# Patient Record
Sex: MALE | Race: BLACK OR AFRICAN AMERICAN | NOT HISPANIC OR LATINO | Employment: OTHER | URBAN - METROPOLITAN AREA
[De-identification: names, ages, dates, MRNs, and addresses within clinical notes are randomized per-mention and may not be internally consistent; named-entity substitution may affect disease eponyms.]

---

## 2020-01-23 ENCOUNTER — HISTORICAL (OUTPATIENT)
Dept: LAB | Facility: HOSPITAL | Age: 55
End: 2020-01-23

## 2020-01-23 LAB — GRAM STN SPEC: NORMAL

## 2020-04-26 ENCOUNTER — HISTORICAL (OUTPATIENT)
Dept: URGENT CARE | Facility: CLINIC | Age: 55
End: 2020-04-26

## 2022-04-10 ENCOUNTER — HISTORICAL (OUTPATIENT)
Dept: ADMINISTRATIVE | Facility: HOSPITAL | Age: 57
End: 2022-04-10

## 2022-04-27 VITALS
WEIGHT: 229.94 LBS | HEIGHT: 71 IN | SYSTOLIC BLOOD PRESSURE: 136 MMHG | DIASTOLIC BLOOD PRESSURE: 90 MMHG | OXYGEN SATURATION: 97 % | BODY MASS INDEX: 32.19 KG/M2

## 2022-12-30 DIAGNOSIS — M51.36 DDD (DEGENERATIVE DISC DISEASE), LUMBAR: Primary | ICD-10-CM

## 2023-01-06 ENCOUNTER — TELEPHONE (OUTPATIENT)
Dept: NEUROSURGERY | Facility: CLINIC | Age: 58
End: 2023-01-06
Payer: MEDICARE

## 2023-01-06 NOTE — TELEPHONE ENCOUNTER
Received referral. After reviewing office notes, I did realize that Dr. Strange also referred patient to [facility]. I called patient to see if he had ever had an apt with Dr. Tejeda. He stated no but he is wanting to be worked up by us. I advised that I will call Dr. Strange's office to see if they have a more accurate office note regarding his symptoms and will give them a call back

## 2023-01-11 NOTE — TELEPHONE ENCOUNTER
Spoke with patient. I inquired about any past imaging that was done. He stated that he had an MRI at [facility] in the past 6mo. I checked on [facility], and the only thing showing is a CXR. He is complaining of lower back pain that radiates down his leg with a burning/throbbing sensation that has been going on for about 8-9mo. States he cannot sit for long periods of time because of this pain. He has had L3 and L4 back surgery with Dr. Coronel along with 2 injections and pain mngment with Dr. Funez. He has done PT [facility] at [facility] [place] and now does home therapy. He states that he thinks it is a nerve issue because when he takes his nerve medication, Nabumetone, it relieves his pain but puts him to sleep. He rates his pain 7/10 and 8/10 on his bad days. He also takes Narco to take the edge off along with Nabumetone. I asked patient if he can call the doctors above and have them fax us clinicals along with any imaging that may help his case. He will also call PT in [place] and have them fax over PT notes. I did give patient our fax number and will be looking out for records. Once received, I will review and send accordingly. Patient verbalized understanding.

## 2023-01-11 NOTE — TELEPHONE ENCOUNTER
Patient called me back. States when he tried to call to requests records, they told him that we have to send over a records release form. Patient hung up and I was not able to let him know what was going on. Per les, I will fax Dr. Strange the fax cover requesting additional records

## 2023-01-12 NOTE — TELEPHONE ENCOUNTER
Tried to call Dr. Chilel's office x2 to see if they have received my fax, no answer because the office is currently closed

## 2023-01-13 NOTE — TELEPHONE ENCOUNTER
Tried to call the office again, still says there are closed. Will call patient and see if he maybe has another number he uses.   Tried to call the patient, no answer so I left message on machine. Faxed sheet again requesting office note and recent imaging.

## 2023-01-13 NOTE — TELEPHONE ENCOUNTER
Patient returned my call. States that Dr. Chilel is out until Tuesday and their office will remained closed until he gets back. Advised that I will send myself a reminder to call them Tuesday, just wanted to update him on status. He verbally understood.

## 2023-01-17 NOTE — TELEPHONE ENCOUNTER
Tried to call again, office is currently closed. Tried to call patient to see if he can come into our office and sign release form so I can get PT notes from [facility], injection notes from Dr. Solares, pain mngment notes from Dr. Funez and back sx notes from Dr. Coronel. Plan is to schedule him with Mary and she will order testing. No answer so I left message on machine

## 2023-01-17 NOTE — TELEPHONE ENCOUNTER
Tried to call their office again to ensure they received my fax, no answer because their office is closed

## 2023-01-18 NOTE — TELEPHONE ENCOUNTER
Patient returned my call. Advised of below. He states Dr. Gaytan's office has been closed for a couple of days so I asked him if he had a email or fax machine so I can get this release form to him to speed up the process. He states he will call me back with a fax number

## 2023-01-19 NOTE — TELEPHONE ENCOUNTER
Patient called me back and gave me an e-mail address to e-mail the release of information form. Emailed him and awaiting.

## 2023-01-20 DIAGNOSIS — M51.36 DDD (DEGENERATIVE DISC DISEASE), LUMBAR: Primary | ICD-10-CM

## 2023-01-20 NOTE — TELEPHONE ENCOUNTER
Received records request. Faxed to [facility] for sx notes and injection notes and [facility] for PT notes. I called patient and advised that I did receive release form and I faxed it to above physicians. I did advise him that we can schedule an apt with Mary for Thursday but if I do not have all these records, we will need to r/s. I told him I can send a remind me for Wednesday to make sure we have everything so he is good to go for apt. He also requests at that time, to remind him of his apt and Xray. Scheduled apt with Mary on 1/26/23 at 1:00, Xray at [facility] for 12:00. Patient is aware of this and verbalized understanding

## 2023-01-25 ENCOUNTER — TELEPHONE (OUTPATIENT)
Dept: NEUROSURGERY | Facility: CLINIC | Age: 58
End: 2023-01-25
Payer: MEDICARE

## 2023-01-25 NOTE — TELEPHONE ENCOUNTER
Received all records that were requested. Tried to call patient to let him know of this and to remind him of his apt, no answer so I LMOM.

## 2023-01-25 NOTE — TELEPHONE ENCOUNTER
----- Message from Galilea Gonzáles MA sent at 1/20/2023  8:42 AM CST -----  Regarding: see if we have received requested records for patients apt on 1/26  From  Dr. Funez- Pain mngment notes, [facility] for sx notes and injection notes and [facility] for PT notes. And call patient to remind him of his apt

## 2023-01-26 ENCOUNTER — HOSPITAL ENCOUNTER (OUTPATIENT)
Dept: RADIOLOGY | Facility: HOSPITAL | Age: 58
Discharge: HOME | End: 2023-01-26
Attending: NURSE PRACTITIONER
Payer: MEDICARE

## 2023-01-26 DIAGNOSIS — M51.36 DDD (DEGENERATIVE DISC DISEASE), LUMBAR: ICD-10-CM

## 2023-01-26 PROBLEM — E78.5 HYPERLIPIDEMIA: Status: ACTIVE | Noted: 2023-01-26

## 2023-01-26 PROBLEM — E66.9 OBESITY: Status: ACTIVE | Noted: 2023-01-26

## 2023-01-26 PROBLEM — Z91.09 ENVIRONMENTAL ALLERGIES: Status: ACTIVE | Noted: 2023-01-26

## 2023-01-26 PROBLEM — K21.9 GASTROESOPHAGEAL REFLUX DISEASE WITHOUT ESOPHAGITIS: Status: ACTIVE | Noted: 2023-01-26

## 2023-01-26 PROBLEM — I10 HYPERTENSION: Status: ACTIVE | Noted: 2023-01-26

## 2023-01-26 PROCEDURE — 72114 X-RAY EXAM L-S SPINE BENDING: CPT | Mod: TC

## 2023-01-30 ENCOUNTER — TELEPHONE (OUTPATIENT)
Dept: NEUROSURGERY | Facility: CLINIC | Age: 58
End: 2023-01-30
Payer: MEDICARE

## 2023-01-30 NOTE — TELEPHONE ENCOUNTER
The patient called concerned about insurance authorizing another MRI ordered by Mary because he was under the impression that he had a recent MRI ordered by Dr. Hickman. I called Dr. Hickman's office for any recent testing and was informed that he did all of his imaging @ [facility]. We were able to push his imaging and he had a CT Lumbar done in 06/2022 and his last MRI of his Lumbar was done in 2020. I explained this to the patient and he was ok with proceeding w/ the MRI. He also had some concerns about pain mgmt. Mary referred him to get injections and he currently also sees Dr. Funez who doesn't do injections only medication pain mgmt. He expressed that he loves Dr. Funez' office and doesn't want to stop seeing him if he doesn't have to. I assured him that if he only wants to do injections w/ another doctor I can specify that on his referral so that he can continue seeing Dr. Funez for medication pain mgmt. He was compliant.

## 2023-02-14 NOTE — PROGRESS NOTES
Subjective:      Patient ID: Meir Murry is a 57 y.o. male.    Chief Complaint: Back Pain (Referred by Dr Strange, pt states he has a great deal of pain has had surgery in the past, has had injections in the past which has helped, was attending P.T[facility] would like to schedule an injection, would like injection for pain relief today,  pain level 8/10)    Referred by: Mary Cottrell, NP     HPI: Patient presents as a new consult for lumbar degenerative disc disease and lumbar post-laminectomy syndrome.  Patient has prior past surgical history of right L2-3, L3-4 decompression and screws on 03/18/2020 with Dr. Hickman.  Since then his pain is located to the lower back right buttock radiating down right leg to his ankle.  Pain described as burning and throbbing with standing, walking,sitting and excessive movements exacerbates pain. Pain wakes him up at night.   Pain today is 8-9/10. Most days pain is 7/10 at his lowest point. He has tried pain patches in the past. Icy hot, valtaren gel. Pain to his low back is proununced with leaning back, twisting at the waist. No pain when leaning forward. Sciatic pain is greater than his low back pain.     Pain wakes him up at night with burning sensation.  Pain increases with prolonged sitting and standing  Additional conservative management has included: multiple courses of PT, numerous OTC and Rx medications (Nabumetone, various narcotics, muscle relaxer's, etc.),  activity restriction, ice/heat without lasting relief of symptoms.     Pain meds include Norco, Naproxen or Celebrex, Gabapentin 600 mg TID, Tizanidine 4 mg as needed for sleep. He sees a pain specialist for this.     In the past he received three lumbar epidural steroids 4 months ago per Dr. Coronel. He said he could treat the arthritis with surgery. He is not interested in this.     Pertient Labs 2023:  Hbg/Hct normal  Platelets normal  Crea normal  GFR normal          Patient has had prior interventional pain  injections     MRI Lumbar Spine 2023:  Ordered not released    XRay Lumbar Spine 2023:  Patient   FINDINGS:  BONES: There is curvature of the lumbar spine convex to the left centered at L3.  There are postsurgical changes with transpedicular screws at L2 on the left and L3-L4 bilaterally with vertical rods.  There are disc spacers at L2-L3 and L3-L4.  Hardware appears engaged and intact.  Grade 1 anterolisthesis of L4 on L5.  Multilevel facet arthropathy.     DISCS: There are disc spacers at L2-L3 and L3-L4.  Severe disc space narrowing at L4-L5.  Moderate disc space narrowing at L5-S1.  Multilevel osteophytes. .     SOFT TISSUES: Regional soft tissues unremarkable.     Impression:     Postsurgical changes of spinal fusion without appreciable hardware complication.     Degenerative changes below the level of fusion.        Interventional Pain History      ROS bilateral low back pain R>L and right sciatica down to ankle         Objective:          Physical Exam  General: Well developed; overweight; A&O x 3; No anxiety/depression; NAD  Mental Status: Oriented to person, palce and time. Displays appropriate mood & affect.  Head: Norm cephalic and atraumatic  Neck: Midline trachea  Eyes: normal conjunctiva, normal lids, normal pupils  ENT and mouth: normal external ear, nose, and no lesions noted on the lips.  Respiratory: Symmetrical, Unlabored. No dyspnea  CV: normal  S1/S2, normal rhythm and rate. No peripheral edema.   Abdomen: Non-distended    Extremities:  Gen: No cyanosis or tenderness to palpation bilateral upper and lower extremities  Skin: Warm, pink, dry, no rashes, no lesions on the lumbar spine  Strength: 5/5 motor strength bilateral upper and lower  ROM: Full ROM in bilateral knees and ankles without pain or instability.    Neuro:  Gait: Independent Ambulator with antalgic lean, normal toe and heel raise  DTR's: mute in bilateral patellar,  Sensory: Intact to light touch bilateral  upper extremities.  Bilateral numbness to bilateral lower extremities    Spine: Normal lordosis. No scoliosis  L-spine Limited and painful l ROM to flexion, extension, bilateral rotation, No pain with flesion  Straight Leg Raise: + bilaterally. R>L  SI Joint: No tenderness to palpation bilaterally.             Assessment:         Patient to obtain MRI Lumbar spine and have results faxed to us.   I will review imaging and exam findings  and complete a procedure request. Paient will have to sign.   Hold Naproxen or Celebrex for one week  prior to procedure.    Pending review of lumbar MRI to clarify level. Suspect this is L5/S1. If so, I will request TFESI L5+ S1 on right (R>L)  Patient to follow up in clinic.   Encounter Diagnoses   Name Primary?    Lumbar degenerative disc disease Yes    Lumbar post-laminectomy syndrome     DDD (degenerative disc disease), lumbar     Lumbar postlaminectomy syndrome      Past Medical History:   Diagnosis Date    Anxiety disorder, unspecified     Aortic regurgitation     DDD (degenerative disc disease), lumbar     Depression     Essential (primary) hypertension     Male erectile dysfunction, unspecified     Mixed hyperlipidemia     Obesity, unspecified     WOLFGANG (obstructive sleep apnea)     Unspecified osteoarthritis, unspecified site        Past Surgical History:   Procedure Laterality Date    BACK SURGERY      GALLBLADDER SURGERY      KNEE SURGERY      SINUS SURGERY         Family History   Problem Relation Age of Onset    Hypertension Mother        Social History     Socioeconomic History    Marital status:    Tobacco Use    Smoking status: Never     Passive exposure: Never    Smokeless tobacco: Never       Current Outpatient Medications   Medication Sig Dispense Refill    amoxicillin (AMOXIL) 875 MG tablet Take 875 mg by mouth 2 (two) times daily.      amoxicillin-clavulanate 875-125mg (AUGMENTIN) 875-125 mg per tablet Take 1 tablet by mouth 2 (two) times daily.      celecoxib (CELEBREX) 200 MG  capsule Take 200 mg by mouth.      cetirizine (ZYRTEC) 10 MG tablet Take 10 mg by mouth.      fluticasone propionate (FLONASE) 50 mcg/actuation nasal spray 2 sprays by Each Nostril route.      gabapentin (NEURONTIN) 600 MG tablet Take 600 mg by mouth 3 (three) times daily.      HYDROcodone-acetaminophen (NORCO) 5-325 mg per tablet Take 1 tablet by mouth 2 (two) times daily.      irbesartan (AVAPRO) 300 MG tablet Take 300 mg by mouth.      montelukast (SINGULAIR) 10 mg tablet Take 10 mg by mouth.      nabumetone (RELAFEN) 750 MG tablet Take 750 mg by mouth 2 (two) times daily.      naproxen sodium (ANAPROX) 220 MG tablet Take 220 mg by mouth 2 (two) times daily as needed.      omeprazole (PRILOSEC) 20 MG capsule Take 20 mg by mouth.      pravastatin (PRAVACHOL) 20 MG tablet Take 20 mg by mouth.      pregabalin (LYRICA) 200 MG Cap Take 200 mg by mouth 2 (two) times daily.      tiZANidine (ZANAFLEX) 4 MG tablet Take 4 mg by mouth 2 (two) times daily.      ZITHROMAX Z-REVA 250 mg tablet Take by mouth.       No current facility-administered medications for this visit.       Review of patient's allergies indicates:   Allergen Reactions    Opioids - morphine analogues Itching         Plan:       Meir was seen today for back pain.    Diagnoses and all orders for this visit:    Lumbar degenerative disc disease    Lumbar post-laminectomy syndrome    DDD (degenerative disc disease), lumbar  -     Ambulatory referral/consult to Pain Clinic    Lumbar postlaminectomy syndrome  -     Ambulatory referral/consult to Pain Clinic       Patient to call us back with location of where he obtained MRI L Spine.

## 2023-02-15 ENCOUNTER — OFFICE VISIT (OUTPATIENT)
Dept: PAIN MEDICINE | Facility: CLINIC | Age: 58
End: 2023-02-15
Payer: MEDICARE

## 2023-02-15 VITALS
WEIGHT: 225 LBS | BODY MASS INDEX: 31.5 KG/M2 | DIASTOLIC BLOOD PRESSURE: 81 MMHG | HEART RATE: 73 BPM | SYSTOLIC BLOOD PRESSURE: 114 MMHG | TEMPERATURE: 98 F | HEIGHT: 71 IN

## 2023-02-15 DIAGNOSIS — M96.1 LUMBAR POST-LAMINECTOMY SYNDROME: ICD-10-CM

## 2023-02-15 DIAGNOSIS — M51.36 LUMBAR DEGENERATIVE DISC DISEASE: Primary | ICD-10-CM

## 2023-02-15 DIAGNOSIS — M96.1 LUMBAR POSTLAMINECTOMY SYNDROME: ICD-10-CM

## 2023-02-15 DIAGNOSIS — M51.36 DDD (DEGENERATIVE DISC DISEASE), LUMBAR: ICD-10-CM

## 2023-02-15 PROCEDURE — 99205 PR OFFICE/OUTPT VISIT, NEW, LEVL V, 60-74 MIN: ICD-10-PCS | Mod: ,,, | Performed by: NURSE PRACTITIONER

## 2023-02-15 PROCEDURE — 99205 OFFICE O/P NEW HI 60 MIN: CPT | Mod: ,,, | Performed by: NURSE PRACTITIONER

## 2023-02-15 NOTE — PATIENT INSTRUCTIONS
Patient to obtain MRI Lumbar spine and have results faxed to us.   I will review imaging and exam findings  and complete a procedure request. Paient will have to sign this in clinic   Once procedure is scheduled. Hold Naproxen or Celebrex for one week

## 2023-03-20 ENCOUNTER — APPOINTMENT (OUTPATIENT)
Dept: RADIOLOGY | Facility: HOSPITAL | Age: 58
End: 2023-03-20
Attending: NURSE PRACTITIONER
Payer: MEDICARE

## 2023-03-20 DIAGNOSIS — M54.9 DORSALGIA, UNSPECIFIED: ICD-10-CM

## 2023-03-20 LAB
CREAT SERPL-MCNC: 0.9 MG/DL (ref 0.5–1.4)
SAMPLE: NORMAL

## 2023-03-20 PROCEDURE — 72158 MRI LUMBAR SPINE W/O & W/DYE: CPT | Mod: TC

## 2023-03-20 PROCEDURE — 25500020 PHARM REV CODE 255: Performed by: NURSE PRACTITIONER

## 2023-03-20 PROCEDURE — A9577 INJ MULTIHANCE: HCPCS | Performed by: NURSE PRACTITIONER

## 2023-03-20 RX ADMIN — GADOBENATE DIMEGLUMINE 20 ML: 529 INJECTION, SOLUTION INTRAVENOUS at 11:03

## 2023-03-23 ENCOUNTER — OFFICE VISIT (OUTPATIENT)
Dept: NEUROSURGERY | Facility: CLINIC | Age: 58
End: 2023-03-23
Payer: MEDICARE

## 2023-03-23 ENCOUNTER — TELEPHONE (OUTPATIENT)
Dept: NEUROSURGERY | Facility: CLINIC | Age: 58
End: 2023-03-23

## 2023-03-23 VITALS
BODY MASS INDEX: 30.52 KG/M2 | HEART RATE: 88 BPM | RESPIRATION RATE: 16 BRPM | HEIGHT: 71 IN | DIASTOLIC BLOOD PRESSURE: 76 MMHG | SYSTOLIC BLOOD PRESSURE: 126 MMHG | WEIGHT: 218 LBS

## 2023-03-23 DIAGNOSIS — M43.16 SPONDYLOLISTHESIS OF LUMBAR REGION: Primary | ICD-10-CM

## 2023-03-23 DIAGNOSIS — Z98.1 S/P LUMBAR FUSION: ICD-10-CM

## 2023-03-23 DIAGNOSIS — G89.29 CHRONIC BILATERAL LOW BACK PAIN WITH RIGHT-SIDED SCIATICA: ICD-10-CM

## 2023-03-23 DIAGNOSIS — M54.41 CHRONIC BILATERAL LOW BACK PAIN WITH RIGHT-SIDED SCIATICA: ICD-10-CM

## 2023-03-23 PROCEDURE — 99204 PR OFFICE/OUTPT VISIT, NEW, LEVL IV, 45-59 MIN: ICD-10-PCS | Mod: ,,, | Performed by: NEUROLOGICAL SURGERY

## 2023-03-23 PROCEDURE — 99204 OFFICE O/P NEW MOD 45 MIN: CPT | Mod: ,,, | Performed by: NEUROLOGICAL SURGERY

## 2023-03-23 NOTE — TELEPHONE ENCOUNTER
I called to inform pt about updated appt date and time. He is now scheduled on 4/13/23 @ 9:30am. No ans; left vm

## 2023-03-23 NOTE — PATIENT INSTRUCTIONS
Mr. Murry is a 57 y.o. male who has a past medical history significant for hypertension, GERD, osteoarthritis, anxiety, and depression.  He presents as a follow-up patient in the neurosurgery clinic for progressively worsening low back pain and right leg pain for the last 2 years.  Mr. Murry is s/p a L2-3, L3-4 laminectomy and TLIF ([facility]) on 03/18/2020 by Dr. Hickman.  The patient has a normal motor exam with decreased sensation in his right anterior lateral upper leg and anterior lower leg along the L5 dermatome.     I reviewed pertinent imaging studies with the patient. A MRI lumbar spine with and without gadolinium demonstrates mild levoscoliosis.  Postoperative changes with a left L2 pedicle screw and bilateral pedicle screws at L3 and L4. There are interbody grafts in place at L2-3 and L3-4.  Grade I anterior spondylolisthesis of L4 on L5 with degenerative disc disease and Modic changes.  There is severe stenosis at L4-5 with bilateral neural foraminal narrowing.  Left neuroforaminal narrowing at L5-S1.  There is no abnormal enhancement.         PLAN:    Encounter Diagnoses   Name Primary?    Spondylolisthesis of lumbar region Yes    S/P lumbar fusion     Chronic bilateral low back pain with right-sided sciatica      No orders of the defined types were placed in this encounter.     1.  I discussed extensively with Mr. Murry that his symptoms are related to adjacent L4-5 level anterior spondylolisthesis and degeneration next to his fusion segment at L2-3 and L3-4.  Because the patient has optimized conservative therapy without significant improvement, he is a candidate for surgery.  This surgery would involve a redo lumbar surgery with a L4-5 laminectomy with extension of L4-5 fusion from L2-3 and L3-4 instrumentation.  Although there is no guarantee for improvement, I anticipate that he will improve postoperatively.  I reviewed the risks, benefits, and alternatives of this surgery. Mr. Murry would like to  discuss this surgical plan of care with his wife before making a final decision.  All questions were answered to his satisfaction.      2.  The patient needs to discontinue taking Relafen, Celebrex, and all other NSAIDs 10 days preoperatively.    3.  Mr. Murry is recommended to follow up with his established pain management specialist Dr. Funez for consideration of a lumbar epidural steroid injection.  He requests the Neurosurgery office staff to make this phone call to arrange an appointment.    4.  The patient has been instructed to follow up with his primary care physician for modifying his pain medication regimen.  I will assist with his pain management regimen for 30 days after surgery.    5.  Arrangements are to be made for Mr. Murry to follow up in the neurosurgery clinic in 2 weeks for a possible preoperative visit.  He will be bringing his wife to this appointment and finalize his decision to proceed with surgery now or wait until a later time.       The risks, benefits, and alternatives to surgery were discussed with the patient.    Complications of a Posterior Thoraco-Lumbar Fusion may include:  Failure to improve symptoms and/or increased or persistent pain; Recurrence, continuation, or worsening of the condition that required the operation; Need for further surgical intervention or treatment; Neurological injury, which may include spinal cord or nerve root injury, paralysis (which involves the inability to move arms and/or legs), clumsiness, loss of sympathetic response, loss of sensation, and loss of bowel, bladder, and sexual function; Delayed or immediate spinal instability; Failure of hardware; Misplaced screw; Pedicle fracture; Failure to fuse (increased risk with nicotine use); Theoretical risk of disease transmission from allograft material; Cerebral spinal fluid leak; Meningitis; Injury to abdominal contents- great vessels, ureters, bowel, kidneys; Damage to major blood vessels, nerves, and  surrounding anatomical structures; Scarring; Blindness; and Positioning problems such as neuropathy or compartment syndrome    Complications of any surgery may include:  Adverse reaction to anesthesia; Bleeding; Transfusion of blood products, which carries a risk of infection or reaction; Infection, which requires treatment with antibiotics by mouth or intravenously, or even further surgeries; Urinary tract infection; Heart attack, stroke, pneumonia, and DVT/PE (blood clot in the legs or pelvis that can dislodge and go to the lungs); Other unforeseen complications; Coma; and Death.    Benefits of surgery include:  Possible improvement in buttocks and leg pain, numbness, and/or weakness; and possible  improvement in back pain.    Alternatives to the procedure include:  Physical therapy, chiropractic care, acupuncture, medical therapy, and pain management      POSTERIOR THORACO-LUMBAR/ LUMBAR FUSION  DISCHARGE INSTRUCTIONS      1.  Wear your TLSO Brace when sitting upright and when you are out of bed.    Your brace will likely be discontinued after 3 months.      2.   Keep your incision clean and dry.    Your sutures or staples will be removed at your first postoperative follow-up appointment in approximately 14 days.   Place clean gauze and tape over your wound daily until your sutures or staples are removed.  Wait at least 72 hours from the time of your surgery to take a shower.  After showering, pat your incision dry and replace the wound dressing.  Do not immerse your incision in water for 4-6 weeks (e.g. bath tub, hot tub, swimming pool).      3.  Activity restrictions:  No lifting greater than 15 pounds for 3 months.  No bending, stooping, or twisting.  Avoid sitting in one position for over 30 minutes at a time.  No impact exercise or contact sports for at least 3 months.  No driving for at least 2 weeks.  To resume driving short distances (<30 minutes), you must be off of your narcotic medications and be able  to comfortably brake suddenly, should the need arise.    Get up and walk.      4.  Contact your Neurosurgeon if the following occurs:  Signs and symptoms of infection, including fever above 101.5 degrees Fahrenheit and/or chills.  Redness, swelling, warmth, or drainage from the incision.  Any lasting changes in sensation, numbness, and/or tingling.  Increased weakness or increased pain.  Swelling of the foot and/or lower leg with calf pain.    Neurosurgery has office hours Monday through Friday, 8:00 AM to 4:00 PM except for holidays. There is an answering service available during non-office hours, with 24 hours neurosurgery coverage.  Report to the Emergency Department if you need immediate medical assistance.      Because you have had a fusion, you are not to take steroidal medications or non-steroidal anti-inflammatory (NSAID) medications such as Motrin/ Ibuprofen or Naprosyn/ Naproxen unless agreed upon with your neurosurgeon.      Please contact Dr. Tyler's office for any questions or concerns.  Typically, your first follow-up appointment after a posterior thoraco-lumbar/ lumbar fusion surgery is approximately 14 days from the date of your operation.  At this time, sutures or staples will be removed.  For your second postoperative appointment in 4-6 weeks, an x-ray of your lumbar spine will be arranged in Radiology before your neurosurgery appointment.        This note will be sent to the patient's referring provider No ref. provider found and primary care provider Primary Doctor No.

## 2023-03-23 NOTE — PROGRESS NOTES
[facility]   Neurosurgery      Meir Murry  MRN: [id number], CSN: [id number]      : 1965   Age: 57 y.o. male  Payor: MEDICARE / Plan: MEDICARE PART A & B / Product Type: Government /       Ref:  No referring provider defined for this encounter.    PCP: Primary Doctor No    Visit Date: 3/23/2023     Patient Active Problem List   Diagnosis    Gastroesophageal reflux disease without esophagitis    Environmental allergies    Hyperlipidemia    Hypertension    Obesity    DDD (degenerative disc disease), lumbar       SUBJECTIVE:      CC:   Chief Complaint   Patient presents with    low back with right leg pain       HPI:   Mr. Murry is a 57 y.o. male who has a past medical history significant for hypertension, GERD, osteoarthritis, anxiety, and depression.  He presents as a follow-up patient in the neurosurgery clinic for progressively worsening low back pain and right leg pain for the last 2 years.  Mr. Muryr is s/p a L2-3, L3-4 laminectomy and TLIF ([facility]) on 2020 by Dr. Hickman.     The patient's last date of visit was 2023 with NP Mary Cottrell, as a second neurosurgical opinion after seeing Dr. Hickman postoperatively.  The plan of care was for him to pursue physical therapy, establish care with a pain management specialist, and complete a MRI lumbar spine with and without gadolinium, which was finalized recently.  Mr. Murry completed a 6 week course of PT at [facility], which resulted in partial improvement.  He completed 2 lumbar epidural steroid injections under the care of Dr. Hickman approximately 6 months ago with some improvement. Mr. Murry has established Dr. Funez as his pain management specialist.      After the patient's s/p a L2-3, L3-4 laminectomy and TLIF on 2020 by Dr. Hickman, he had 1 year of improvement before developing recurrent pain across his lower back into his right buttock with radiation down his anterior leg into his shin.   This pain is described as a tightness sensation.  His right leg pain is greater than his low back pain.     Mr. Murry has been taking hydrocodone on an as-needed basis for his pain.  The patient also taken Celebrex, Relafen, Neurontin, Lyrica, and Zanaflex.  He feels weak in his right leg, but does not have any numbness.  There have been no reports of bowel or bladder incontinence.  Although the patient is fully ambulatory, he has difficulty with assisting his elderly family members as well as playing with his grandchildren, which is most concerning to him.  Furthermore, Mr. Murry is frustrated that his lumbar surgery did not have long-term improvement and hopes that additional treatment will be associated with a better guarantee of positive outcome.      Patient Active Problem List    Diagnosis Date Noted    Gastroesophageal reflux disease without esophagitis 01/26/2023    Environmental allergies 01/26/2023    Hyperlipidemia 01/26/2023    Hypertension 01/26/2023    Obesity 01/26/2023    DDD (degenerative disc disease), lumbar      Past Medical History:   Diagnosis Date    Anxiety disorder, unspecified     Aortic regurgitation     DDD (degenerative disc disease), lumbar     Depression     Essential (primary) hypertension     Male erectile dysfunction, unspecified     Mixed hyperlipidemia     Obesity, unspecified     WOLFGANG (obstructive sleep apnea)     Unspecified osteoarthritis, unspecified site      Past Surgical History:   Procedure Laterality Date    BACK SURGERY      GALLBLADDER SURGERY      KNEE SURGERY      SINUS SURGERY         Current Outpatient Medications:     celecoxib (CELEBREX) 200 MG capsule, Take 200 mg by mouth., Disp: , Rfl:     cetirizine (ZYRTEC) 10 MG tablet, Take 10 mg by mouth., Disp: , Rfl:     fluticasone propionate (FLONASE) 50 mcg/actuation nasal spray, 2 sprays by Each Nostril route., Disp: , Rfl:     gabapentin (NEURONTIN) 600 MG tablet, Take 600 mg by mouth 3 (three) times daily., Disp:  , Rfl:     HYDROcodone-acetaminophen (NORCO) 5-325 mg per tablet, Take 1 tablet by mouth 2 (two) times daily., Disp: , Rfl:     irbesartan (AVAPRO) 300 MG tablet, Take 300 mg by mouth., Disp: , Rfl:     montelukast (SINGULAIR) 10 mg tablet, Take 10 mg by mouth., Disp: , Rfl:     nabumetone (RELAFEN) 750 MG tablet, Take 750 mg by mouth 2 (two) times daily., Disp: , Rfl:     naproxen sodium (ANAPROX) 220 MG tablet, Take 220 mg by mouth 2 (two) times daily as needed., Disp: , Rfl:     omeprazole (PRILOSEC) 20 MG capsule, Take 20 mg by mouth., Disp: , Rfl:     pravastatin (PRAVACHOL) 20 MG tablet, Take 20 mg by mouth., Disp: , Rfl:     pregabalin (LYRICA) 200 MG Cap, Take 200 mg by mouth 2 (two) times daily., Disp: , Rfl:     tiZANidine (ZANAFLEX) 4 MG tablet, Take 4 mg by mouth 2 (two) times daily., Disp: , Rfl:     amoxicillin (AMOXIL) 875 MG tablet, Take 875 mg by mouth 2 (two) times daily., Disp: , Rfl:     amoxicillin-clavulanate 875-125mg (AUGMENTIN) 875-125 mg per tablet, Take 1 tablet by mouth 2 (two) times daily., Disp: , Rfl:     ZITHROMAX Z-REVA 250 mg tablet, Take by mouth., Disp: , Rfl:     Review of patient's allergies indicates:   Allergen Reactions    Morphine      Other reaction(s): Not Indicated    Opioids - morphine analogues Itching       Social History     Tobacco Use    Smoking status: Never     Passive exposure: Never    Smokeless tobacco: Never   Substance Use Topics    Alcohol use: Not on file     Occupation: 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   HENT:  Negative for congestion and sore throat.    Eyes:  Negative for blurred vision and double vision.   Respiratory:  Negative for cough and shortness of breath.    Cardiovascular:  Negative for chest pain and  "palpitations.   Gastrointestinal:  Negative for constipation, diarrhea, nausea and vomiting.   Musculoskeletal:  Positive for back pain, Negative for neck pain.   Neurological:  Positive for focal weakness, Negative for sensory change and headaches.   Endo/Heme/Allergies:  Does not bruise/bleed easily.   Psychiatric/Behavioral:  Positive for depression and anxiety.      OBJECTIVE:     EXAMINATION:  /76   Pulse 88   Resp 16   Ht 5' 11" (1.803 m)   Wt 98.9 kg (218 lb)   BMI 30.40 kg/m²   Body Habitus: Normal    Physical Exam:  Constitutional: The patient is well-developed and cooperative, sitting comfortably in a chair. Slow to stand from a sitting position due to back pain.     Mental Status:   Oriented to person, place, and time  Normal speech    Motor:  Muscle bulk: normal in all extremities  Tone: normal in all extremities    Lower extremities:  Hip flexors: right 5/5; left 5/5  Knee extensors: right 5/5; left 5/5  Knee flexors: right 5/5; left 5/5  Foot dorsiflexors: right 5/5; left 5/5  Foot plantar flexors: right 5/5; left 5/5  Extensor hallucis longus: right 5/5; left 5/5    Sensation:  Normal to light touch x 4 extremities, except for decreased sensation to light touch in right anterior lateral upper leg as well as shin    Reflexes:  Sarkars sign: right negative; left negative  Babinski: right downgoing; left downgoing  Clonus: right negative; left negative    Musculoskeletal:    Gait: slow, cautious    Straight leg test: right positive; left negative    Upper back: No pain with palpation  Lower back: Moderate pain with palpation, well healed bilateral parasagittal scars      DIAGNOSTICS REVIEW OF IMAGING, LAB & OTHER STUDIES:  I have personally reviewed and evaluated the following reports as well as radiographic studies:    Lumbar spine x-rays, 01/26/2023- levoscoliosis at L3.  There is a left L2 pedicle screw and bilateral pedicle screws at L3 and L4.  There are interbody grafts in place at " L2-3 and L3-4.  Grade I anterior spondylolisthesis of L4 on L5 with degenerative disc disease.  There is no motion on flexion-extension views.    MRI lumbar spine with and without gadolinium, 03/21/2023- mild levoscoliosis.  Postoperative changes with a left L2 pedicle screw and bilateral pedicle screws at L3 and L4. There are interbody grafts in place at L2-3 and L3-4.  Grade I anterior spondylolisthesis of L4 on L5 with degenerative disc disease and Modic changes.  There is severe stenosis at L4-5 with bilateral neural foraminal narrowing.  Left neuroforaminal narrowing at L5-S1.  There is no abnormal enhancement.       ASSESSMENT:  Mr. Murry is a 57 y.o. male who has a past medical history significant for hypertension, GERD, osteoarthritis, anxiety, and depression.  He presents as a follow-up patient in the neurosurgery clinic for progressively worsening low back pain and right leg pain for the last 2 years.  Mr. Murry is s/p a L2-3, L3-4 laminectomy and TLIF ([facility]) on 03/18/2020 by Dr. Hickman.  The patient has a normal motor exam with decreased sensation in his right anterior lateral upper leg and anterior lower leg along the L5 dermatome.     I reviewed pertinent imaging studies with the patient. A MRI lumbar spine with and without gadolinium demonstrates mild levoscoliosis.  Postoperative changes with a left L2 pedicle screw and bilateral pedicle screws at L3 and L4. There are interbody grafts in place at L2-3 and L3-4.  Grade I anterior spondylolisthesis of L4 on L5 with degenerative disc disease and Modic changes.  There is severe stenosis at L4-5 with bilateral neural foraminal narrowing.  Left neuroforaminal narrowing at L5-S1.  There is no abnormal enhancement.         PLAN:    Encounter Diagnoses   Name Primary?    Spondylolisthesis of lumbar region Yes    S/P lumbar fusion     Chronic bilateral low back pain with right-sided sciatica      No orders of the defined types were placed in this  encounter.     1.  I discussed extensively with Mr. Murry that his symptoms are related to adjacent L4-5 level anterior spondylolisthesis and degeneration next to his fusion segment at L2-3 and L3-4.  Because the patient has optimized conservative therapy without significant improvement, he is a candidate for surgery.  This surgery would involve a redo lumbar surgery with a L4-5 laminectomy with extension of L4-5 fusion from L2-3 and L3-4 instrumentation.  Although there is no guarantee for improvement, I anticipate that he will improve postoperatively.  I reviewed the risks, benefits, and alternatives of this surgery. Mr. Murry would like to discuss this surgical plan of care with his wife before making a final decision.  All questions were answered to his satisfaction.      2.  The patient needs to discontinue taking Relafen, Celebrex, and all other NSAIDs 10 days preoperatively.    3.  Mr. Murry is recommended to follow up with his established pain management specialist Dr. Funez for consideration of a lumbar epidural steroid injection.  He requests the Neurosurgery office staff to make this phone call to arrange an appointment.    4.  The patient has been instructed to follow up with his primary care physician for modifying his pain medication regimen.  I will assist with his pain management regimen for 30 days after surgery.    5.  Arrangements are to be made for Mr. Murry to follow up in the neurosurgery clinic in 2 weeks for a possible preoperative visit.  He will be bringing his wife to this appointment and finalize his decision to proceed with surgery now or wait until a later time.       The risks, benefits, and alternatives to surgery were discussed with the patient.    Complications of a Posterior Thoraco-Lumbar Fusion may include:  Failure to improve symptoms and/or increased or persistent pain; Recurrence, continuation, or worsening of the condition that required the operation; Need for further  surgical intervention or treatment; Neurological injury, which may include spinal cord or nerve root injury, paralysis (which involves the inability to move arms and/or legs), clumsiness, loss of sympathetic response, loss of sensation, and loss of bowel, bladder, and sexual function; Delayed or immediate spinal instability; Failure of hardware; Misplaced screw; Pedicle fracture; Failure to fuse (increased risk with nicotine use); Theoretical risk of disease transmission from allograft material; Cerebral spinal fluid leak; Meningitis; Injury to abdominal contents- great vessels, ureters, bowel, kidneys; Damage to major blood vessels, nerves, and surrounding anatomical structures; Scarring; Blindness; and Positioning problems such as neuropathy or compartment syndrome    Complications of any surgery may include:  Adverse reaction to anesthesia; Bleeding; Transfusion of blood products, which carries a risk of infection or reaction; Infection, which requires treatment with antibiotics by mouth or intravenously, or even further surgeries; Urinary tract infection; Heart attack, stroke, pneumonia, and DVT/PE (blood clot in the legs or pelvis that can dislodge and go to the lungs); Other unforeseen complications; Coma; and Death.    Benefits of surgery include:  Possible improvement in buttocks and leg pain, numbness, and/or weakness; and possible  improvement in back pain.    Alternatives to the procedure include:  Physical therapy, chiropractic care, acupuncture, medical therapy, and pain management      POSTERIOR THORACO-LUMBAR/ LUMBAR FUSION  DISCHARGE INSTRUCTIONS      1.  Wear your TLSO Brace when sitting upright and when you are out of bed.    Your brace will likely be discontinued after 3 months.      2.   Keep your incision clean and dry.    Your sutures or staples will be removed at your first postoperative follow-up appointment in approximately 14 days.   Place clean gauze and tape over your wound daily until  your sutures or staples are removed.  Wait at least 72 hours from the time of your surgery to take a shower.  After showering, pat your incision dry and replace the wound dressing.  Do not immerse your incision in water for 4-6 weeks (e.g. bath tub, hot tub, swimming pool).      3.  Activity restrictions:  No lifting greater than 15 pounds for 3 months.  No bending, stooping, or twisting.  Avoid sitting in one position for over 30 minutes at a time.  No impact exercise or contact sports for at least 3 months.  No driving for at least 2 weeks.  To resume driving short distances (<30 minutes), you must be off of your narcotic medications and be able to comfortably brake suddenly, should the need arise.    Get up and walk.      4.  Contact your Neurosurgeon if the following occurs:  Signs and symptoms of infection, including fever above 101.5 degrees Fahrenheit and/or chills.  Redness, swelling, warmth, or drainage from the incision.  Any lasting changes in sensation, numbness, and/or tingling.  Increased weakness or increased pain.  Swelling of the foot and/or lower leg with calf pain.    Neurosurgery has office hours Monday through Friday, 8:00 AM to 4:00 PM except for holidays. There is an answering service available during non-office hours, with 24 hours neurosurgery coverage.  Report to the Emergency Department if you need immediate medical assistance.      Because you have had a fusion, you are not to take steroidal medications or non-steroidal anti-inflammatory (NSAID) medications such as Motrin/ Ibuprofen or Naprosyn/ Naproxen unless agreed upon with your neurosurgeon.      Please contact Dr. Tyler's office for any questions or concerns.  Typically, your first follow-up appointment after a posterior thoraco-lumbar/ lumbar fusion surgery is approximately 14 days from the date of your operation.  At this time, sutures or staples will be removed.  For your second postoperative appointment in 4-6 weeks, an x-ray of  your lumbar spine will be arranged in Radiology before your neurosurgery appointment.        This note will be sent to the patient's referring provider No ref. provider found and primary care provider Primary Doctor No.           Abril Tyler MD  Neurosurgeon

## 2023-04-13 ENCOUNTER — TELEPHONE (OUTPATIENT)
Dept: NEUROSURGERY | Facility: CLINIC | Age: 58
End: 2023-04-13

## 2023-04-13 NOTE — TELEPHONE ENCOUNTER
Spoke with patient's spouse, John, in regards to his appt today (04/13) having to be cx. I explained that due to Dr. Tyler needing to go into a major sx last minute, clinic was going to have to be r/s for the entire day and we would contact them when we had another appt date and time for him ASAP. She was very grateful for the heads up and understanding.

## 2023-04-24 ENCOUNTER — OFFICE VISIT (OUTPATIENT)
Dept: NEUROSURGERY | Facility: CLINIC | Age: 58
End: 2023-04-24
Payer: MEDICARE

## 2023-04-24 VITALS
HEART RATE: 81 BPM | BODY MASS INDEX: 30.35 KG/M2 | SYSTOLIC BLOOD PRESSURE: 149 MMHG | DIASTOLIC BLOOD PRESSURE: 86 MMHG | WEIGHT: 216.81 LBS | RESPIRATION RATE: 16 BRPM | HEIGHT: 71 IN

## 2023-04-24 DIAGNOSIS — G89.29 CHRONIC BILATERAL LOW BACK PAIN WITH RIGHT-SIDED SCIATICA: ICD-10-CM

## 2023-04-24 DIAGNOSIS — M54.41 CHRONIC BILATERAL LOW BACK PAIN WITH RIGHT-SIDED SCIATICA: ICD-10-CM

## 2023-04-24 DIAGNOSIS — M43.16 SPONDYLOLISTHESIS OF LUMBAR REGION: Primary | ICD-10-CM

## 2023-04-24 DIAGNOSIS — Z98.1 S/P LUMBAR FUSION: ICD-10-CM

## 2023-04-24 PROCEDURE — 99214 OFFICE O/P EST MOD 30 MIN: CPT | Mod: ,,, | Performed by: NEUROLOGICAL SURGERY

## 2023-04-24 PROCEDURE — 99214 PR OFFICE/OUTPT VISIT, EST, LEVL IV, 30-39 MIN: ICD-10-PCS | Mod: ,,, | Performed by: NEUROLOGICAL SURGERY

## 2023-04-24 NOTE — PATIENT INSTRUCTIONS
Mr. Murry is a 57 y.o. male who has a past medical history significant for hypertension, GERD, osteoarthritis, anxiety, and depression.  He presents as a follow-up patient in the neurosurgery clinic for progressively worsening low back pain and right leg pain for the last 2 years.  Mr. Murry is s/p a L2-3, L3-4 laminectomy and TLIF ([facility]) on 03/18/2020 by Dr. Hickman.  The patient has a normal motor exam with decreased sensation in his right medial lower leg.      I reviewed pertinent imaging studies with the patient and his wife.  A MRI lumbar spine with and without gadolinium demonstrates mild levoscoliosis.  Postoperative changes with a left L2 pedicle screw and bilateral pedicle screws at L3 and L4. There are interbody grafts in place at L2-3 and L3-4.  Grade I anterior spondylolisthesis of L4 on L5 with degenerative disc disease and Modic changes. There is severe stenosis at L4-5 with bilateral neural foraminal narrowing.  Left neuroforaminal narrowing at L5-S1.  There is no abnormal enhancement.           PLAN:    Encounter Diagnoses   Name Primary?    Spondylolisthesis of lumbar region Yes    S/P lumbar fusion     Chronic bilateral low back pain with right-sided sciatica      No orders of the defined types were placed in this encounter.       1.  I discussed extensively with Mr. Murry and his wife that his symptoms are related to adjacent L4-5 level anterior spondylolisthesis and degeneration next to his fusion segment at L2-3 and L3-4.  Because the patient has optimized conservative therapy without significant improvement, he is a candidate for surgery.  This surgery would involve a redo lumbar surgery with a L4-5 laminectomy with extension of L4-5 fusion from L2-3 and L3-4 instrumentation.  Although there is no guarantee for improvement, I anticipate that he will improve postoperatively.  I reviewed the risks, benefits, and alternatives of this surgery. Mr. Murry would like to proceed with surgery in  September 2023 so that he can spend some time with his family in the next few months.  All questions were answered to his satisfaction.      2.  The patient needs to discontinue taking Relafen, Celebrex, and all other NSAIDs 10 days preoperatively.     3.  Mr. Murry has a scheduled appointment with his pain management specialist Dr. Funez on Wednesday, 04/26/2023 for a refill of his hydrocodone as well as considering a lumbar epidural steroid injection.      4.  I have discussed with the patient that nicotine significantly inhibits bony healing and fusion.  The patient needs to discontinue using nicotine and be completely nicotine free for 4 weeks before being scheduled for elective spine surgery.  In addition, the goal will be to remain nicotine free for 3 months postoperatively.    5.  Arrangements are to be made for Mr. Murry to follow up in the neurosurgery clinic in mid August 2023 for a preoperative visit, as he is interested in pursuing surgery in September 2023.       The risks, benefits, and alternatives to surgery were discussed with the patient.     Complications of a Posterior Thoraco-Lumbar Fusion may include:  Failure to improve symptoms and/or increased or persistent pain; Recurrence, continuation, or worsening of the condition that required the operation; Need for further surgical intervention or treatment; Neurological injury, which may include spinal cord or nerve root injury, paralysis (which involves the inability to move arms and/or legs), clumsiness, loss of sympathetic response, loss of sensation, and loss of bowel, bladder, and sexual function; Delayed or immediate spinal instability; Failure of hardware; Misplaced screw; Pedicle fracture; Failure to fuse (increased risk with nicotine use); Theoretical risk of disease transmission from allograft material; Cerebral spinal fluid leak; Meningitis; Injury to abdominal contents- great vessels, ureters, bowel, kidneys; Damage to major blood  vessels, nerves, and surrounding anatomical structures; Scarring; Blindness; and Positioning problems such as neuropathy or compartment syndrome     Complications of any surgery may include:  Adverse reaction to anesthesia; Bleeding; Transfusion of blood products, which carries a risk of infection or reaction; Infection, which requires treatment with antibiotics by mouth or intravenously, or even further surgeries; Urinary tract infection; Heart attack, stroke, pneumonia, and DVT/PE (blood clot in the legs or pelvis that can dislodge and go to the lungs); Other unforeseen complications; Coma; and Death.     Benefits of surgery include:  Possible improvement in buttocks and leg pain, numbness, and/or weakness; and possible  improvement in back pain.     Alternatives to the procedure include:  Physical therapy, chiropractic care, acupuncture, medical therapy, and pain management        POSTERIOR THORACO-LUMBAR/ LUMBAR FUSION  DISCHARGE INSTRUCTIONS        1.  Wear your TLSO Brace when sitting upright and when you are out of bed.    Your brace will likely be discontinued after 3 months.       2.   Keep your incision clean and dry.    Your sutures or staples will be removed at your first postoperative follow-up appointment in approximately 14 days.   Place clean gauze and tape over your wound daily until your sutures or staples are removed.  Wait at least 72 hours from the time of your surgery to take a shower.  After showering, pat your incision dry and replace the wound dressing.  Do not immerse your incision in water for 4-6 weeks (e.g. bath tub, hot tub, swimming pool).        3.  Activity restrictions:  No lifting greater than 15 pounds for 3 months.  No bending, stooping, or twisting.  Avoid sitting in one position for over 30 minutes at a time.  No impact exercise or contact sports for at least 3 months.  No driving for at least 2 weeks.  To resume driving short distances (<30 minutes), you must be off of your  narcotic medications and be able to comfortably brake suddenly, should the need arise.    Get up and walk.        4.  Contact your Neurosurgeon if the following occurs:  Signs and symptoms of infection, including fever above 101.5 degrees Fahrenheit and/or chills.  Redness, swelling, warmth, or drainage from the incision.  Any lasting changes in sensation, numbness, and/or tingling.  Increased weakness or increased pain.  Swelling of the foot and/or lower leg with calf pain.     Neurosurgery has office hours Monday through Friday, 8:00 AM to 4:00 PM except for holidays. There is an answering service available during non-office hours, with 24 hours neurosurgery coverage.  Report to the Emergency Department if you need immediate medical assistance.       Because you have had a fusion, you are not to take steroidal medications or non-steroidal anti-inflammatory (NSAID) medications such as Motrin/ Ibuprofen or Naprosyn/ Naproxen unless agreed upon with your neurosurgeon.       Please contact Dr. Tyler's office for any questions or concerns.  Typically, your first follow-up appointment after a posterior thoraco-lumbar/ lumbar fusion surgery is approximately 14 days from the date of your operation.  At this time, sutures or staples will be removed.  For your second postoperative appointment in 4-6 weeks, an x-ray of your lumbar spine will be arranged in Radiology before your neurosurgery appointment.        This note will be sent to the patient's referring provider No ref. provider found and primary care provider Primary Doctor No.

## 2023-04-24 NOTE — PROGRESS NOTES
[facility]   Neurosurgery      Meir Murry  MRN: [id number], CSN: [id number]      : 1965   Age: 57 y.o. male  Payor: MEDICARE / Plan: MEDICARE PART A & B / Product Type: Government /       Ref:  No referring provider defined for this encounter.    PCP: Primary Doctor No    Visit Date: 2023     Patient Active Problem List   Diagnosis    Gastroesophageal reflux disease without esophagitis    Environmental allergies    Hyperlipidemia    Hypertension    Obesity    DDD (degenerative disc disease), lumbar       SUBJECTIVE:      CC:   Chief Complaint   Patient presents with    progressively worsening low back and R leg pain       HPI:   Mr. Murry is a 57 y.o. male who has a past medical history significant for hypertension, GERD, osteoarthritis, anxiety, and depression.  He presents as a follow-up patient in the neurosurgery clinic for progressively worsening low back pain and right leg pain for the last 2 years.  Mr. Murry is s/p a L2-3, L3-4 laminectomy and TLIF ([facility]) on 2020 by Dr. Hickman.      The patient's last date of visit in my neurosurgery clinic was 3/23/2023.  The plan of care was to consider surgical intervention to address his adjacent L4-5 level anterior spondylolisthesis and degeneration next to his fusion segment at L2-3 and L3-4.  The proposed procedure would involve a redo lumbar surgery with a L4-5 laminectomy with extension of L4-5 fusion from L2-3 and L3-4 instrumentation.  In addition, Mr. Murry was recommended to follow up with his established pain management specialist Dr. Funez for consideration of a lumbar epidural steroid injection.  He was instructed to follow up with his primary care physician for modifying his pain medication regimen.     Mr. Murry visits the neurosurgery clinic with his wife. The patient is interested in pursuing lumbar surgery in the future, which he would like to proceed around 2023 due to plans to spend time with  his family during the summer.  He has a scheduled appointment with his pain management specialist Dr. Funez in 2 days on 04/26/2023 for a refill of his hydrocodone as well as considering a lumbar epidural steroid injection.  The patient reports running out of hydrocodone 10/325 approximately 1 week ago.  He has been taking this pain medication for the last 9 months, as prescribed by Dr. Funez. Mr. Murry continues to take Neurontin.     After the patient's s/p a L2-3, L3-4 laminectomy and TLIF on 03/18/2020 by Dr. Hickman, he had 1 year of improvement before developing recurrent pain across his lower back into his right buttock with radiation down his anterior leg into his shin.  This pain is described as a tightness sensation.  His right leg pain is greater than his low back pain.     At the present time, the patient continues to have low back pain that radiates into his right buttock and right lateral leg.  His pain level is rated an 8/10.  He has numbness in his right lower medial leg without any associated weakness.  Mr. Murry is fully ambulatory, but has significantly increased pain with prolonged ambulation.      He has been taking hydrocodone on an as-needed basis for his pain.  The patient has also taken Celebrex, Relafen, Neurontin, Lyrica, and Zanaflex. There have been no reports of bowel or bladder incontinence.  Although the patient is fully ambulatory, he has difficulty with assisting his elderly family members as well as playing with his grandchildren, which is most concerning to him.  Furthermore, Mr. Murry is frustrated that his lumbar surgery did not have long-term improvement and hopes that additional treatment will be associated with a better guarantee of positive outcome.      Patient Active Problem List    Diagnosis Date Noted    Gastroesophageal reflux disease without esophagitis 01/26/2023    Environmental allergies 01/26/2023    Hyperlipidemia 01/26/2023    Hypertension 01/26/2023     Obesity 01/26/2023    DDD (degenerative disc disease), lumbar      Past Medical History:   Diagnosis Date    Anxiety disorder, unspecified     Aortic regurgitation     DDD (degenerative disc disease), lumbar     Depression     Essential (primary) hypertension     Male erectile dysfunction, unspecified     Mixed hyperlipidemia     Obesity, unspecified     WOLFGANG (obstructive sleep apnea)     Unspecified osteoarthritis, unspecified site      Past Surgical History:   Procedure Laterality Date    BACK SURGERY      GALLBLADDER SURGERY      KNEE SURGERY      SINUS SURGERY         Current Outpatient Medications:     celecoxib (CELEBREX) 200 MG capsule, Take 200 mg by mouth as needed., Disp: , Rfl:     cetirizine (ZYRTEC) 10 MG tablet, Take 10 mg by mouth., Disp: , Rfl:     fluticasone propionate (FLONASE) 50 mcg/actuation nasal spray, 2 sprays by Each Nostril route., Disp: , Rfl:     gabapentin (NEURONTIN) 600 MG tablet, Take 600 mg by mouth 3 (three) times daily., Disp: , Rfl:     HYDROcodone-acetaminophen (NORCO) 5-325 mg per tablet, Take 1 tablet by mouth 2 (two) times daily., Disp: , Rfl:     irbesartan (AVAPRO) 300 MG tablet, Take 300 mg by mouth., Disp: , Rfl:     montelukast (SINGULAIR) 10 mg tablet, Take 10 mg by mouth., Disp: , Rfl:     nabumetone (RELAFEN) 750 MG tablet, Take 750 mg by mouth 2 (two) times daily., Disp: , Rfl:     naproxen sodium (ANAPROX) 220 MG tablet, Take 220 mg by mouth 2 (two) times daily as needed., Disp: , Rfl:     pravastatin (PRAVACHOL) 20 MG tablet, Take 20 mg by mouth., Disp: , Rfl:     pregabalin (LYRICA) 200 MG Cap, Take 200 mg by mouth 2 (two) times daily., Disp: , Rfl:     omeprazole (PRILOSEC) 20 MG capsule, Take 20 mg by mouth., Disp: , Rfl:     tiZANidine (ZANAFLEX) 4 MG tablet, Take 4 mg by mouth as needed., Disp: , Rfl:     Review of patient's allergies indicates:   Allergen Reactions    Morphine Itching     Other reaction(s): Not Indicated       Social History     Tobacco Use     "Smoking status: Some Days     Types: Cigarettes, Cigars     Passive exposure: Never    Smokeless tobacco: Never   Substance Use Topics    Alcohol use: Not on file     Occupation:  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   HENT:  Negative for congestion and sore throat.    Eyes:  Negative for blurred vision and double vision.   Respiratory:  Negative for cough and shortness of breath.    Cardiovascular:  Negative for chest pain and palpitations.   Gastrointestinal:  Negative for constipation, diarrhea, nausea and vomiting.   Musculoskeletal:  Positive for back pain, Negative for neck pain.   Neurological:  Positive for sensory change, Negative for focal weakness, headaches.   Endo/Heme/Allergies:  Does not bruise/bleed easily.   Psychiatric/Behavioral:  Positive for depression and anxiety.      OBJECTIVE:     EXAMINATION:  BP (!) 149/86   Pulse 81   Resp 16   Ht 5' 11" (1.803 m)   Wt 98.3 kg (216 lb 12.8 oz)   BMI 30.24 kg/m²   Body Habitus: Normal    Physical Exam:  Constitutional: The patient is well-developed and cooperative, sitting comfortably in a chair. Slow to stand from a sitting position due to back pain.      Mental Status:   Oriented to person, place, and time  Normal speech     Motor:  Muscle bulk: normal in all extremities  Tone: normal in all extremities     Lower extremities:  Hip flexors: right 5/5; left 5/5  Knee extensors: right 5/5; left 5/5  Knee flexors: right 5/5; left 5/5  Foot dorsiflexors: right 5/5; left 5/5  Foot plantar flexors: right 5/5; left 5/5  Extensor hallucis longus: right 5/5; left 5/5     Sensation:  Normal to light touch x 4 extremities, except for decreased sensation to light touch in right medial lower leg     Reflexes:  Sarkars sign: right " negative; left negative  Babinski: right downgoing; left downgoing  Clonus: right negative; left negative     Musculoskeletal:     Gait: slow, cautious     Straight leg test: right negative; left negative     Upper back: No pain with palpation  Lower back: Moderate pain with palpation particularly on the right, well healed bilateral parasagittal scars       DIAGNOSTICS REVIEW OF IMAGING, LAB & OTHER STUDIES:  I have personally reviewed and evaluated the following reports as well as radiographic studies:     Lumbar spine x-rays, 01/26/2023- levoscoliosis at L3.  There is a left L2 pedicle screw and bilateral pedicle screws at L3 and L4.  There are interbody grafts in place at L2-3 and L3-4.  Grade I anterior spondylolisthesis of L4 on L5 with degenerative disc disease.  There is no motion on flexion-extension views.     MRI lumbar spine with and without gadolinium, 03/21/2023- mild levoscoliosis.  Postoperative changes with a left L2 pedicle screw and bilateral pedicle screws at L3 and L4. There are interbody grafts in place at L2-3 and L3-4.  Grade I anterior spondylolisthesis of L4 on L5 with degenerative disc disease and Modic changes.  There is severe stenosis at L4-5 with bilateral neural foraminal narrowing.  Left neuroforaminal narrowing at L5-S1.  There is no abnormal enhancement.       ASSESSMENT:  Mr. Murry is a 57 y.o. male who has a past medical history significant for hypertension, GERD, osteoarthritis, anxiety, and depression.  He presents as a follow-up patient in the neurosurgery clinic for progressively worsening low back pain and right leg pain for the last 2 years.  Mr. Murry is s/p a L2-3, L3-4 laminectomy and TLIF ([facility]) on 03/18/2020 by Dr. Hickman.  The patient has a normal motor exam with decreased sensation in his right medial lower leg.      I reviewed pertinent imaging studies with the patient and his wife.  A MRI lumbar spine with and without gadolinium demonstrates mild levoscoliosis.   Postoperative changes with a left L2 pedicle screw and bilateral pedicle screws at L3 and L4. There are interbody grafts in place at L2-3 and L3-4.  Grade I anterior spondylolisthesis of L4 on L5 with degenerative disc disease and Modic changes. There is severe stenosis at L4-5 with bilateral neural foraminal narrowing.  Left neuroforaminal narrowing at L5-S1.  There is no abnormal enhancement.           PLAN:    Encounter Diagnoses   Name Primary?    Spondylolisthesis of lumbar region Yes    S/P lumbar fusion     Chronic bilateral low back pain with right-sided sciatica      No orders of the defined types were placed in this encounter.       1.  I discussed extensively with Mr. Murry and his wife that his symptoms are related to adjacent L4-5 level anterior spondylolisthesis and degeneration next to his fusion segment at L2-3 and L3-4.  Because the patient has optimized conservative therapy without significant improvement, he is a candidate for surgery.  This surgery would involve a redo lumbar surgery with a L4-5 laminectomy with extension of L4-5 fusion from L2-3 and L3-4 instrumentation.  Although there is no guarantee for improvement, I anticipate that he will improve postoperatively.  I reviewed the risks, benefits, and alternatives of this surgery. Mr. Murry would like to proceed with surgery in September 2023 so that he can spend some time with his family in the next few months.  All questions were answered to his satisfaction.      2.  The patient needs to discontinue taking Relafen, Celebrex, and all other NSAIDs 10 days preoperatively.     3.  Mr. Murry has a scheduled appointment with his pain management specialist Dr. Funez on Wednesday, 04/26/2023 for a refill of his hydrocodone as well as considering a lumbar epidural steroid injection.      4.  I have discussed with the patient that nicotine significantly inhibits bony healing and fusion.  The patient needs to discontinue using nicotine and be  completely nicotine free for 4 weeks before being scheduled for elective spine surgery.  In addition, the goal will be to remain nicotine free for 3 months postoperatively.    5.  Arrangements are to be made for Mr. Murry to follow up in the neurosurgery clinic in mid August 2023 for a preoperative visit, as he is interested in pursuing surgery in September 2023.       The risks, benefits, and alternatives to surgery were discussed with the patient.     Complications of a Posterior Thoraco-Lumbar Fusion may include:  Failure to improve symptoms and/or increased or persistent pain; Recurrence, continuation, or worsening of the condition that required the operation; Need for further surgical intervention or treatment; Neurological injury, which may include spinal cord or nerve root injury, paralysis (which involves the inability to move arms and/or legs), clumsiness, loss of sympathetic response, loss of sensation, and loss of bowel, bladder, and sexual function; Delayed or immediate spinal instability; Failure of hardware; Misplaced screw; Pedicle fracture; Failure to fuse (increased risk with nicotine use); Theoretical risk of disease transmission from allograft material; Cerebral spinal fluid leak; Meningitis; Injury to abdominal contents- great vessels, ureters, bowel, kidneys; Damage to major blood vessels, nerves, and surrounding anatomical structures; Scarring; Blindness; and Positioning problems such as neuropathy or compartment syndrome     Complications of any surgery may include:  Adverse reaction to anesthesia; Bleeding; Transfusion of blood products, which carries a risk of infection or reaction; Infection, which requires treatment with antibiotics by mouth or intravenously, or even further surgeries; Urinary tract infection; Heart attack, stroke, pneumonia, and DVT/PE (blood clot in the legs or pelvis that can dislodge and go to the lungs); Other unforeseen complications; Coma; and Death.     Benefits  of surgery include:  Possible improvement in buttocks and leg pain, numbness, and/or weakness; and possible  improvement in back pain.     Alternatives to the procedure include:  Physical therapy, chiropractic care, acupuncture, medical therapy, and pain management        POSTERIOR THORACO-LUMBAR/ LUMBAR FUSION  DISCHARGE INSTRUCTIONS        1.  Wear your TLSO Brace when sitting upright and when you are out of bed.    Your brace will likely be discontinued after 3 months.       2.   Keep your incision clean and dry.    Your sutures or staples will be removed at your first postoperative follow-up appointment in approximately 14 days.   Place clean gauze and tape over your wound daily until your sutures or staples are removed.  Wait at least 72 hours from the time of your surgery to take a shower.  After showering, pat your incision dry and replace the wound dressing.  Do not immerse your incision in water for 4-6 weeks (e.g. bath tub, hot tub, swimming pool).        3.  Activity restrictions:  No lifting greater than 15 pounds for 3 months.  No bending, stooping, or twisting.  Avoid sitting in one position for over 30 minutes at a time.  No impact exercise or contact sports for at least 3 months.  No driving for at least 2 weeks.  To resume driving short distances (<30 minutes), you must be off of your narcotic medications and be able to comfortably brake suddenly, should the need arise.    Get up and walk.        4.  Contact your Neurosurgeon if the following occurs:  Signs and symptoms of infection, including fever above 101.5 degrees Fahrenheit and/or chills.  Redness, swelling, warmth, or drainage from the incision.  Any lasting changes in sensation, numbness, and/or tingling.  Increased weakness or increased pain.  Swelling of the foot and/or lower leg with calf pain.     Neurosurgery has office hours Monday through Friday, 8:00 AM to 4:00 PM except for holidays. There is an answering service available during  non-office hours, with 24 hours neurosurgery coverage.  Report to the Emergency Department if you need immediate medical assistance.       Because you have had a fusion, you are not to take steroidal medications or non-steroidal anti-inflammatory (NSAID) medications such as Motrin/ Ibuprofen or Naprosyn/ Naproxen unless agreed upon with your neurosurgeon.       Please contact Dr. Tyler's office for any questions or concerns.  Typically, your first follow-up appointment after a posterior thoraco-lumbar/ lumbar fusion surgery is approximately 14 days from the date of your operation.  At this time, sutures or staples will be removed.  For your second postoperative appointment in 4-6 weeks, an x-ray of your lumbar spine will be arranged in Radiology before your neurosurgery appointment.        This note will be sent to the patient's referring provider No ref. provider found and primary care provider Primary Doctor No.           Abril Tyler MD  Neurosurgeon

## 2023-08-15 ENCOUNTER — OFFICE VISIT (OUTPATIENT)
Dept: NEUROSURGERY | Facility: CLINIC | Age: 58
End: 2023-08-15
Payer: MEDICARE

## 2023-08-15 VITALS
WEIGHT: 217 LBS | TEMPERATURE: 98 F | BODY MASS INDEX: 30.38 KG/M2 | SYSTOLIC BLOOD PRESSURE: 137 MMHG | DIASTOLIC BLOOD PRESSURE: 88 MMHG | RESPIRATION RATE: 16 BRPM | HEART RATE: 70 BPM | HEIGHT: 71 IN

## 2023-08-15 DIAGNOSIS — G89.29 CHRONIC BILATERAL LOW BACK PAIN WITH RIGHT-SIDED SCIATICA: ICD-10-CM

## 2023-08-15 DIAGNOSIS — M54.41 CHRONIC BILATERAL LOW BACK PAIN WITH RIGHT-SIDED SCIATICA: ICD-10-CM

## 2023-08-15 DIAGNOSIS — M48.062 LUMBAR STENOSIS WITH NEUROGENIC CLAUDICATION: ICD-10-CM

## 2023-08-15 DIAGNOSIS — Z98.1 S/P LUMBAR FUSION: ICD-10-CM

## 2023-08-15 DIAGNOSIS — M43.16 SPONDYLOLISTHESIS OF LUMBAR REGION: Primary | ICD-10-CM

## 2023-08-15 PROCEDURE — 99214 OFFICE O/P EST MOD 30 MIN: CPT | Mod: ,,, | Performed by: NEUROLOGICAL SURGERY

## 2023-08-15 PROCEDURE — 99214 PR OFFICE/OUTPT VISIT, EST, LEVL IV, 30-39 MIN: ICD-10-PCS | Mod: ,,, | Performed by: NEUROLOGICAL SURGERY

## 2023-08-15 RX ORDER — DICLOFENAC SODIUM 10 MG/G
GEL TOPICAL
Status: ON HOLD | COMMUNITY
Start: 2023-08-14 | End: 2023-09-24 | Stop reason: HOSPADM

## 2023-08-15 RX ORDER — IBUPROFEN 800 MG/1
800 TABLET ORAL 3 TIMES DAILY
Status: ON HOLD | COMMUNITY
Start: 2023-07-26 | End: 2023-09-24 | Stop reason: HOSPADM

## 2023-08-15 RX ORDER — PANTOPRAZOLE SODIUM 20 MG/1
20 TABLET, DELAYED RELEASE ORAL EVERY MORNING
Status: ON HOLD | COMMUNITY
Start: 2023-07-26 | End: 2023-09-20

## 2023-08-15 RX ORDER — LATANOPROST 50 UG/ML
1 SOLUTION/ DROPS OPHTHALMIC NIGHTLY
COMMUNITY
Start: 2023-06-07

## 2023-08-15 NOTE — PROGRESS NOTES
[facility]   Neurosurgery      Meir Murry  MRN: [id number], CSN: [id number]      : 1965   Age: 58 y.o. male  Payor: MEDICARE / Plan: MEDICARE PART A & B / Product Type: Government /       Ref:  No referring provider defined for this encounter.    PCP: No, Primary Doctor    Visit Date: 8/15/2023     Patient Active Problem List   Diagnosis    Gastroesophageal reflux disease without esophagitis    Environmental allergies    Hyperlipidemia    Hypertension    Obesity    DDD (degenerative disc disease), lumbar       SUBJECTIVE:      CC:   Chief Complaint   Patient presents with    Low back pain radiating down R leg       HPI:   Mr. Murry is a 58 y.o. male who has a past medical history significant for hypertension, GERD, osteoarthritis, anxiety, and depression.  He presents as a follow-up patient in the neurosurgery clinic for progressively worsening low back pain and right leg pain for the last 2 years.  Mr. Murry is s/p a L2-3, L3-4 laminectomy and TLIF ([facility]) on 2020 by Dr. Hickman.       The patient's last date of visit in my neurosurgery clinic was 2023.  The plan of care was to consider surgical intervention to address his adjacent L4-5 level anterior spondylolisthesis and degeneration next to his fusion segment at L2-3 and L3-4.  The proposed procedure would involve a redo lumbar surgery with a L4-5 laminectomy with extension of L4-5 fusion from L2-3 and L3-4 instrumentation.  He was interested in proceeding with this surgery in 2023.  In addition, Mr. Murry was recommended to follow up with his established pain management specialist Dr. Funez for refilling his hydrocodone and considering a lumbar epidural steroid injection.  He was instructed to follow up with his primary care physician for modifying his pain medication regimen.  Finally, we reviewed prerequisite of becoming nicotine free prior to elective lumbar fusion surgery.     Mr. Murry visits the  neurosurgery clinic with his wife.  He feels ready to proceed with elective lumbar surgery now that he has spent time with his family during the summer.  The patient continues to follow with Dr. Funez in his pain management clinic.  Mr. Murry has not had any recent lumbar epidural steroid injections.  He continues to take ibuprofen, hydrocodone, and Neurontin as needed for back pain.  The patient smokes 1 cigar a week and feels that he will be to become nicotine free in preparation for surgery.    He has not had any significant changes in regards to his symptoms when compared to his last visit.  Mr. Murry has low back pain that radiates into his right buttock with radiation down his posterolateral right leg.  This pain is described as a tightness sensation with a 7/10 pain level and is associated with generalized fatigue.  There is increased pain with walking and a reduction of pain when lying down.  The patient has numbness in his bilateral legs when walking.  There has been no focal weakness or bowel/ bladder incontinence.  Mr. Murry is fully ambulatory, but has significantly increased pain with prolonged ambulation.      The patient is frustrated that his lumbar surgery did not have long-term improvement and hopes that additional treatment will be associated with a better guarantee of positive outcome.  Participating in 2 physical therapy programs for several weeks postoperatively have not resulted in any significant improvement in his pain level.       Patient Active Problem List    Diagnosis Date Noted    Gastroesophageal reflux disease without esophagitis 01/26/2023    Environmental allergies 01/26/2023    Hyperlipidemia 01/26/2023    Hypertension 01/26/2023    Obesity 01/26/2023    DDD (degenerative disc disease), lumbar      Past Medical History:   Diagnosis Date    Anxiety disorder, unspecified     Aortic regurgitation     DDD (degenerative disc disease), lumbar     Depression     Essential (primary)  hypertension     Male erectile dysfunction, unspecified     Mixed hyperlipidemia     Obesity, unspecified     WOLFGANG (obstructive sleep apnea)     Unspecified osteoarthritis, unspecified site      Past Surgical History:   Procedure Laterality Date    BACK SURGERY      GALLBLADDER SURGERY      KNEE SURGERY      SINUS SURGERY         Current Outpatient Medications:     cetirizine (ZYRTEC) 10 MG tablet, Take 10 mg by mouth., Disp: , Rfl:     diclofenac sodium (VOLTAREN) 1 % Gel, Apply topically., Disp: , Rfl:     fluticasone propionate (FLONASE) 50 mcg/actuation nasal spray, 2 sprays by Each Nostril route., Disp: , Rfl:     gabapentin (NEURONTIN) 600 MG tablet, Take 600 mg by mouth 3 (three) times daily., Disp: , Rfl:     HYDROcodone-acetaminophen (NORCO) 5-325 mg per tablet, Take 1 tablet by mouth 2 (two) times daily., Disp: , Rfl:     ibuprofen (ADVIL,MOTRIN) 800 MG tablet, Take 800 mg by mouth 3 (three) times daily. As needed, Disp: , Rfl:     irbesartan (AVAPRO) 300 MG tablet, Take 300 mg by mouth., Disp: , Rfl:     latanoprost 0.005 % ophthalmic solution, Place 1 drop into the left eye every evening., Disp: , Rfl:     montelukast (SINGULAIR) 10 mg tablet, Take 10 mg by mouth., Disp: , Rfl:     nabumetone (RELAFEN) 750 MG tablet, Take 750 mg by mouth 2 (two) times daily., Disp: , Rfl:     pantoprazole (PROTONIX) 20 MG tablet, Take 20 mg by mouth every morning., Disp: , Rfl:     pravastatin (PRAVACHOL) 20 MG tablet, Take 20 mg by mouth., Disp: , Rfl:     tiZANidine (ZANAFLEX) 4 MG tablet, Take 4 mg by mouth as needed., Disp: , Rfl:     celecoxib (CELEBREX) 200 MG capsule, Take 200 mg by mouth as needed., Disp: , Rfl:     naproxen sodium (ANAPROX) 220 MG tablet, Take 220 mg by mouth 2 (two) times daily as needed., Disp: , Rfl:     omeprazole (PRILOSEC) 20 MG capsule, Take 20 mg by mouth., Disp: , Rfl:     pregabalin (LYRICA) 200 MG Cap, Take 200 mg by mouth 2 (two) times daily., Disp: , Rfl:     Review of patient's  "allergies indicates:   Allergen Reactions    Morphine Itching     Other reaction(s): Not Indicated       Social History     Tobacco Use    Smoking status: Some Days     Current packs/day: 0.00     Types: Cigars     Passive exposure: Never    Smokeless tobacco: Never   Substance Use Topics    Alcohol use: Not on file     Occupation: 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   HENT:  Negative for congestion and sore throat.    Eyes:  Negative for blurred vision and double vision.   Respiratory:  Negative for cough and shortness of breath.    Cardiovascular:  Negative for chest pain and palpitations.   Gastrointestinal:  Negative for constipation, diarrhea, nausea and vomiting.   Musculoskeletal:  Positive for back pain, Negative for neck pain.   Neurological:  Positive for sensory change, Negative for focal weakness, headaches.   Endo/Heme/Allergies:  Does not bruise/bleed easily.   Psychiatric/Behavioral:  Positive for depression and anxiety.      OBJECTIVE:     EXAMINATION:  /88 (BP Location: Left arm, Patient Position: Sitting)   Pulse 70   Temp 98 °F (36.7 °C)   Resp 16   Ht 5' 11" (1.803 m)   Wt 98.4 kg (217 lb)   BMI 30.27 kg/m²   Body Habitus: Normal    Physical Exam:  Constitutional: The patient is well-developed and cooperative, sitting comfortably in a chair. Slow to stand from a sitting position due to back pain.      Mental Status:   Oriented to person, place, and time  Normal speech     Motor:  Muscle bulk: normal in all extremities  Tone: normal in all extremities     Lower extremities:  Hip flexors: right 5/5; left 5/5  Knee extensors: right 5/5; left 5/5  Knee flexors: right 5/5; left 5/5  Foot dorsiflexors: right 5/5; left 5/5  Foot plantar flexors: right 5/5; " left 5/5  Extensor hallucis longus: right 5/5; left 5/5     Sensation:  Normal to light touch x 4 extremities     Reflexes:  Sarkars sign: right negative; left negative  Babinski: right downgoing; left downgoing  Clonus: right negative; left negative     Musculoskeletal:     Gait: slow, cautious     Straight leg test: right positive; left negative     Upper back: No pain with palpation  Lower back: No pain with palpation, well healed bilateral parasagittal scars       DIAGNOSTICS REVIEW OF IMAGING, LAB & OTHER STUDIES:  I have personally reviewed and evaluated the following reports as well as radiographic studies:    Lumbar spine x-rays, 01/26/2023- levoscoliosis at L3.  There is a left L2 pedicle screw and bilateral pedicle screws at L3 and L4.  There are interbody grafts in place at L2-3 and L3-4. Grade I anterior spondylolisthesis of L4 on L5 with degenerative disc disease.  There is no motion on flexion-extension views.     MRI lumbar spine with and without gadolinium, 03/21/2023- mild levoscoliosis.  Postoperative changes with a left L2 pedicle screw and bilateral pedicle screws at L3 and L4. There are interbody grafts in place at L2-3 and L3-4.  Grade I anterior spondylolisthesis of L4 on L5 with degenerative disc disease and Modic changes.  There is severe stenosis at L4-5 with bilateral neural foraminal narrowing.  Left neuroforaminal narrowing at L5-S1.  There is no abnormal enhancement.       ASSESSMENT:  Mr. Murry is a 58 y.o. male who has a past medical history significant for hypertension, GERD, osteoarthritis, anxiety, and depression.  He presents as a follow-up patient in the neurosurgery clinic for progressively worsening low back pain and right leg pain for the last 2 years.  Mr. Murry is s/p a L2-3, L3-4 laminectomy and TLIF ([facility]) on 03/18/2020 by Dr. Hickman.  The patient has a normal motor and sensory neurological exam.       I reviewed pertinent imaging studies with the patient and his  wife.  A MRI lumbar spine with and without gadolinium demonstrates mild levoscoliosis.  Postoperative changes with a left L2 pedicle screw and bilateral pedicle screws at L3 and L4. There are interbody grafts in place at L2-3 and L3-4.  Grade I anterior spondylolisthesis of L4 on L5 with degenerative disc disease and Modic changes. There is severe stenosis at L4-5 with bilateral neural foraminal narrowing.  Left neuroforaminal narrowing at L5-S1.  There is no abnormal enhancement.         PLAN:    Encounter Diagnoses   Name Primary?    Spondylolisthesis of lumbar region Yes    Lumbar stenosis with neurogenic claudication     S/P lumbar fusion     Chronic bilateral low back pain with right-sided sciatica      No orders of the defined types were placed in this encounter.       1.  I discussed extensively with Mr. Murry and his wife that his symptoms are related to adjacent L4-5 level anterior spondylolisthesis and degeneration next to his fusion segment at L2-3 and L3-4.  Because the patient has optimized conservative therapy without significant improvement, he is a candidate for surgery.  This surgery would involve a redo lumbar surgery with a L4-5 laminectomy with extension of L4-5 fusion from L2-3 and L3-4 instrumentation.  Although there is no guarantee for improvement, I anticipate that he will improve postoperatively.  I reviewed the risks, benefits, and alternatives of this surgery.  All questions were answered to his satisfaction.      2.  The patient needs to discontinue taking Relafen, Naprosyn, Celebrex, Ibuprofen, and all other NSAIDs 10 days preoperatively.    3.  His redo lumbar surgery with a L4-5 laminectomy with extension of L4-5 fusion from L2-3 and L3-4 instrumentation is being scheduled for Wednesday, 09/20/2023 at 7:30 a.m.     4.  Mr. Murry is to continue following with his pain management specialist Dr. Funez for refilling his hydrocodone.     5.  I have discussed with the patient that  nicotine significantly inhibits bony healing and fusion.  The patient needs to discontinue smoking cigars and be completely nicotine free for 4 weeks before being scheduled for elective spine surgery.  In addition, the goal will be to remain nicotine free for 3 months postoperatively.     6.  Arrangements are to be made for Mr. Murry to follow up in the neurosurgery clinic with EDELMIRA Rosado in 2 weeks for a preoperative visit.         The risks, benefits, and alternatives to surgery were discussed with the patient.     Complications of a Posterior Thoraco-Lumbar Fusion may include:  Failure to improve symptoms and/or increased or persistent pain; Recurrence, continuation, or worsening of the condition that required the operation; Need for further surgical intervention or treatment; Neurological injury, which may include spinal cord or nerve root injury, paralysis (which involves the inability to move arms and/or legs), clumsiness, loss of sympathetic response, loss of sensation, and loss of bowel, bladder, and sexual function; Delayed or immediate spinal instability; Failure of hardware; Misplaced screw; Pedicle fracture; Failure to fuse (increased risk with nicotine use); Theoretical risk of disease transmission from allograft material; Cerebral spinal fluid leak; Meningitis; Injury to abdominal contents- great vessels, ureters, bowel, kidneys; Damage to major blood vessels, nerves, and surrounding anatomical structures; Scarring; Blindness; and Positioning problems such as neuropathy or compartment syndrome     Complications of any surgery may include:  Adverse reaction to anesthesia; Bleeding; Transfusion of blood products, which carries a risk of infection or reaction; Infection, which requires treatment with antibiotics by mouth or intravenously, or even further surgeries; Urinary tract infection; Heart attack, stroke, pneumonia, and DVT/PE (blood clot in the legs or pelvis that can dislodge and go to the lungs);  Other unforeseen complications; Coma; and Death.     Benefits of surgery include:  Possible improvement in buttocks and leg pain, numbness, and/or weakness; and possible  improvement in back pain.     Alternatives to the procedure include:  Physical therapy, chiropractic care, acupuncture, medical therapy, and pain management        POSTERIOR THORACO-LUMBAR/ LUMBAR FUSION  DISCHARGE INSTRUCTIONS        1.  Wear your TLSO Brace when sitting upright and when you are out of bed.    Your brace will likely be discontinued after 3 months.       2.   Keep your incision clean and dry.    Your sutures or staples will be removed at your first postoperative follow-up appointment in approximately 14 days.   Place clean gauze and tape over your wound daily until your sutures or staples are removed.  Wait at least 72 hours from the time of your surgery to take a shower.  After showering, pat your incision dry and replace the wound dressing.  Do not immerse your incision in water for 4-6 weeks (e.g. bath tub, hot tub, swimming pool).        3.  Activity restrictions:  No lifting greater than 15 pounds for 3 months.  No bending, stooping, or twisting.  Avoid sitting in one position for over 30 minutes at a time.  No impact exercise or contact sports for at least 3 months.  No driving for at least 2 weeks.  To resume driving short distances (<30 minutes), you must be off of your narcotic medications and be able to comfortably brake suddenly, should the need arise.    Get up and walk.        4.  Contact your Neurosurgeon if the following occurs:  Signs and symptoms of infection, including fever above 101.5 degrees Fahrenheit and/or chills.  Redness, swelling, warmth, or drainage from the incision.  Any lasting changes in sensation, numbness, and/or tingling.  Increased weakness or increased pain.  Swelling of the foot and/or lower leg with calf pain.     Neurosurgery has office hours Monday through Friday, 8:00 AM to 4:00 PM except  for holidays. There is an answering service available during non-office hours, with 24 hours neurosurgery coverage.  Report to the Emergency Department if you need immediate medical assistance.       Because you have had a fusion, you are not to take steroidal medications or non-steroidal anti-inflammatory (NSAID) medications such as Motrin/ Ibuprofen or Naprosyn/ Naproxen unless agreed upon with your neurosurgeon.       Please contact Dr. Tyler's office for any questions or concerns.  Typically, your first follow-up appointment after a posterior thoraco-lumbar/ lumbar fusion surgery is approximately 14 days from the date of your operation.  At this time, sutures or staples will be removed.  For your second postoperative appointment in 4-6 weeks, an x-ray of your lumbar spine will be arranged in Radiology before your neurosurgery appointment.          This note will be sent to the patient's referring provider No ref. provider found and primary care provider No, Primary Doctor.           Abril Tyler MD  Neurosurgeon

## 2023-08-15 NOTE — PATIENT INSTRUCTIONS
Mr. Murry is a 58 y.o. male who has a past medical history significant for hypertension, GERD, osteoarthritis, anxiety, and depression.  He presents as a follow-up patient in the neurosurgery clinic for progressively worsening low back pain and right leg pain for the last 2 years.  Mr. Murry is s/p a L2-3, L3-4 laminectomy and TLIF ([facility]) on 03/18/2020 by Dr. Hickman.  The patient has a normal motor and sensory neurological exam.       I reviewed pertinent imaging studies with the patient and his wife.  A MRI lumbar spine with and without gadolinium demonstrates mild levoscoliosis.  Postoperative changes with a left L2 pedicle screw and bilateral pedicle screws at L3 and L4. There are interbody grafts in place at L2-3 and L3-4.  Grade I anterior spondylolisthesis of L4 on L5 with degenerative disc disease and Modic changes. There is severe stenosis at L4-5 with bilateral neural foraminal narrowing.  Left neuroforaminal narrowing at L5-S1.  There is no abnormal enhancement.         PLAN:    Encounter Diagnoses   Name Primary?    Spondylolisthesis of lumbar region Yes    Lumbar stenosis with neurogenic claudication     S/P lumbar fusion     Chronic bilateral low back pain with right-sided sciatica      No orders of the defined types were placed in this encounter.       1.  I discussed extensively with Mr. Murry and his wife that his symptoms are related to adjacent L4-5 level anterior spondylolisthesis and degeneration next to his fusion segment at L2-3 and L3-4.  Because the patient has optimized conservative therapy without significant improvement, he is a candidate for surgery.  This surgery would involve a redo lumbar surgery with a L4-5 laminectomy with extension of L4-5 fusion from L2-3 and L3-4 instrumentation.  Although there is no guarantee for improvement, I anticipate that he will improve postoperatively.  I reviewed the risks, benefits, and alternatives of this surgery.  All questions were answered to  his satisfaction.      2.  The patient needs to discontinue taking Relafen, Naprosyn, Celebrex, Ibuprofen, and all other NSAIDs 10 days preoperatively.    3.  His redo lumbar surgery with a L4-5 laminectomy with extension of L4-5 fusion from L2-3 and L3-4 instrumentation is being scheduled for Wednesday, 09/20/2023 at 7:30 a.m.     4.  Mr. Murry is to continue following with his pain management specialist Dr. Funez for refilling his hydrocodone.     5.  I have discussed with the patient that nicotine significantly inhibits bony healing and fusion.  The patient needs to discontinue smoking cigars and be completely nicotine free for 4 weeks before being scheduled for elective spine surgery.  In addition, the goal will be to remain nicotine free for 3 months postoperatively.     6.  Arrangements are to be made for Mr. Murry to follow up in the neurosurgery clinic with EDELMIRA Rosado in 2 weeks for a preoperative visit.         The risks, benefits, and alternatives to surgery were discussed with the patient.     Complications of a Posterior Thoraco-Lumbar Fusion may include:  Failure to improve symptoms and/or increased or persistent pain; Recurrence, continuation, or worsening of the condition that required the operation; Need for further surgical intervention or treatment; Neurological injury, which may include spinal cord or nerve root injury, paralysis (which involves the inability to move arms and/or legs), clumsiness, loss of sympathetic response, loss of sensation, and loss of bowel, bladder, and sexual function; Delayed or immediate spinal instability; Failure of hardware; Misplaced screw; Pedicle fracture; Failure to fuse (increased risk with nicotine use); Theoretical risk of disease transmission from allograft material; Cerebral spinal fluid leak; Meningitis; Injury to abdominal contents- great vessels, ureters, bowel, kidneys; Damage to major blood vessels, nerves, and surrounding anatomical structures;  Scarring; Blindness; and Positioning problems such as neuropathy or compartment syndrome     Complications of any surgery may include:  Adverse reaction to anesthesia; Bleeding; Transfusion of blood products, which carries a risk of infection or reaction; Infection, which requires treatment with antibiotics by mouth or intravenously, or even further surgeries; Urinary tract infection; Heart attack, stroke, pneumonia, and DVT/PE (blood clot in the legs or pelvis that can dislodge and go to the lungs); Other unforeseen complications; Coma; and Death.     Benefits of surgery include:  Possible improvement in buttocks and leg pain, numbness, and/or weakness; and possible  improvement in back pain.     Alternatives to the procedure include:  Physical therapy, chiropractic care, acupuncture, medical therapy, and pain management        POSTERIOR THORACO-LUMBAR/ LUMBAR FUSION  DISCHARGE INSTRUCTIONS        1.  Wear your TLSO Brace when sitting upright and when you are out of bed.    Your brace will likely be discontinued after 3 months.       2.   Keep your incision clean and dry.    Your sutures or staples will be removed at your first postoperative follow-up appointment in approximately 14 days.   Place clean gauze and tape over your wound daily until your sutures or staples are removed.  Wait at least 72 hours from the time of your surgery to take a shower.  After showering, pat your incision dry and replace the wound dressing.  Do not immerse your incision in water for 4-6 weeks (e.g. bath tub, hot tub, swimming pool).        3.  Activity restrictions:  No lifting greater than 15 pounds for 3 months.  No bending, stooping, or twisting.  Avoid sitting in one position for over 30 minutes at a time.  No impact exercise or contact sports for at least 3 months.  No driving for at least 2 weeks.  To resume driving short distances (<30 minutes), you must be off of your narcotic medications and be able to comfortably brake  suddenly, should the need arise.    Get up and walk.        4.  Contact your Neurosurgeon if the following occurs:  Signs and symptoms of infection, including fever above 101.5 degrees Fahrenheit and/or chills.  Redness, swelling, warmth, or drainage from the incision.  Any lasting changes in sensation, numbness, and/or tingling.  Increased weakness or increased pain.  Swelling of the foot and/or lower leg with calf pain.     Neurosurgery has office hours Monday through Friday, 8:00 AM to 4:00 PM except for holidays. There is an answering service available during non-office hours, with 24 hours neurosurgery coverage.  Report to the Emergency Department if you need immediate medical assistance.       Because you have had a fusion, you are not to take steroidal medications or non-steroidal anti-inflammatory (NSAID) medications such as Motrin/ Ibuprofen or Naprosyn/ Naproxen unless agreed upon with your neurosurgeon.       Please contact Dr. Tyler's office for any questions or concerns.  Typically, your first follow-up appointment after a posterior thoraco-lumbar/ lumbar fusion surgery is approximately 14 days from the date of your operation.  At this time, sutures or staples will be removed.  For your second postoperative appointment in 4-6 weeks, an x-ray of your lumbar spine will be arranged in Radiology before your neurosurgery appointment.          This note will be sent to the patient's referring provider No ref. provider found and primary care provider No, Primary Doctor.
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
## 2023-09-07 ENCOUNTER — PATIENT MESSAGE (OUTPATIENT)
Dept: RESEARCH | Facility: HOSPITAL | Age: 58
End: 2023-09-07
Payer: MEDICARE

## 2023-09-07 ENCOUNTER — OFFICE VISIT (OUTPATIENT)
Dept: NEUROSURGERY | Facility: CLINIC | Age: 58
End: 2023-09-07
Payer: MEDICARE

## 2023-09-07 VITALS
SYSTOLIC BLOOD PRESSURE: 138 MMHG | HEART RATE: 87 BPM | TEMPERATURE: 98 F | HEIGHT: 71 IN | DIASTOLIC BLOOD PRESSURE: 85 MMHG | BODY MASS INDEX: 30.83 KG/M2 | RESPIRATION RATE: 16 BRPM | WEIGHT: 220.19 LBS

## 2023-09-07 DIAGNOSIS — M51.36 DDD (DEGENERATIVE DISC DISEASE), LUMBAR: ICD-10-CM

## 2023-09-07 DIAGNOSIS — I35.1 AORTIC VALVE INSUFFICIENCY, ETIOLOGY OF CARDIAC VALVE DISEASE UNSPECIFIED: ICD-10-CM

## 2023-09-07 DIAGNOSIS — M48.062 LUMBAR STENOSIS WITH NEUROGENIC CLAUDICATION: ICD-10-CM

## 2023-09-07 DIAGNOSIS — D68.9 COAGULATION DEFECT: ICD-10-CM

## 2023-09-07 DIAGNOSIS — K21.9 GASTROESOPHAGEAL REFLUX DISEASE, UNSPECIFIED WHETHER ESOPHAGITIS PRESENT: ICD-10-CM

## 2023-09-07 DIAGNOSIS — Z01.818 PREOPERATIVE TESTING: Primary | ICD-10-CM

## 2023-09-07 DIAGNOSIS — M43.16 SPONDYLOLISTHESIS OF LUMBAR REGION: ICD-10-CM

## 2023-09-07 PROCEDURE — 99024 POSTOP FOLLOW-UP VISIT: CPT | Mod: POP,,, | Performed by: NURSE PRACTITIONER

## 2023-09-07 PROCEDURE — 99024 PR POST-OP FOLLOW-UP VISIT: ICD-10-PCS | Mod: POP,,, | Performed by: NURSE PRACTITIONER

## 2023-09-07 NOTE — PATIENT INSTRUCTIONS
Last dose of Celebrex (celecoxib), Relafen (nabumetone), Aleve (naproxen), and all other NSAIDs should be on 9/9/2023    We will notify you on 9/19/23 regarding your arrival time on 9/20/23    Please cleanse the surgical area with Hibiclens the night prior to surgery as well as the morning of surgery.     Nothing to eat or drink after midnight the night before surgery.    Not nicotine/tobacco use from now until 3 months after surgery.

## 2023-09-13 ENCOUNTER — ANESTHESIA EVENT (OUTPATIENT)
Dept: SURGERY | Facility: HOSPITAL | Age: 58
DRG: 460 | End: 2023-09-13
Payer: MEDICARE

## 2023-09-15 ENCOUNTER — HOSPITAL ENCOUNTER (OUTPATIENT)
Dept: RADIOLOGY | Facility: HOSPITAL | Age: 58
Discharge: HOME | End: 2023-09-15
Attending: NURSE PRACTITIONER
Payer: MEDICARE

## 2023-09-15 DIAGNOSIS — I35.1 AORTIC VALVE INSUFFICIENCY, ETIOLOGY OF CARDIAC VALVE DISEASE UNSPECIFIED: ICD-10-CM

## 2023-09-15 DIAGNOSIS — M48.062 LUMBAR STENOSIS WITH NEUROGENIC CLAUDICATION: ICD-10-CM

## 2023-09-15 DIAGNOSIS — Z01.818 PREOPERATIVE TESTING: ICD-10-CM

## 2023-09-15 DIAGNOSIS — M43.16 SPONDYLOLISTHESIS OF LUMBAR REGION: ICD-10-CM

## 2023-09-15 DIAGNOSIS — K21.9 GASTROESOPHAGEAL REFLUX DISEASE, UNSPECIFIED WHETHER ESOPHAGITIS PRESENT: ICD-10-CM

## 2023-09-15 DIAGNOSIS — M51.36 DDD (DEGENERATIVE DISC DISEASE), LUMBAR: ICD-10-CM

## 2023-09-15 PROCEDURE — 71046 X-RAY EXAM CHEST 2 VIEWS: CPT | Mod: TC

## 2023-09-19 ENCOUNTER — TELEPHONE (OUTPATIENT)
Dept: NEUROSURGERY | Facility: CLINIC | Age: 58
End: 2023-09-19
Payer: MEDICARE

## 2023-09-19 DIAGNOSIS — M51.36 DEGENERATION OF LUMBAR INTERVERTEBRAL DISC: ICD-10-CM

## 2023-09-19 DIAGNOSIS — M48.062 LUMBAR STENOSIS WITH NEUROGENIC CLAUDICATION: ICD-10-CM

## 2023-09-19 DIAGNOSIS — M51.36 DDD (DEGENERATIVE DISC DISEASE), LUMBAR: Primary | ICD-10-CM

## 2023-09-19 DIAGNOSIS — M43.16 SPONDYLOLISTHESIS OF LUMBAR REGION: ICD-10-CM

## 2023-09-19 NOTE — PRE-PROCEDURE INSTRUCTIONS
[facility]: Outpatient Surgery  Preprocedure Check-In Instructions     Your arrival time for your surgery or procedure is __0500____.  We ask patients to arrive about 2 hours before surgery to allow for enough time to review your health history & medications, start your IV, complete any outstanding labwork or tests, and meet your Anesthesiologist.  You will arrive at [facility], [address] [place] [address].  Enter through the [place] entrance next to the Emergency Room, and come to the 6th floor to the Outpatient Surgery Department.     Visitory Policy:  You are allowed 2 adult visitors to be with you in the hospital. All hospital visitors should be in good current health.  No small children.     What to Bring:  Please have your ID, insurance cards, and all home medication bottles with you at check in.  Bring your CPAP machine if one is used at home.     Fasting:  Nothing to eat or drink after midnight the night before your procedure. This includes no ice, gum, hard candies, and/or tobacco products.  Follow your doctor's instructions for taking any medications on the morning of your procedure.  If no instructions for taking medications were given, do not take any medications but bring your medications in their bottles to your procedure check in.     Follow your doctor's preoperative instructions regarding skin prep, bowel prep, bathing, or showering prior to your procedure.  If any special soaps were provided to you, please use according to your doctor's instructions. If no instructions were given from your doctor, take a good bath or shower with antibacterial soap the night before and the morning of your procedure.  On the morning of procedure, wear loose, comfortable clothing.  No lotions, makeup, perfumes, colognes, deodorant, or jewelry to your procedure.  Removable items (glasses, contact lenses, dentures, retainers, hearing aids) need to be removed for your  procedure.  Bring your storage containers for these items if you wear them.     Artificial nails, body jewelry, eyelash extensions, and/or hair extensions with metal clips are not allowed during your surgery.  If you currently wear any of these items, please arrange for them to be removed prior to your arrival to the hospital.     Outpatient or Same Day Surgeries:  Any patients receiving sedation/anesthesia are advised not to drive for 24 hours after their procedure.  We do not allow patients to drive themselves home after discharge.  If you are going home after your procedure, please have someone available to drive you home from the hospital.        You may call the Outpatient Surgery Department at [phone] with any questions or concerns.  We are looking forward to meeting you and taking great care of you for your procedure.  Thank you for choosing [facility] for your surgical needs.

## 2023-09-19 NOTE — TELEPHONE ENCOUNTER
I am requesting Diane to contact Mr. Murry.  I reviewed the patient's preoperative lab and test results that were completed on 09/15/2023.  All of these values are acceptable for proceeding with surgery.      Mr. Murry is scheduled for a redo lumbar surgery with a L4-5 laminectomy with extension of L4-5 fusion from L2-3 and L3-4 instrumentation tomorrow on Wednesday, 09/20/2023 at 7:30 a.m.

## 2023-09-19 NOTE — TELEPHONE ENCOUNTER
Called patient to confirm arrival time and instructions for surgery tomorrow. No answer on both numbers. I left message on both numbers. I spoke with Outpt surgery and confirmed Emilie did speak with patient earlier today with instructions.

## 2023-09-20 ENCOUNTER — ANESTHESIA (OUTPATIENT)
Dept: SURGERY | Facility: HOSPITAL | Age: 58
DRG: 460 | End: 2023-09-20
Payer: MEDICARE

## 2023-09-20 ENCOUNTER — HOSPITAL ENCOUNTER (INPATIENT)
Facility: HOSPITAL | Age: 58
LOS: 4 days | Discharge: HOME | DRG: 460 | End: 2023-09-24
Attending: NEUROLOGICAL SURGERY | Admitting: NEUROLOGICAL SURGERY
Payer: MEDICARE

## 2023-09-20 DIAGNOSIS — M51.36 DDD (DEGENERATIVE DISC DISEASE), LUMBAR: ICD-10-CM

## 2023-09-20 DIAGNOSIS — M51.36 DEGENERATION OF LUMBAR INTERVERTEBRAL DISC: ICD-10-CM

## 2023-09-20 DIAGNOSIS — M43.16 SPONDYLOLISTHESIS OF LUMBAR REGION: ICD-10-CM

## 2023-09-20 DIAGNOSIS — M48.062 LUMBAR STENOSIS WITH NEUROGENIC CLAUDICATION: ICD-10-CM

## 2023-09-20 LAB
ABORH RETYPE: NORMAL
GROUP & RH: NORMAL
INDIRECT COOMBS GEL: NORMAL
SPECIMEN OUTDATE: NORMAL

## 2023-09-20 PROCEDURE — 22614 ARTHRD PST TQ 1NTRSPC EA ADD: CPT | Mod: ,,, | Performed by: NEUROLOGICAL SURGERY

## 2023-09-20 PROCEDURE — D9220A PRA ANESTHESIA: ICD-10-PCS | Mod: CRNA,,,

## 2023-09-20 PROCEDURE — 63600175 PHARM REV CODE 636 W HCPCS: Mod: JZ,JG

## 2023-09-20 PROCEDURE — 22612 PR ARTHRODESIS, POST/POSTLAT, SNGL INTERSPACE, LUMBAR: ICD-10-PCS | Mod: ,,, | Performed by: NEUROLOGICAL SURGERY

## 2023-09-20 PROCEDURE — 25000003 PHARM REV CODE 250: Performed by: NEUROLOGICAL SURGERY

## 2023-09-20 PROCEDURE — 63005: ICD-10-PCS | Mod: 51,,, | Performed by: NEUROLOGICAL SURGERY

## 2023-09-20 PROCEDURE — 11000001 HC ACUTE MED/SURG PRIVATE ROOM

## 2023-09-20 PROCEDURE — C1713 ANCHOR/SCREW BN/BN,TIS/BN: HCPCS | Performed by: NEUROLOGICAL SURGERY

## 2023-09-20 PROCEDURE — 22849 REINSERT SPINAL FIXATION: CPT | Mod: ,,, | Performed by: NEUROLOGICAL SURGERY

## 2023-09-20 PROCEDURE — 63600175 PHARM REV CODE 636 W HCPCS: Performed by: NEUROLOGICAL SURGERY

## 2023-09-20 PROCEDURE — 71000039 HC RECOVERY, EACH ADD'L HOUR: Performed by: NEUROLOGICAL SURGERY

## 2023-09-20 PROCEDURE — D9220A PRA ANESTHESIA: Mod: ANES,,, | Performed by: ANESTHESIOLOGY

## 2023-09-20 PROCEDURE — 61783 PR STEREOTACTIC COMP ASSIST PROC,SPINAL: ICD-10-PCS | Mod: 59,,, | Performed by: NEUROLOGICAL SURGERY

## 2023-09-20 PROCEDURE — 20930 PR ALLOGRAFT FOR SPINE SURGERY ONLY MORSELIZED: ICD-10-PCS | Mod: ,,, | Performed by: NEUROLOGICAL SURGERY

## 2023-09-20 PROCEDURE — 99024 PR POST-OP FOLLOW-UP VISIT: ICD-10-PCS | Mod: POP,,, | Performed by: NEUROLOGICAL SURGERY

## 2023-09-20 PROCEDURE — 20930 SP BONE ALGRFT MORSEL ADD-ON: CPT | Mod: ,,, | Performed by: NEUROLOGICAL SURGERY

## 2023-09-20 PROCEDURE — 63600175 PHARM REV CODE 636 W HCPCS

## 2023-09-20 PROCEDURE — 27201423 OPTIME MED/SURG SUP & DEVICES STERILE SUPPLY: Performed by: NEUROLOGICAL SURGERY

## 2023-09-20 PROCEDURE — 25000003 PHARM REV CODE 250

## 2023-09-20 PROCEDURE — 36620 ARTERIAL: ICD-10-PCS | Mod: 59,,, | Performed by: ANESTHESIOLOGY

## 2023-09-20 PROCEDURE — D9220A PRA ANESTHESIA: Mod: CRNA,,,

## 2023-09-20 PROCEDURE — 63005 REMOVE SPINE LAMINA 1/2 LMBR: CPT | Mod: 51,,, | Performed by: NEUROLOGICAL SURGERY

## 2023-09-20 PROCEDURE — A4216 STERILE WATER/SALINE, 10 ML: HCPCS

## 2023-09-20 PROCEDURE — 61783 SCAN PROC SPINAL: CPT | Mod: 59,,, | Performed by: NEUROLOGICAL SURGERY

## 2023-09-20 PROCEDURE — 36620 INSERTION CATHETER ARTERY: CPT

## 2023-09-20 PROCEDURE — 22614 PR ARTHRODESIS, POST/POSTLAT, SNGL INTERSPACE, EA ADDTL: ICD-10-PCS | Mod: ,,, | Performed by: NEUROLOGICAL SURGERY

## 2023-09-20 PROCEDURE — 99024 POSTOP FOLLOW-UP VISIT: CPT | Mod: POP,,, | Performed by: NEUROLOGICAL SURGERY

## 2023-09-20 PROCEDURE — P9045 ALBUMIN (HUMAN), 5%, 250 ML: HCPCS | Mod: JZ,JG

## 2023-09-20 PROCEDURE — 20936 SP BONE AGRFT LOCAL ADD-ON: CPT | Mod: ,,, | Performed by: NEUROLOGICAL SURGERY

## 2023-09-20 PROCEDURE — D9220A PRA ANESTHESIA: ICD-10-PCS | Mod: ANES,,, | Performed by: ANESTHESIOLOGY

## 2023-09-20 PROCEDURE — 27800903 OPTIME MED/SURG SUP & DEVICES OTHER IMPLANTS: Performed by: NEUROLOGICAL SURGERY

## 2023-09-20 PROCEDURE — 36000712 HC OR TIME LEV V 1ST 15 MIN: Performed by: NEUROLOGICAL SURGERY

## 2023-09-20 PROCEDURE — 22849 PR REINSERT SPINAL FIXATION: ICD-10-PCS | Mod: ,,, | Performed by: NEUROLOGICAL SURGERY

## 2023-09-20 PROCEDURE — 36000713 HC OR TIME LEV V EA ADD 15 MIN: Performed by: NEUROLOGICAL SURGERY

## 2023-09-20 PROCEDURE — 37000009 HC ANESTHESIA EA ADD 15 MINS: Performed by: NEUROLOGICAL SURGERY

## 2023-09-20 PROCEDURE — 22612 ARTHRD PST TQ 1NTRSPC LUMBAR: CPT | Mod: ,,, | Performed by: NEUROLOGICAL SURGERY

## 2023-09-20 PROCEDURE — 71000033 HC RECOVERY, INTIAL HOUR: Performed by: NEUROLOGICAL SURGERY

## 2023-09-20 PROCEDURE — 20936 PR AUTOGRAFT SPINE SURGERY LOCAL FROM SAME INCISION: ICD-10-PCS | Mod: ,,, | Performed by: NEUROLOGICAL SURGERY

## 2023-09-20 PROCEDURE — 36620 INSERTION CATHETER ARTERY: CPT | Mod: 59,,, | Performed by: ANESTHESIOLOGY

## 2023-09-20 PROCEDURE — 86850 RBC ANTIBODY SCREEN: CPT | Performed by: NEUROLOGICAL SURGERY

## 2023-09-20 PROCEDURE — 37000008 HC ANESTHESIA 1ST 15 MINUTES: Performed by: NEUROLOGICAL SURGERY

## 2023-09-20 DEVICE — DBM T43110 10CC GRAFTON PUTTY
Type: IMPLANTABLE DEVICE | Site: SPINE LUMBAR | Status: FUNCTIONAL
Brand: GRAFTON®AND GRAFTON PLUS®DEMINERALIZED BONE MATRIX (DBM)

## 2023-09-20 DEVICE — ROD HORIZON CURV 5.5CCM 70MM: Type: IMPLANTABLE DEVICE | Site: SPINE LUMBAR | Status: FUNCTIONAL

## 2023-09-20 DEVICE — GRAFT CHIPS FD 4-10MM 15CC: Type: IMPLANTABLE DEVICE | Site: SPINE LUMBAR | Status: FUNCTIONAL

## 2023-09-20 DEVICE — IMPLANTABLE DEVICE: Type: IMPLANTABLE DEVICE | Site: SPINE LUMBAR | Status: FUNCTIONAL

## 2023-09-20 RX ORDER — LOSARTAN POTASSIUM 50 MG/1
100 TABLET ORAL DAILY
Status: DISCONTINUED | OUTPATIENT
Start: 2023-09-21 | End: 2023-09-21

## 2023-09-20 RX ORDER — EPINEPHRINE 0.1 MG/ML
INJECTION INTRAVENOUS
Status: DISCONTINUED | OUTPATIENT
Start: 2023-09-20 | End: 2023-09-20 | Stop reason: HOSPADM

## 2023-09-20 RX ORDER — ROCURONIUM BROMIDE 10 MG/ML
INJECTION, SOLUTION INTRAVENOUS
Status: DISCONTINUED | OUTPATIENT
Start: 2023-09-20 | End: 2023-09-20

## 2023-09-20 RX ORDER — LIDOCAINE HYDROCHLORIDE 10 MG/ML
1 INJECTION, SOLUTION EPIDURAL; INFILTRATION; INTRACAUDAL; PERINEURAL ONCE
Status: CANCELLED | OUTPATIENT
Start: 2023-09-20 | End: 2023-09-20

## 2023-09-20 RX ORDER — CEFAZOLIN SODIUM IN 0.9 % NACL 2 G/100 ML
PLASTIC BAG, INJECTION (ML) INTRAVENOUS
Status: DISCONTINUED | OUTPATIENT
Start: 2023-09-20 | End: 2023-09-20

## 2023-09-20 RX ORDER — PHENYLEPHRINE HYDROCHLORIDE 10 MG/ML
INJECTION INTRAVENOUS
Status: DISPENSED
Start: 2023-09-20 | End: 2023-09-21

## 2023-09-20 RX ORDER — ACETAMINOPHEN 10 MG/ML
INJECTION, SOLUTION INTRAVENOUS
Status: DISCONTINUED | OUTPATIENT
Start: 2023-09-20 | End: 2023-09-20

## 2023-09-20 RX ORDER — ALBUMIN HUMAN 50 G/1000ML
SOLUTION INTRAVENOUS
Status: COMPLETED
Start: 2023-09-20 | End: 2023-09-20

## 2023-09-20 RX ORDER — SODIUM CHLORIDE, SODIUM LACTATE, POTASSIUM CHLORIDE, CALCIUM CHLORIDE 600; 310; 30; 20 MG/100ML; MG/100ML; MG/100ML; MG/100ML
INJECTION, SOLUTION INTRAVENOUS CONTINUOUS
Status: CANCELLED | OUTPATIENT
Start: 2023-09-20

## 2023-09-20 RX ORDER — LIDOCAINE HYDROCHLORIDE AND EPINEPHRINE 5; 5 MG/ML; UG/ML
INJECTION, SOLUTION INFILTRATION; PERINEURAL
Status: DISCONTINUED | OUTPATIENT
Start: 2023-09-20 | End: 2023-09-20 | Stop reason: HOSPADM

## 2023-09-20 RX ORDER — GABAPENTIN 300 MG/1
600 CAPSULE ORAL 3 TIMES DAILY PRN
Status: DISCONTINUED | OUTPATIENT
Start: 2023-09-20 | End: 2023-09-24 | Stop reason: HOSPADM

## 2023-09-20 RX ORDER — MIDAZOLAM HYDROCHLORIDE 1 MG/ML
2 INJECTION INTRAMUSCULAR; INTRAVENOUS ONCE AS NEEDED
Status: CANCELLED | OUTPATIENT
Start: 2023-09-20 | End: 2035-02-15

## 2023-09-20 RX ORDER — SODIUM CITRATE AND CITRIC ACID MONOHYDRATE 334; 500 MG/5ML; MG/5ML
30 SOLUTION ORAL
Status: CANCELLED | OUTPATIENT
Start: 2023-09-20

## 2023-09-20 RX ORDER — MUPIROCIN 20 MG/G
OINTMENT TOPICAL 2 TIMES DAILY
Status: DISCONTINUED | OUTPATIENT
Start: 2023-09-20 | End: 2023-09-24 | Stop reason: HOSPADM

## 2023-09-20 RX ORDER — GLYCOPYRROLATE 0.2 MG/ML
INJECTION INTRAMUSCULAR; INTRAVENOUS
Status: DISCONTINUED | OUTPATIENT
Start: 2023-09-20 | End: 2023-09-20

## 2023-09-20 RX ORDER — BUPIVACAINE HYDROCHLORIDE 5 MG/ML
INJECTION, SOLUTION EPIDURAL; INTRACAUDAL
Status: DISCONTINUED | OUTPATIENT
Start: 2023-09-20 | End: 2023-09-20 | Stop reason: HOSPADM

## 2023-09-20 RX ORDER — CETIRIZINE HYDROCHLORIDE 10 MG/1
10 TABLET ORAL DAILY
Status: DISCONTINUED | OUTPATIENT
Start: 2023-09-21 | End: 2023-09-24 | Stop reason: HOSPADM

## 2023-09-20 RX ORDER — CEFAZOLIN SODIUM 2 G/50ML
2 SOLUTION INTRAVENOUS
Status: DISCONTINUED | OUTPATIENT
Start: 2023-09-20 | End: 2023-09-20

## 2023-09-20 RX ORDER — HYDROCODONE BITARTRATE AND ACETAMINOPHEN 10; 325 MG/1; MG/1
1 TABLET ORAL EVERY 6 HOURS PRN
Status: DISCONTINUED | OUTPATIENT
Start: 2023-09-20 | End: 2023-09-24 | Stop reason: HOSPADM

## 2023-09-20 RX ORDER — SUCCINYLCHOLINE CHLORIDE 20 MG/ML
INJECTION INTRAMUSCULAR; INTRAVENOUS
Status: DISCONTINUED | OUTPATIENT
Start: 2023-09-20 | End: 2023-09-20

## 2023-09-20 RX ORDER — HYDROMORPHONE HYDROCHLORIDE 2 MG/ML
0.4 INJECTION, SOLUTION INTRAMUSCULAR; INTRAVENOUS; SUBCUTANEOUS EVERY 5 MIN PRN
Status: DISCONTINUED | OUTPATIENT
Start: 2023-09-20 | End: 2023-09-20 | Stop reason: HOSPADM

## 2023-09-20 RX ORDER — DEXAMETHASONE SODIUM PHOSPHATE 4 MG/ML
INJECTION, SOLUTION INTRA-ARTICULAR; INTRALESIONAL; INTRAMUSCULAR; INTRAVENOUS; SOFT TISSUE
Status: DISCONTINUED | OUTPATIENT
Start: 2023-09-20 | End: 2023-09-20

## 2023-09-20 RX ORDER — AMOXICILLIN 250 MG
2 CAPSULE ORAL NIGHTLY PRN
Status: DISCONTINUED | OUTPATIENT
Start: 2023-09-20 | End: 2023-09-24 | Stop reason: HOSPADM

## 2023-09-20 RX ORDER — MONTELUKAST SODIUM 10 MG/1
10 TABLET ORAL DAILY
Status: DISCONTINUED | OUTPATIENT
Start: 2023-09-21 | End: 2023-09-24 | Stop reason: HOSPADM

## 2023-09-20 RX ORDER — ONDANSETRON 4 MG/1
8 TABLET, ORALLY DISINTEGRATING ORAL EVERY 6 HOURS PRN
Status: DISCONTINUED | OUTPATIENT
Start: 2023-09-20 | End: 2023-09-24 | Stop reason: HOSPADM

## 2023-09-20 RX ORDER — ALBUMIN HUMAN 50 G/1000ML
12.5 SOLUTION INTRAVENOUS ONCE
Status: COMPLETED | OUTPATIENT
Start: 2023-09-20 | End: 2023-09-20

## 2023-09-20 RX ORDER — PROPOFOL 10 MG/ML
VIAL (ML) INTRAVENOUS CONTINUOUS PRN
Status: DISCONTINUED | OUTPATIENT
Start: 2023-09-20 | End: 2023-09-20

## 2023-09-20 RX ORDER — HYDROMORPHONE HYDROCHLORIDE 2 MG/ML
1 INJECTION, SOLUTION INTRAMUSCULAR; INTRAVENOUS; SUBCUTANEOUS EVERY 4 HOURS PRN
Status: DISCONTINUED | OUTPATIENT
Start: 2023-09-20 | End: 2023-09-24 | Stop reason: HOSPADM

## 2023-09-20 RX ORDER — METHOCARBAMOL 100 MG/ML
750 INJECTION, SOLUTION INTRAMUSCULAR; INTRAVENOUS EVERY 8 HOURS PRN
Status: DISPENSED | OUTPATIENT
Start: 2023-09-20 | End: 2023-09-23

## 2023-09-20 RX ORDER — PRAVASTATIN SODIUM 10 MG/1
20 TABLET ORAL DAILY
Status: DISCONTINUED | OUTPATIENT
Start: 2023-09-21 | End: 2023-09-24 | Stop reason: HOSPADM

## 2023-09-20 RX ORDER — PANTOPRAZOLE SODIUM 40 MG/1
40 TABLET, DELAYED RELEASE ORAL DAILY
Status: DISCONTINUED | OUTPATIENT
Start: 2023-09-21 | End: 2023-09-24 | Stop reason: HOSPADM

## 2023-09-20 RX ORDER — CEFAZOLIN SODIUM 2 G/50ML
2 SOLUTION INTRAVENOUS
Status: DISCONTINUED | OUTPATIENT
Start: 2023-09-20 | End: 2023-09-24

## 2023-09-20 RX ORDER — PROPOFOL 10 MG/ML
VIAL (ML) INTRAVENOUS
Status: DISCONTINUED | OUTPATIENT
Start: 2023-09-20 | End: 2023-09-20

## 2023-09-20 RX ORDER — MAG HYDROX/ALUMINUM HYD/SIMETH 200-200-20
30 SUSPENSION, ORAL (FINAL DOSE FORM) ORAL EVERY 4 HOURS PRN
Status: DISCONTINUED | OUTPATIENT
Start: 2023-09-20 | End: 2023-09-24 | Stop reason: HOSPADM

## 2023-09-20 RX ORDER — ONDANSETRON 4 MG/1
4 TABLET, ORALLY DISINTEGRATING ORAL EVERY 6 HOURS PRN
Status: CANCELLED | OUTPATIENT
Start: 2023-09-20

## 2023-09-20 RX ORDER — ACETAMINOPHEN 10 MG/ML
1000 INJECTION, SOLUTION INTRAVENOUS ONCE
Status: DISCONTINUED | OUTPATIENT
Start: 2023-09-20 | End: 2023-09-20

## 2023-09-20 RX ORDER — KETAMINE HYDROCHLORIDE 100 MG/ML
INJECTION, SOLUTION INTRAMUSCULAR; INTRAVENOUS
Status: DISCONTINUED | OUTPATIENT
Start: 2023-09-20 | End: 2023-09-20

## 2023-09-20 RX ORDER — ONDANSETRON 2 MG/ML
INJECTION INTRAMUSCULAR; INTRAVENOUS
Status: DISCONTINUED | OUTPATIENT
Start: 2023-09-20 | End: 2023-09-20

## 2023-09-20 RX ORDER — SODIUM CHLORIDE 9 MG/ML
INJECTION, SOLUTION INTRAVENOUS CONTINUOUS
Status: DISCONTINUED | OUTPATIENT
Start: 2023-09-20 | End: 2023-09-21

## 2023-09-20 RX ORDER — PHENYLEPHRINE HYDROCHLORIDE 10 MG/ML
INJECTION INTRAVENOUS
Status: DISCONTINUED | OUTPATIENT
Start: 2023-09-20 | End: 2023-09-20

## 2023-09-20 RX ORDER — LIDOCAINE HYDROCHLORIDE 20 MG/ML
INJECTION, SOLUTION EPIDURAL; INFILTRATION; INTRACAUDAL; PERINEURAL
Status: DISCONTINUED | OUTPATIENT
Start: 2023-09-20 | End: 2023-09-20

## 2023-09-20 RX ORDER — FLUTICASONE PROPIONATE 50 MCG
2 SPRAY, SUSPENSION (ML) NASAL DAILY
Status: DISCONTINUED | OUTPATIENT
Start: 2023-09-21 | End: 2023-09-24 | Stop reason: HOSPADM

## 2023-09-20 RX ORDER — HYDROMORPHONE HYDROCHLORIDE 2 MG/ML
INJECTION, SOLUTION INTRAMUSCULAR; INTRAVENOUS; SUBCUTANEOUS
Status: DISCONTINUED | OUTPATIENT
Start: 2023-09-20 | End: 2023-09-20

## 2023-09-20 RX ORDER — LATANOPROST 50 UG/ML
1 SOLUTION/ DROPS OPHTHALMIC NIGHTLY
Status: DISCONTINUED | OUTPATIENT
Start: 2023-09-20 | End: 2023-09-24 | Stop reason: HOSPADM

## 2023-09-20 RX ORDER — FENTANYL CITRATE 50 UG/ML
INJECTION, SOLUTION INTRAMUSCULAR; INTRAVENOUS
Status: DISCONTINUED | OUTPATIENT
Start: 2023-09-20 | End: 2023-09-20

## 2023-09-20 RX ORDER — PREGABALIN 100 MG/1
200 CAPSULE ORAL 2 TIMES DAILY PRN
Status: DISCONTINUED | OUTPATIENT
Start: 2023-09-20 | End: 2023-09-24 | Stop reason: HOSPADM

## 2023-09-20 RX ORDER — MEPERIDINE HYDROCHLORIDE 25 MG/ML
12.5 INJECTION INTRAMUSCULAR; INTRAVENOUS; SUBCUTANEOUS ONCE AS NEEDED
Status: DISCONTINUED | OUTPATIENT
Start: 2023-09-20 | End: 2023-09-20 | Stop reason: HOSPADM

## 2023-09-20 RX ORDER — MIDAZOLAM HYDROCHLORIDE 1 MG/ML
INJECTION INTRAMUSCULAR; INTRAVENOUS
Status: DISCONTINUED | OUTPATIENT
Start: 2023-09-20 | End: 2023-09-20

## 2023-09-20 RX ADMIN — DEXMEDETOMIDINE 0.5 MCG/KG/HR: 200 INJECTION, SOLUTION INTRAVENOUS at 12:09

## 2023-09-20 RX ADMIN — LIDOCAINE HYDROCHLORIDE 80 MG: 20 INJECTION, SOLUTION EPIDURAL; INFILTRATION; INTRACAUDAL; PERINEURAL at 07:09

## 2023-09-20 RX ADMIN — HYDROMORPHONE HYDROCHLORIDE 1 MG: 2 INJECTION, SOLUTION INTRAMUSCULAR; INTRAVENOUS; SUBCUTANEOUS at 12:09

## 2023-09-20 RX ADMIN — ACETAMINOPHEN 1000 MG: 10 INJECTION, SOLUTION INTRAVENOUS at 10:09

## 2023-09-20 RX ADMIN — ONDANSETRON 4 MG: 2 INJECTION INTRAMUSCULAR; INTRAVENOUS at 02:09

## 2023-09-20 RX ADMIN — Medication 2 G: at 07:09

## 2023-09-20 RX ADMIN — Medication 2 G: at 12:09

## 2023-09-20 RX ADMIN — MIDAZOLAM HYDROCHLORIDE 2 MG: 1 INJECTION, SOLUTION INTRAMUSCULAR; INTRAVENOUS at 07:09

## 2023-09-20 RX ADMIN — PHENYLEPHRINE HYDROCHLORIDE 20 MCG/MIN: 10 INJECTION INTRAVENOUS at 07:09

## 2023-09-20 RX ADMIN — SODIUM CHLORIDE, SODIUM GLUCONATE, SODIUM ACETATE, POTASSIUM CHLORIDE AND MAGNESIUM CHLORIDE: 526; 502; 368; 37; 30 INJECTION, SOLUTION INTRAVENOUS at 01:09

## 2023-09-20 RX ADMIN — PHENYLEPHRINE HYDROCHLORIDE 200 MCG: 10 INJECTION INTRAVENOUS at 03:09

## 2023-09-20 RX ADMIN — HYDROMORPHONE HYDROCHLORIDE 0.4 MG: 2 INJECTION, SOLUTION INTRAMUSCULAR; INTRAVENOUS; SUBCUTANEOUS at 03:09

## 2023-09-20 RX ADMIN — KETAMINE HYDROCHLORIDE 25 MG: 100 INJECTION, SOLUTION, CONCENTRATE INTRAMUSCULAR; INTRAVENOUS at 08:09

## 2023-09-20 RX ADMIN — ALBUMIN (HUMAN) 12.5 G: 2.5 SOLUTION INTRAVENOUS at 04:09

## 2023-09-20 RX ADMIN — PROPOFOL 200 MG: 10 INJECTION, EMULSION INTRAVENOUS at 07:09

## 2023-09-20 RX ADMIN — FENTANYL CITRATE 100 MCG: 50 INJECTION, SOLUTION INTRAMUSCULAR; INTRAVENOUS at 07:09

## 2023-09-20 RX ADMIN — METHOCARBAMOL 750 MG: 100 INJECTION, SOLUTION INTRAMUSCULAR; INTRAVENOUS at 10:09

## 2023-09-20 RX ADMIN — KETAMINE HYDROCHLORIDE 25 MG: 100 INJECTION, SOLUTION, CONCENTRATE INTRAMUSCULAR; INTRAVENOUS at 07:09

## 2023-09-20 RX ADMIN — GLYCOPYRROLATE 0.2 MG: 0.2 INJECTION INTRAMUSCULAR; INTRAVENOUS at 07:09

## 2023-09-20 RX ADMIN — ROCURONIUM BROMIDE 10 MG: 10 SOLUTION INTRAVENOUS at 07:09

## 2023-09-20 RX ADMIN — DEXAMETHASONE SODIUM PHOSPHATE 4 MG: 4 INJECTION, SOLUTION INTRA-ARTICULAR; INTRALESIONAL; INTRAMUSCULAR; INTRAVENOUS; SOFT TISSUE at 07:09

## 2023-09-20 RX ADMIN — SUCCINYLCHOLINE CHLORIDE 200 MG: 20 INJECTION, SOLUTION INTRAMUSCULAR; INTRAVENOUS at 07:09

## 2023-09-20 RX ADMIN — MUPIROCIN: 20 OINTMENT TOPICAL at 10:09

## 2023-09-20 RX ADMIN — PROPOFOL 50 MG: 10 INJECTION, EMULSION INTRAVENOUS at 08:09

## 2023-09-20 RX ADMIN — SODIUM CHLORIDE: 9 INJECTION, SOLUTION INTRAVENOUS at 10:09

## 2023-09-20 RX ADMIN — PROPOFOL 80 MG: 10 INJECTION, EMULSION INTRAVENOUS at 07:09

## 2023-09-20 RX ADMIN — ALBUMIN HUMAN 12.5 G: 50 SOLUTION INTRAVENOUS at 04:09

## 2023-09-20 RX ADMIN — GABAPENTIN 600 MG: 300 CAPSULE ORAL at 10:09

## 2023-09-20 RX ADMIN — REMIFENTANIL HYDROCHLORIDE 0.05 MCG/KG/MIN: 1 INJECTION, POWDER, LYOPHILIZED, FOR SOLUTION INTRAVENOUS at 07:09

## 2023-09-20 RX ADMIN — PROPOFOL 150 MCG/KG/MIN: 10 INJECTION, EMULSION INTRAVENOUS at 07:09

## 2023-09-20 RX ADMIN — SODIUM CHLORIDE, SODIUM GLUCONATE, SODIUM ACETATE, POTASSIUM CHLORIDE AND MAGNESIUM CHLORIDE: 526; 502; 368; 37; 30 INJECTION, SOLUTION INTRAVENOUS at 07:09

## 2023-09-20 RX ADMIN — PHENYLEPHRINE HYDROCHLORIDE 200 MCG: 10 INJECTION INTRAVENOUS at 08:09

## 2023-09-20 RX ADMIN — HYDROMORPHONE HYDROCHLORIDE 0.2 MG: 2 INJECTION, SOLUTION INTRAMUSCULAR; INTRAVENOUS; SUBCUTANEOUS at 03:09

## 2023-09-20 NOTE — PROGRESS NOTES
Hospital Progress Note  [facility]      Admit Date: 9/20/2023  Post-operative Day: Day of Surgery  Hospital Day: 1    SUBJECTIVE:     POD #0 s/p L4-5 laminectomy and posterior fusion, with extension of fusion from L3 to L5 on the right and L3 to L5 on the left    Interval History:  I evaluated Mr. Murry in the PACU.  Although the patient is sleepy from recent general anesthesia, he is able to answer basic questions and follow commands all extremities.        Scheduled Meds:   ceFAZolin (ANCEF) IVPB  2 g Intravenous Q8H    [START ON 9/21/2023] cetirizine  10 mg Oral Daily    [START ON 9/21/2023] fluticasone propionate  2 spray Each Nostril Daily    latanoprost  1 drop Left Eye QHS    [START ON 9/21/2023] losartan  100 mg Oral Daily    [START ON 9/21/2023] montelukast  10 mg Oral Daily    mupirocin   Nasal BID    [START ON 9/21/2023] pantoprazole  40 mg Oral Daily    PHENYLephrine        [START ON 9/21/2023] pravastatin  20 mg Oral Daily     Continuous Infusions:   sodium chloride 0.9%       PRN Meds:aluminum-magnesium hydroxide-simethicone, gabapentin, HYDROcodone-acetaminophen, HYDROmorphone, methocarbamoL, ondansetron, PHENYLephrine, pregabalin, senna-docusate 8.6-50 mg    Review of patient's allergies indicates:   Allergen Reactions    Morphine Itching     Other reaction(s): Not Indicated       Past Medical History:   Diagnosis Date    Anxiety disorder, unspecified     Aortic regurgitation     Back pain     DDD (degenerative disc disease), lumbar     Depression     Essential (primary) hypertension     GERD (gastroesophageal reflux disease)     Lumbar stenosis with neurogenic claudication     Male erectile dysfunction, unspecified     Mixed hyperlipidemia     Neuropathy     Obesity, unspecified     WOLFGANG (obstructive sleep apnea)     uses CPAP    Right leg numbness     Right leg weakness     Unspecified osteoarthritis, unspecified site      Past Surgical History:   Procedure Laterality  "Date    BACK SURGERY      GALLBLADDER SURGERY  2010    SINUS SURGERY      TOTAL KNEE ARTHROPLASTY Bilateral        OBJECTIVE:     Vital Signs (Most Recent)  Temp: 97.9 °F (36.6 °C) (09/20/23 1530)  Pulse: 69 (09/20/23 1750)  Resp: 19 (09/20/23 1750)  BP: (!) 103/58 (09/20/23 1750)  SpO2: (!) 94 % (09/20/23 1750)    Vital Signs Range (Last 24H):  Temp:  [97.8 °F (36.6 °C)-97.9 °F (36.6 °C)]   Pulse:  [55-78]   Resp:  [14-21]   BP: ()/(46-85)   SpO2:  [94 %-97 %]     Drain: serosanguinous      Physical Exam:  General Sleeping, arousable, no acute distress     Motor Strength 5/5 x 4 extremities         Sensory Nl to light touch x 4 extremities, except for diffuse numbness in bilateral feet   Wound Dressing- clean, dry, and intact       Laboratory Review:    CBC without Differential:  Lab Results   Component Value Date    WBC 7.76 09/15/2023    HGB 15.1 09/15/2023    HCT 46.1 09/15/2023     09/15/2023    MCV 98.3 (H) 09/15/2023     Differential:   No results found for: "NEUTROABS", "LYMPHSABS", "BASOSABS", "MONOSABS"    Basic Metabolic Panel:  Lab Results   Component Value Date     09/15/2023    K 4.1 09/15/2023    BUN 18.9 09/15/2023     Coagulation Studies:  Lab Results   Component Value Date    INR 0.9 09/15/2023    INR 1.0 12/03/2021    INR 1.0 08/14/2021    PROTIME 12.0 (L) 09/15/2023    PROTIME 13.2 12/03/2021    PROTIME 12.7 08/14/2021     Lab Results   Component Value Date    PTT 28.2 09/15/2023     Urinalysis:  Lab Results   Component Value Date    SPECGRAV 1.021 11/15/2007    LEUKOCYTESUR Trace (A) 09/15/2023    UROBILINOGEN 1.0 09/15/2023        ASSESSMENT/PLAN:     Mr. Murry is a 58 y.o. male who has a past medical history significant for hypertension, GERD, osteoarthritis, anxiety, and depression.  He presented as a follow-up patient in the neurosurgery clinic for progressively worsening low back pain and right leg pain for the last 2 years.  Mr. Murry is s/p a L2-3, L3-4 laminectomy " and TLIF ([facility]) on 03/18/2020 by Dr. Hickman.  The patient is POD #0 s/p a L4-5 laminectomy and posterior fusion, with extension of fusion from L3 to L5 on the right and L3 to L5 on the left.  He has a normal motor and sensory neurological exam with diffuse numbness in his bilateral feet.       Plan:     1.  The patient is being admitted to a floor bed on the neurosurgery service with Q4 hr neuro checks.    2.  Continue Hemovac drain.  Continue Ancef as long as this drain remains in place.      3.  Mr. Murry is to wear an [place] LSO brace whenever his head of bed is greater than 30°.  This brace has been ordered from the [facility].     4.  The patient is encouraged to mobilize with physical therapy and occupational therapy.  PT and OT consults have been submitted.     5.  The hospitalist service has been consulted to co manage his multiple medical conditions.      6.  I personally provided a postoperative update regarding his condition and plan of care with the patient's wife.    7.  Arrangements have been made for Mr. Murry to follow up in the neurosurgery clinic with EDELMIRA Rosado for postoperative visit on Thursday, 10/05/2023 at 9:30 AM.          Abril Tyler MD  Neurosurgery

## 2023-09-20 NOTE — TRANSFER OF CARE
"Anesthesia Transfer of Care Note    Patient: Meir Murry    Procedure(s) Performed: Procedure(s) (LRB):  FUSION, SPINE, LUMBAR, PLIF, USING COMPUTER-ASSISTED NAVIGATION (N/A)    Patient location: PACU    Transport from OR: Transported from OR on room air with adequate spontaneous ventilation    Post pain: adequate analgesia    Post assessment: no apparent anesthetic complications and tolerated procedure well    Post vital signs: stable    Level of consciousness: awake and alert    Nausea/Vomiting: no nausea/vomiting    Complications: none    Transfer of care protocol was followed      Last vitals:   Visit Vitals  /85 (BP Location: Right arm, Patient Position: Sitting)   Pulse 71   Temp 36.6 °C (97.8 °F) (Oral)   Resp 18   Ht 5' 11" (1.803 m)   Wt 99.2 kg (218 lb 11.1 oz)   SpO2 95%   BMI 30.50 kg/m²     "

## 2023-09-20 NOTE — ANESTHESIA PROCEDURE NOTES
HEMATOLOGY / [address] FOLLOW UP NOTE    Primary Care Provider: Collette Heir  Referring Provider:    MRN: [id number]  : 1944    Reason for Encounter: follow up metastatic pancreatic cancer       Oncology History Overview Note   3/2022 - locally advanced, unresectable adenocarcinoma of the pancreas     2022 - start FOLFIRINOX with 20% dose reduction in oxaliplatin and irinotecan due to age     2022 - 5FU dose reduced by 20% due to diarrhea     2022 - cycle 7 delayed due to diarrhea - bolus 5-FU further reduced by a total of 30% and infusional 5-FU dose reduced by 20%     2022 - xeloda/RT for progression in the pancreas     10/2022 - progression of disease with peritoneal carcinomatosis    2022 - start gem/abraxane - 20% dose reduction of both drugs due to age and PS     Pancreatic adenocarcinoma ([facility] [address] )   3/4/2022 Biopsy    A-B-C  Pancreas, pancreatic head mass   Malignant (PSC Category VI) -  Adenocarcinoma       3/4/2022 -  Cancer Staged    Staging form: Pancreas, AJCC 8th Edition  - Clinical stage from 3/4/2022: Stage IB (cT2, cN0, cM0) - Signed by Marion Diez MD on 2022  Stage prefix: Initial diagnosis  Total positive nodes: 0       3/14/2022 Initial Diagnosis    Pancreatic adenocarcinoma (HCC)     2022 - 2022 Chemotherapy    fluorouracil (ADRUCIL), 400 mg/m2 = 690 mg, Intravenous, Once, 7 of 8 cycles  Dose modification: 320 mg/m2 (original dose 400 mg/m2, Cycle 4), 280 mg/m2 (original dose 400 mg/m2, Cycle 7, Reason: Dose Not Tolerated, Comment: 30% dose reduction due to diarrhea)  Administration: 690 mg (2022), 690 mg (5/3/2022), 690 mg (2022), 555 mg (2022), 555 mg (2022), 555 mg (2022), 485 mg (2022)  pegfilgrastim (NEULASTA), 6 mg, Subcutaneous, Once, 5 of 6 cycles  Administration: 6 mg (2022), 6 mg (2022), 6 mg (2022), 6 mg (2022)  irinotecan (CAMPTOSAR) chemo infusion, 125 mg/m2 = 216 mg (69 4 % of Intubation    Date/Time: 9/20/2023 7:35 AM    Performed by: Yaw Shipley CRNA  Authorized by: Zachary Bautista MD    Intubation:     Induction:  Intravenous    Intubated:  Postinduction    Mask Ventilation:  Easy mask    Attempts:  1    Attempted By:  Student    Method of Intubation:  Direct    Blade:  Errol 3    Laryngeal View Grade: Grade I - full view of cords      Difficult Airway Encountered?: No      Complications:  None    Airway Device:  Oral endotracheal tube    Airway Device Size:  7.5    Style/Cuff Inflation:  Cuffed    Inflation Amount (mL):  10    Tube secured:  22    Secured at:  The teeth    Placement Verified By:  Capnometry    Complicating Factors:  None    Findings Post-Intubation:  BS equal bilateral       original dose 180 mg/m2), Intravenous, Once, 7 of 8 cycles  Dose modification: 125 mg/m2 (original dose 180 mg/m2, Cycle 1, Reason: Anticipated Tolerance, Comment: Anticipated tolerance and Bilirubin being between 1-2), 90 mg/m2 (original dose 180 mg/m2, Cycle 4, Reason: Dose Not Tolerated, Comment: 50% dose reduction from original dose due to diarrhea)  Administration: 216 mg (4/19/2022), 216 mg (5/3/2022), 220 mg (5/17/2022), 156 mg (5/31/2022), 160 mg (6/14/2022), 160 mg (6/28/2022), 160 mg (7/20/2022)  leucovorin calcium IVPB, 692 mg, Intravenous, Once, 7 of 8 cycles  Administration: 700 mg (4/19/2022), 700 mg (5/3/2022), 700 mg (5/17/2022), 700 mg (5/31/2022), 700 mg (6/14/2022), 700 mg (6/28/2022), 700 mg (7/20/2022)  oxaliplatin (ELOXATIN) chemo infusion, 65 mg/m2 = 112 45 mg (76 5 % of original dose 85 mg/m2), Intravenous, Once, 7 of 8 cycles  Dose modification: 65 mg/m2 (original dose 85 mg/m2, Cycle 1, Reason: Anticipated Tolerance)  Administration: 112 45 mg (4/19/2022), 112 45 mg (5/3/2022), 112 45 mg (5/17/2022), 112 45 mg (5/31/2022), 112 45 mg (6/14/2022), 112 45 mg (6/28/2022), 112 45 mg (7/20/2022)  fluorouracil (ADRUCIL) ambulatory infusion Soln, 1,200 mg/m2/day = 4,150 mg, Intravenous, Over 46 hours, 7 of 8 cycles  Dose modification: 1,000 mg/m2/day (original dose 1,200 mg/m2/day, Cycle 8, Reason: Dose Not Tolerated, Comment: dose reduction due to diarrhea), 1,000 mg/m2/day (original dose 1,200 mg/m2/day, Cycle 7, Reason: Dose Not Tolerated, Comment: dose reduction due to diarrhea)     7/22/2022 - 7/22/2022 Chemotherapy    pegfilgrastim (NEULASTA), 6 mg, Subcutaneous, Once, 0 of 1 cycle  Administration: 6 mg (7/22/2022)     7/27/2022 Adverse Reaction    3/2022 - locally advanced, unresectable adenocarcinoma of the pancreas     4/19/2022 - start FOLFIRINOX with 20% dose reduction in oxaliplatin and irinotecan due to age    5/2022 - 5FU dose reduced by 20% due to diarrhea    7/12/2022 - cycle 7 delayed due to diarrhea - bolus 5-FU further reduced by a total of 30% and infusional 5-FU dose reduced by 20%        Chemotherapy    Xeloda 1000mg BID M-F concurrent with RT     8/22/2022 - 9/29/2022 Radiation    The patient saw @[facility]@ for radiation treatment   This is the current list of radiation treatment:  Plan ID Energy Fractions Dose per Fraction (cGy) Dose Correction (cGy) Total Dose Delivered (cGy) Elapsed Days   Abdomen 6X 25 / 25 180 0 4,500 35   CD Abdomen 6X 3 / 3 180 0 540 2      Treatment dates:  8/22/2022 - 9/29/2022 11/8/2022 -  Chemotherapy    paclitaxel protein-bound (ABRAXANE) IVPB, 100 mg/m2 = 162 mg (80 % of original dose 125 mg/m2), Intravenous, Once, 1 of 6 cycles  Dose modification: 100 mg/m2 (original dose 125 mg/m2, Cycle 1, Reason: Anticipated Tolerance, Comment: dose reduction due to age and PS), 100 mg/m2 (original dose 125 mg/m2, Cycle 1, Reason: Anticipated Tolerance, Comment: 20% dose reduction due to age and PS)  Administration: 162 mg (11/8/2022)  gemcitabine (GEMZAR) infusion, 800 mg/m2 = 1,295 8 mg (80 % of original dose 1,000 mg/m2), Intravenous, Once, 1 of 6 cycles  Dose modification: 800 mg/m2 (original dose 1,000 mg/m2, Cycle 1, Reason: Anticipated Tolerance, Comment: 20% dose reduction due to age and PS)  Administration: 1,295 8 mg (11/8/2022)      Chemotherapy    fluorouracil (ADRUCIL), 400 mg/m2 = 690 mg, Intravenous, Once, 7 of 8 cycles    Dose modification: 320 mg/m2 (original dose 400 mg/m2, Cycle 4), 280 mg/m2 (original dose 400 mg/m2, Cycle 7, Reason: Dose Not Tolerated, Comment: 30% dose reduction due to diarrhea)    Administration: 690 mg (4/19/2022), 690 mg (5/3/2022), 690 mg (5/17/2022), 555 mg (5/31/2022), 555 mg (6/14/2022), 555 mg (6/28/2022), 485 mg (7/20/2022)        pegfilgrastim (NEULASTA), 6 mg, Subcutaneous, Once, 5 of 6 cycles    Administration: 6 mg (6/2/2022), 6 mg (6/16/2022), 6 mg (6/30/2022), 6 mg (5/20/2022)        irinotecan (CAMPTOSAR) chemo infusion, 125 mg/m2 = 216 mg (69 4 % of original dose 180 mg/m2), Intravenous, Once, 7 of 8 cycles    Dose modification: 125 mg/m2 (original dose 180 mg/m2, Cycle 1, Reason: Anticipated Tolerance, Comment: Anticipated tolerance and Bilirubin being between 1-2), 90 mg/m2 (original dose 180 mg/m2, Cycle 4, Reason: Dose Not Tolerated, Comment: 50% dose reduction from original dose due to diarrhea)    Administration: 216 mg (4/19/2022), 216 mg (5/3/2022), 220 mg (5/17/2022), 156 mg (5/31/2022), 160 mg (6/14/2022), 160 mg (6/28/2022), 160 mg (7/20/2022)        leucovorin calcium IVPB, 692 mg, Intravenous, Once, 7 of 8 cycles    Administration: 700 mg (4/19/2022), 700 mg (5/3/2022), 700 mg (5/17/2022), 700 mg (5/31/2022), 700 mg (6/14/2022), 700 mg (6/28/2022), 700 mg (7/20/2022)        oxaliplatin (ELOXATIN) chemo infusion, 65 mg/m2 = 112 45 mg (76 5 % of original dose 85 mg/m2), Intravenous, Once, 7 of 8 cycles    Dose modification: 65 mg/m2 (original dose 85 mg/m2, Cycle 1, Reason: Anticipated Tolerance)    Administration: 112 45 mg (4/19/2022), 112 45 mg (5/3/2022), 112 45 mg (5/17/2022), 112 45 mg (5/31/2022), 112 45 mg (6/14/2022), 112 45 mg (6/28/2022), 112 45 mg (7/20/2022)        fluorouracil (ADRUCIL) ambulatory infusion Soln, 1,200 mg/m2/day = 4,150 mg, Intravenous, Over 46 hours, 7 of 8 cycles    Dose modification: 1,000 mg/m2/day (original dose 1,200 mg/m2/day, Cycle 8, Reason: Dose Not Tolerated, Comment: dose reduction due to diarrhea), 1,000 mg/m2/day (original dose 1,200 mg/m2/day, Cycle 7, Reason: Dose Not Tolerated, Comment: dose reduction due to diarrhea)    pegfilgrastim (NEULASTA), 6 mg, Subcutaneous, Once, 0 of 1 cycle    Administration: 6 mg (7/22/2022)    paclitaxel protein-bound (ABRAXANE) IVPB, 100 mg/m2 = 162 mg (80 % of original dose 125 mg/m2), Intravenous, Once, 0 of 6 cycles    Dose modification: 100 mg/m2 (original dose 125 mg/m2, Cycle 1, Reason: Anticipated Tolerance, Comment: dose reduction due to age and PS), 100 mg/m2 (original dose 125 mg/m2, Cycle 1, Reason: Anticipated Tolerance, Comment: 20% dose reduction due to age and PS)        gemcitabine (GEMZAR) infusion, 800 mg/m2 = 1,295 8 mg (80 % of original dose 1,000 mg/m2), Intravenous, Once, 0 of 6 cycles    Dose modification: 800 mg/m2 (original dose 1,000 mg/m2, Cycle 1, Reason: Anticipated Tolerance, Comment: 20% dose reduction due to age and PS)    3/2022 - locally advanced, unresectable adenocarcinoma of the pancreas     4/19/2022 - start FOLFIRINOX with 20% dose reduction in oxaliplatin and irinotecan due to age     5/2022 - 5FU dose reduced by 20% due to diarrhea     7/12/2022 - cycle 7 delayed due to diarrhea - bolus 5-FU further reduced by a total of 30% and infusional 5-FU dose reduced by 20%    9/2022 - xeloda/RT for progression in the pancreas    10/2022 - progression of disease with peritoneal carcinomatosis           Interval History: patient presents for follow up of his metastatic pancreatic cancer  He got 1 dose of gem/abraxane and has gotten very weak  He has lost 4 lbs since I last saw him  He says that he isn't eating because he doesn't have access to food and does not know how to make himself food  He doesn't have much of an appetite and is too weak to go out and get food himself  He lives by himself and doesn't have any family support  He was falling asleep while talking to me  He states that he didn't sleep last night because of the anxiety that is associated with this visit  No falls  No fevers  He states that he is always cold  He denies any N/V/Abd pain  No diarrhea  He cancelled day 8 of his chemo cycle due to weakness last week            REVIEW OF SYSTEMS:  Please note that a 14-point review of systems was performed to include Constitutional, HEENT, Respiratory, CVS, GI, , Musculoskeletal, Integumentary, Neurologic, Rheumatologic, Endocrinologic, Psychiatric, Lymphatic, and Hematologic/Oncologic systems were reviewed and are negative unless otherwise stated in HPI  Positive and negative findings pertinent to this evaluation are incorporated into the history of present illness  ECOG PS: 2    PROBLEM LIST:  Patient Active Problem List   Diagnosis   • Type 2 diabetes mellitus without complication, with long-term current use of insulin (HCC)   • GERD (gastroesophageal reflux disease)   • Pancreatic mass   • History of pulmonary embolism   • Hyperbilirubinemia   • Anxiety about health   • Pancreatic adenocarcinoma (HCC)   • Chemotherapy induced neutropenia (HCC)   • Hypokalemia   • Mild protein-calorie malnutrition (HCC)   • Acute exacerbation of chronic obstructive pulmonary disease ([address] )   • Dehydration       Assessment / Plan: I recommended that the patient consider going to the hospital because he was lethargic and was barely staying awake during our conversation  When he concentrated he was able to stay awake and talk to me  I am not sure that he is in a safe situation in his home  I need to make sure he has proper nutrition support while on chemo  I have involved my  and asked for a referral to the area on aging for a home visit  Until this overall situation improves, I cannot safely give him chemo and I put his treatment plan on hold  I will keep his IVF scheduled weekly and I will see him in 2 weeks in follow up  He would not consider going to the ER  I spent 30 minutes on chart review, face to face counseling time, coordination of care and documentation  Past Medical History:   has a past medical history of Ambulates with cane, Anxiety, Depression, Diabetes mellitus ([address] ), GERD (gastroesophageal reflux disease), History of pulmonary embolus (PE), Hyperlipidemia, Multiple thyroid nodules, Pancreatic cancer ([address] ), and Pancreatic mass  PAST SURGICAL HISTORY:   has a past surgical history that includes Cataract extraction (Right);  Appendectomy; Tunneled venous port placement (Right, 4/11/2022); and FL guided central venous access device insertion (4/11/2022)  CURRENT MEDICATIONS  Current Outpatient Medications   Medication Sig Dispense Refill   • Alcohol Swabs 70 % PADS May substitute brand based on insurance coverage  Check glucose BID  100 each 0   • ALPRAZolam (XANAX) 0 5 mg tablet Take 0 5 tablets (0 25 mg total) by mouth 3 (three) times a day as needed for anxiety 90 tablet 0   • apixaban (ELIQUIS) 5 mg Take 5 mg by mouth 2 (two) times a day     • Blood Glucose Monitoring Suppl (OneTouch Verio Reflect) w/Device KIT May substitute brand based on insurance coverage  Check glucose BID  1 kit 0   • diphenoxylate-atropine (LOMOTIL) 2 5-0 025 mg per tablet Take 1 tablet by mouth 4 (four) times a day as needed for diarrhea 60 tablet 0   • glucose blood (OneTouch Verio) test strip May substitute brand based on insurance coverage  Check glucose BID  100 each 0   • Klor-Con M20 20 MEQ tablet TAKE 2 TABLETS BY MOUTH DAILY 180 tablet 1   • metFORMIN (GLUCOPHAGE) 1000 MG tablet Take 1,000 mg by mouth 2 (two) times a day with meals     • metFORMIN (GLUCOPHAGE-XR) 500 mg 24 hr tablet Take 500 mg by mouth 2 (two) times a day     • Multiple Vitamin (MULTIVITAMIN ADULT PO) Take 1 tablet by mouth daily     • omeprazole (PriLOSEC) 20 mg delayed release capsule      • omeprazole (PriLOSEC) 40 MG capsule Take 40 mg by mouth every evening     • ondansetron (ZOFRAN) 8 mg tablet Take 1 tablet (8 mg total) by mouth every 8 (eight) hours as needed for nausea or vomiting 30 tablet 3   • OneTouch Delica Lancets 33T MISC May substitute brand based on insurance coverage   Check glucose BID  100 each 0   • pravastatin (PRAVACHOL) 20 mg tablet Take 20 mg by mouth daily     • prochlorperazine (COMPAZINE) 10 mg tablet Take 1 tablet (10 mg total) by mouth every 6 (six) hours as needed for nausea or vomiting 30 tablet 3   • traMADol (ULTRAM) 50 mg tablet Take 1 tablet (50 mg total) by mouth every 6 (six) hours as needed for moderate pain 60 tablet 0   • polyethylene glycol (MIRALAX) 17 g packet Take 17 g by mouth daily (Patient not taking: Reported on 11/3/2022)       No current facility-administered medications for this visit  [unfilled]    SOCIAL HISTORY:   reports that he quit smoking about 57 years ago  His smoking use included cigarettes  He started smoking about 62 years ago  He has never used smokeless tobacco  He reports that he does not currently use alcohol  He reports that he does not use drugs  FAMILY HISTORY:  family history includes Alcohol abuse in his mother; Diabetes in his father; Throat cancer in his brother  ALLERGIES:  is allergic to aleve [naproxen]  Physical Exam:  Vital Signs:   Visit Vitals  /74 (BP Location: Left arm, Patient Position: Sitting, Cuff Size: Adult)   Pulse 88   Temp 98 3 °F (36 8 °C) (Temporal)   Resp 16   Ht 5' 7" (1 702 m)   Wt 52 2 kg (115 lb)   SpO2 99%   BMI 18 01 kg/m²   Smoking Status Former   BSA 1 6 m²     Body mass index is 18 01 kg/m²  Body surface area is 1 6 meters squared  GEN: lethargic and cachectic  HEENT- No pallor, icterus, cyanosis, no oral mucosal lesions,   LAD - no palpable cervical, clavicle, axillary, inguinal LAD  Heart- normal S1 S2, regular rate and rhythm, No murmur, rubs     Lungs- clear breathing sound bilateral    Abdomen- soft, Non tender, bowel sounds present  Extremities- No cyanosis, clubbing, edema  Neuro- No focal neurological deficit    Labs:  Lab Results   Component Value Date    WBC 2 57 (L) 11/22/2022    HGB 10 1 (L) 11/22/2022    HCT 31 5 (L) 11/22/2022    MCV 92 11/22/2022     11/22/2022     Lab Results   Component Value Date    SODIUM 136 11/22/2022    K 4 0 11/22/2022     11/22/2022    CO2 26 11/22/2022    AGAP 8 11/22/2022    BUN 28 (H) 11/22/2022    CREATININE 0 94 11/22/2022    GLUC 215 (H) 11/22/2022    CALCIUM 8 6 11/22/2022    AST 25 11/22/2022    ALT 31 11/22/2022 ALKPHOS 201 (H) 11/22/2022    TP 7 3 11/22/2022    TBILI 0 40 11/22/2022    EGFR 76 11/22/2022

## 2023-09-20 NOTE — ANESTHESIA POSTPROCEDURE EVALUATION
Anesthesia Post Evaluation    Patient: Meir Murry    Procedure(s) Performed: Procedure(s) (LRB):  FUSION, SPINE, LUMBAR, PLIF, USING COMPUTER-ASSISTED NAVIGATION (N/A)    Final Anesthesia Type: general (/Regional//MAC)      Patient location during evaluation: PACU  Post-procedure mental status: @ basline.  Pain management: adequate    PONV status: See postop meds for drugs used to control n/v if any.  Anesthetic complications: no      Cardiovascular status: blood pressure returned to baseline  Respiratory status: @ baseline.  Hydration status: euvolemic            Vitals Value Taken Time   BP 88/56 09/20/23 1621   Temp 36.6 °C (97.9 °F) 09/20/23 1530   Pulse 63 09/20/23 1625   Resp 17 09/20/23 1625   SpO2 96 % 09/20/23 1624   Vitals shown include unvalidated device data.      No case tracking events are documented in the log.      Pain/Mary Lou Score: Mary Lou Score: 3 (9/20/2023  4:00 PM)

## 2023-09-20 NOTE — ANESTHESIA PROCEDURE NOTES
Arterial    Diagnosis: cad    Patient location during procedure: holding area    Staffing  Authorizing Provider: Zachary Bautista MD  Performing Provider: Yaw Shipley CRNA    Staffing  Performed by: Yaw Shipley CRNA  Authorized by: Zachary Bautista MD    Anesthesiologist was present at the time of the procedure.    Preanesthetic Checklist  Completed: patient identified, IV checked, site marked, risks and benefits discussed, surgical consent, monitors and equipment checked, pre-op evaluation, timeout performed and anesthesia consent givenArterial  Skin Prep: chlorhexidine gluconate  Local Infiltration: lidocaine  Orientation: right  Location: radial    Catheter Size: 20 G  Catheter placement by Anatomical landmarks. Heme positive aspiration all ports. Insertion Attempts: 1  Assessment  Dressing: secured with tape and tegaderm  Patient: Tolerated well

## 2023-09-20 NOTE — OP NOTE
Neurosurgery Operative Note  [facility]      Patient Name: Meir Murry  MRN: [id number]  CSN:  [id number]  : 1965  Age:58 yrs  Sex:male  Admit Date: 2023    Surgery date: 2023   Surgeon(s) and Role:     * Abril Tyler MD - Primary  Assistant(s): None    Preop/ Preprocedure Diagnosis: Grade I L4-5 anterior spondylolisthesis with lumbar stenosis, adjacent level degeneration with previous L2-3, L3-4 posterior lumbar interbody fusion    Postop/ Postprocedure Diagnosis: Grade I L4-5 anterior spondylolisthesis with lumbar stenosis, adjacent level degeneration with previous L2-3, L3-4 posterior lumbar interbody fusion      Surgery:     1) Redo lumbar surgery involving posterior lateral fusion placement of bilateral L5 Medtronic Solera pedicle screws.  Extension of posterior fusion incorporating previously placed Kalitec pedicle screws on the right from L3-4 to L4-5 and on the left from L2-3, L3-4, and L4-5         A.  L5- bilateral 6.5 x 45 mm pedicle screws were placed            B. Placement of dina measuring 70 mm on the right from L3 to L5        C. Placement of dina measuring 100 mm on the left from L2 to L5        2) Decompressive laminectomy at L4-5     3) Placement of local autograft, allograft, DBM, and Hermes Strips bilaterally     4) Use of Medtronic Stealth navigation and intraoperative O Arm for placement of pedicle screws and to verify final placement of spinal instrumentation     5) Use of intraoperative C-arm      6) Use of intraoperative neuromonitoring with impedance testing of lumbar pedicle screws, with confirmed impedance of greater than 25 in each screw      Findings: Intraoperative decompression at L4-5, which had severe stenosis and anterior spondylolisthesis.  The final C arm and O arm images demonstrated that the instrumentation was in good position.      Fluids: See anesthesia record  Estimated Blood Loss: 250 cc  Anesthesia Type: General  Blood  Products: none  Specimens:  * No specimens in log *    Implants/Grafts:   Implant Name Type Inv. Item Serial No.  Lot No. LRB No. Used Action   Medtronic 6.5 X 45 Screw    [employer]  N/A 2 Implanted   GRAFT CHIPS FD 4-10MM 15CC - [id number]  GRAFT CHIPS FD 4-10MM 15CC  [facility]  N/A 1 Implanted   PUTTY BONE MINGO DBM 10CC - [id number]  PUTTY BONE MINGO DBM 10CC [id number] [employer] [place]  N/A 1 Implanted   PUTTY MINGO DBM 10X1CM - [id number]  PUTTY MINGO DBM 10X1CM [id number] [employer] [place]  N/A 1 Implanted   Medrotronic Set Screws      N/A 2 Implanted   KISHORE HORIZON CURV 5.5CCM 70MM - [id number]  KISHORE HORIZON CURV 5.5CCM 70MM  [employer] [place]  N/A 1 Implanted   Medtronic 100mm Kishore    [employer]  N/A 1 Implanted     Drain(s), Tube(s), Packing: Hemovac drain    Complications: No immediate    Preoperative Indications:     Mr. Murry is a 58 y.o. male who has a past medical history significant for hypertension, GERD, osteoarthritis, anxiety, and depression.  He presented as a follow-up patient in the neurosurgery clinic for progressively worsening low back pain and right leg pain for the last 2 years.  Mr. Murry is s/p a L2-3, L3-4 laminectomy and TLIF ([facility]) on 03/18/2020 by Dr. Hickman.  The patient had a normal motor and sensory neurological exam.       I reviewed pertinent imaging studies with the patient and his wife.  A MRI lumbar spine with and without gadolinium demonstrated mild levoscoliosis.  Postoperative changes with a left L2 pedicle screw and bilateral pedicle screws at L3 and L4. There were interbody grafts in place at L2-3 and L3-4.  Grade I anterior spondylolisthesis of L4 on L5 with degenerative disc disease and Modic changes. There was severe stenosis at L4-5 with bilateral neural foraminal narrowing.  Left neuroforaminal narrowing at L5-S1.  There was no abnormal enhancement.     I discussed extensively with Mr. Murry and his wife that his symptoms were related to adjacent L4-5  level anterior spondylolisthesis and degeneration next to his fusion segment at L2-3 and L3-4.  Because the patient had optimized conservative therapy without significant improvement, he was considered a candidate for surgery.  This surgery would involve a redo lumbar surgery with a L4-5 laminectomy with extension of L4-5 fusion from L2-3 and L3-4 instrumentation.  Although there was no guarantee for improvement, I anticipated that he would improve postoperatively.  I reviewed the risks, benefits, and alternatives of this surgery.  All questions were answered to his satisfaction.         Operative Procedure:  The patient was brought to the operating room.  General endotracheal intubation was instituted by the anesthesia team. The patient was then turned prone onto a Sudhir frame with his arms and legs appropriately padded.  Neuromonitoring with SSEP, EMG, and MEP was performed throughout the procedure.    It was anticipated that a midline incision be made on the patient's lower back.  He had 2 well healed parasagittal scars in his lumbar region.  A C-arm image was performed that correctly identified the L4 and L5 levels with specific identification of the previously placed pedicle screws at L2, L3, and L4 before proceeding.  The patient's back was prepped and draped in a normal sterile fashion.  A timeout was performed that correctly identified the patient, preoperative antibiotics, and procedure.    A solution of 1% Lidocaine with epinephrine was infused into the anticipated incisional site. The initial skin incision was made with an 10 scalpel blade.  Bovie cautery was used for hemostasis and for carrying out this incision to the spinous processes.  Scar tissue from the patient's previous surgery was dissected.  The Bovie was used for dissection from the midline to the lateral aspects of the bilateral facets from L2 to L5.  Cerebellar and Zelpi retractors were placed for visualization.  The tops of the pedicle  screws and adjoining dina was identified on the right at L3 and L4.  The tops of the pedicle screws and adjoining dina was identified on the left at L2, L3, and L4.  A C-arm image was performed to identify the lumbar hardware and adjacent pedicles at the L5 level.  All the top caps on the Kalitec pedicle screws were removed with anticipation that these would be replaced a the end of the case.  The bilateral rods were removed with the understanding that they would be replaced with longer rods.     A clamp with the Medtronic Stealth system was placed on the L3 spinous process.  An O arm image was obtained.    With intraoperative guidance from the Medtronic Stealth system and O arm images, bilateral pedicle screws at L5 were placed.  At each level, the entry point for the screw was determined with a navigated drill.  Then, a series of steps involving the navigated, power-generated [id number]-got-[id number] tap, pedicle feeler without a navigated ball probe, pedicle feeler with a navigated ball probe, with final placement of a pedicle screw.  This was performed bilaterally at L5 with intraoperative navigation.  Posterior lateral fusion with instrumentation was achieved with the Medtronic Solera system with bilateral pedicle screws in L5.      Extension of posterior fusion incorporating previously placed Kalitec pedicle screws on the right from L3-4 to L4-5 and on the left from L2-3, L3-4, and L4-5 was achieved.          A.  L5- bilateral 6.5 x 45 mm pedicle screws were placed            B. Placement of dina measuring 70 mm on the right from L3 to L5        C. Placement of dina measuring 100 mm on the left from L2 to L5       Use of intraoperative neuromonitoring with impedance testing was completed for the bilateral L5 pedicle screws, with confirmed impedance greater than 25 in each screw.  Images with the O arm confirmed that this instrumentation was in good position.      A laminectomy at the L4-5 level was then performed.  A  rongeur was used to remove the spinous processes of L4 and L5 .  Using a technique with a curved curette and Kerrison punch, a laminectomy at L4-5 was completed.  There was significantly hypertrophied ligamentum flavum at the L4-5 level with associated anterior spondylolisthesis.  It is noted that there was extensive scar tissue from the patient's previous surgery that was encountered during the dissection.      A 70 mm dina was placed between the L3, L4, and L5 pedicle screws on the right.  A 100 mm dina was placed between the L2, L3, L4, and L5 pedicle screws on the left.  Both of these rods required bending prior to final placement.  All the vendor-specific set screw caps were secured.  The surgical wound was copiously irrigated.  Decortication was performed with the drill.  I-Standtronic Troutman Strips were placed bilaterally, spanning from L4 to L5 laterally.  A mixture of morselized local autograft, allograft, and DBM was placed laterally along the pedicle screws.       C-arm imaging with AP and lateral views demonstrated that the instrumentation was in proper position. Closure was then in order.  The fascia was infused with a solution of 0.5% Bupivicaine with epinephrine.  A medium size Hemovac drain was placed under the fascia and sutured into place with an 0 Vicryl. The fascia was closed with interrupted 0 Vicryl sutures. The subcutaneous layer was closed with interrupted, buried 2-0 Vicryl sutures. The skin was everted and closed with staples.  Xeroform gauze, 4x4s, and Medipore tape were placed on top of the skin as the dressing.     The patient was brought back into the supine position and extubated.  He was transported to the PACU for further care.         Abril Tyler MD  Neurosurgery

## 2023-09-20 NOTE — DISCHARGE INSTRUCTIONS
POSTERIOR THORACO-LUMBAR/ LUMBAR FUSION  DISCHARGE INSTRUCTIONS      1.  Wear your TLSO Brace when sitting upright and when you are out of bed.    Your brace will likely be discontinued after 3 months.      2.   Keep your incision clean and dry.    Your sutures or staples will be removed at your first postoperative follow-up appointment in approximately 14 days.   Place clean gauze and tape over your wound daily until your sutures or staples are removed.  Wait at least 72 hours from the time of your surgery to take a shower.  After showering, pat your incision dry and replace the wound dressing.  Do not immerse your incision in water for 4-6 weeks (e.g. bath tub, hot tub, swimming pool).      3.  Activity restrictions:  No lifting greater than 15 pounds for 3 months.  No bending, stooping, or twisting.  Avoid sitting in one position for over 30 minutes at a time.  No impact exercise or contact sports for at least 3 months.  No driving for at least 2 weeks.  To resume driving short distances (<30 minutes), you must be off of your narcotic medications and be able to comfortably brake suddenly, should the need arise.    Get up and walk.      4.  Contact your Neurosurgeon if the following occurs:  Signs and symptoms of infection, including fever above 101.5 degrees Fahrenheit and/or chills.  Redness, swelling, warmth, or drainage from the incision.  Any lasting changes in sensation, numbness, and/or tingling.  Increased weakness or increased pain.  Swelling of the foot and/or lower leg with calf pain.    Neurosurgery has office hours Monday through Friday, 8:00 AM to 4:00 PM except for holidays. There is an answering service available during non-office hours, with 24 hours neurosurgery coverage.  Report to the Emergency Department if you need immediate medical assistance.      Because you have had a fusion, you are not to take steroidal medications or non-steroidal anti-inflammatory (NSAID) medications such as Motrin/  Ibuprofen or Naprosyn/ Naproxen unless agreed upon with your neurosurgeon.      Please contact Dr. Tyler's office for any questions or concerns.  Typically, your first follow-up appointment after a posterior thoraco-lumbar/ lumbar fusion surgery is approximately 14 days from the date of your operation.  At this time, sutures or staples will be removed.  For your second postoperative appointment in 4-6 weeks, an x-ray of your lumbar spine will be arranged in Radiology before your neurosurgery appointment.

## 2023-09-20 NOTE — ANESTHESIA PREPROCEDURE EVALUATION
09/20/2023  Meir Murry is a 58 y.o., male with medical problems listed in the EMR      Pre-op Assessment    I have reviewed the Patient Summary Reports.     I have reviewed the Nursing Notes. I have reviewed the NPO Status.   I have reviewed the Medications.     Review of Systems  Cardiovascular:   Exercise tolerance: good        Physical Exam  General: Well nourished and Cooperative    Airway:  Mallampati: II   Mouth Opening: Normal  TM Distance: Normal  Tongue: Normal  Neck ROM: Normal ROM    Dental:  Intact    Chest/Lungs:  Clear to auscultation    Heart:  Rhythm: Regular Rhythm        Anesthesia Plan  Type of Anesthesia, risks & benefits discussed:    Anesthesia Type: Gen ETT  Intra-op Monitoring Plan: Standard ASA Monitors and Art Line  Post Op Pain Control Plan: multimodal analgesia  Induction:  IV  Informed Consent: Informed consent signed with the Patient and all parties understand the risks and agree with anesthesia plan.  All questions answered.   ASA Score: 2  Day of Surgery Review of History & Physical: H&P Update referred to the surgeon/provider.    Ready For Surgery From Anesthesia Perspective.     .    I explained anesthesia plan to patient/responsbile party if available.  Anesthesia consent done going over the material facts, risks, complications & alternatives, obtained which includes the possibility of altering the anesthesia plan.  I reviewed problem list, prior to admission medication list, appropriate labs, any workup, Xray, EKG etc noted below.  Patients condition is satisfactory to proceed with anesthesia plan unless otherwise noted (see anesthesia chart for details of the anesthesia plan carried out).      Pre-operative evaluation for Procedure(s) (LRB):  FUSION, SPINE, LUMBAR, PLIF, USING COMPUTER-ASSISTED NAVIGATION (N/A)    /74   Pulse 70   Temp 36.6 °C (97.8 °F) (Oral)   " Resp 17   Ht 5' 11" (1.803 m)   Wt 99.2 kg (218 lb 11.1 oz)   SpO2 97%   BMI 30.50 kg/m²     Patient Active Problem List   Diagnosis    Gastroesophageal reflux disease without esophagitis    Environmental allergies    Hyperlipidemia    Hypertension    Obesity    DDD (degenerative disc disease), lumbar       Review of patient's allergies indicates:   Allergen Reactions    Morphine Itching     Other reaction(s): Not Indicated       Current Outpatient Medications   Medication Instructions    celecoxib (CELEBREX) 200 mg, Oral, As needed (PRN)    cetirizine (ZYRTEC) 10 mg, Oral, Daily    diclofenac sodium (VOLTAREN) 1 % Gel Topical (Top)    fluticasone propionate (FLONASE) 50 mcg/actuation nasal spray 2 sprays, Each Nostril, Daily    gabapentin (NEURONTIN) 600 mg, Oral, 3 times daily PRN    HYDROcodone-acetaminophen (NORCO) 5-325 mg per tablet 1 tablet, Oral, 2 times daily    ibuprofen (ADVIL,MOTRIN) 800 mg, Oral, 3 times daily, As needed    irbesartan (AVAPRO) 300 mg, Oral, Daily    latanoprost 0.005 % ophthalmic solution 1 drop, Left Eye, Nightly    montelukast (SINGULAIR) 10 mg, Oral, Daily    nabumetone (RELAFEN) 750 mg, Oral, 2 times daily    naproxen sodium (ANAPROX) 220 mg, Oral, 2 times daily PRN    omeprazole (PRILOSEC) 20 mg, Oral, Daily    pantoprazole (PROTONIX) 20 mg, Oral, Every morning    pravastatin (PRAVACHOL) 20 mg, Oral, Daily    pregabalin (LYRICA) 200 mg, Oral, 2 times daily PRN    tiZANidine (ZANAFLEX) 4 mg, Oral, As needed (PRN)       Past Surgical History:   Procedure Laterality Date    BACK SURGERY      GALLBLADDER SURGERY  2010    SINUS SURGERY      TOTAL KNEE ARTHROPLASTY Bilateral        Social History     Socioeconomic History    Marital status:    Tobacco Use    Smoking status: Some Days     Types: Cigars     Passive exposure: Never    Smokeless tobacco: Never   Substance and Sexual Activity    Alcohol use: Yes     Comment: occasionally    Drug " "use: Never       Lab Results   Component Value Date    WBC 7.76 09/15/2023    HGB 15.1 09/15/2023    HCT 46.1 09/15/2023    MCV 98.3 (H) 09/15/2023     09/15/2023          BMP  Lab Results   Component Value Date    HCT 46.1 09/15/2023     09/15/2023    K 4.1 09/15/2023    BUN 18.9 09/15/2023    CREATININE 0.81 09/15/2023    CALCIUM 9.2 09/15/2023        INR  No results for input(s): "PT", "INR", "PROTIME", "APTT" in the last 72 hours.        Diagnostic Studies:      EKG:  Results for orders placed or performed in visit on 09/15/23   EKG 12-lead    Collection Time: 09/15/23  7:01 AM    Narrative    Test Reason : Z01.818,M43.16,M48.062,M51.36,I35.1,K21.9,    Vent. Rate : 083 BPM     Atrial Rate : 083 BPM     P-R Int : 178 ms          QRS Dur : 086 ms      QT Int : 358 ms       P-R-T Axes : 057 -33 058 degrees     QTc Int : 420 ms    Normal sinus rhythm  Possible Left atrial enlargement  Left axis deviation  Low voltage QRS  Abnormal ECG  Confirmed by Storm Coronel MD ([id number]) on 9/15/2023 8:16:44 PM    Referred By: LATESHA WEIR           Confirmed By:Storm Coronel MD          "

## 2023-09-21 PROCEDURE — 25000003 PHARM REV CODE 250: Performed by: INTERNAL MEDICINE

## 2023-09-21 PROCEDURE — 94799 UNLISTED PULMONARY SVC/PX: CPT

## 2023-09-21 PROCEDURE — 99900035 HC TECH TIME PER 15 MIN (STAT)

## 2023-09-21 PROCEDURE — 25000003 PHARM REV CODE 250: Performed by: NEUROLOGICAL SURGERY

## 2023-09-21 PROCEDURE — 97162 PT EVAL MOD COMPLEX 30 MIN: CPT

## 2023-09-21 PROCEDURE — 97165 OT EVAL LOW COMPLEX 30 MIN: CPT

## 2023-09-21 PROCEDURE — 99024 POSTOP FOLLOW-UP VISIT: CPT | Mod: POP,,, | Performed by: NEUROLOGICAL SURGERY

## 2023-09-21 PROCEDURE — 25000242 PHARM REV CODE 250 ALT 637 W/ HCPCS: Performed by: NEUROLOGICAL SURGERY

## 2023-09-21 PROCEDURE — 11000001 HC ACUTE MED/SURG PRIVATE ROOM

## 2023-09-21 PROCEDURE — 99024 PR POST-OP FOLLOW-UP VISIT: ICD-10-PCS | Mod: POP,,, | Performed by: NEUROLOGICAL SURGERY

## 2023-09-21 PROCEDURE — 63600175 PHARM REV CODE 636 W HCPCS: Performed by: NEUROLOGICAL SURGERY

## 2023-09-21 RX ORDER — DOCUSATE SODIUM 100 MG/1
100 CAPSULE, LIQUID FILLED ORAL 2 TIMES DAILY
Status: DISCONTINUED | OUTPATIENT
Start: 2023-09-21 | End: 2023-09-24 | Stop reason: HOSPADM

## 2023-09-21 RX ORDER — SULFAMETHOXAZOLE AND TRIMETHOPRIM 800; 160 MG/1; MG/1
1 TABLET ORAL 2 TIMES DAILY
Status: DISCONTINUED | OUTPATIENT
Start: 2023-09-21 | End: 2023-09-22

## 2023-09-21 RX ADMIN — GABAPENTIN 600 MG: 300 CAPSULE ORAL at 08:09

## 2023-09-21 RX ADMIN — CETIRIZINE HYDROCHLORIDE 10 MG: 10 TABLET, FILM COATED ORAL at 08:09

## 2023-09-21 RX ADMIN — SULFAMETHOXAZOLE AND TRIMETHOPRIM 1 TABLET: 800; 160 TABLET ORAL at 08:09

## 2023-09-21 RX ADMIN — HYDROCODONE BITARTRATE AND ACETAMINOPHEN 1 TABLET: 10; 325 TABLET ORAL at 08:09

## 2023-09-21 RX ADMIN — PANTOPRAZOLE SODIUM 40 MG: 40 TABLET, DELAYED RELEASE ORAL at 08:09

## 2023-09-21 RX ADMIN — CEFAZOLIN SODIUM 2 G: 2 SOLUTION INTRAVENOUS at 09:09

## 2023-09-21 RX ADMIN — MUPIROCIN: 20 OINTMENT TOPICAL at 08:09

## 2023-09-21 RX ADMIN — MONTELUKAST 10 MG: 10 TABLET, FILM COATED ORAL at 08:09

## 2023-09-21 RX ADMIN — HYDROMORPHONE HYDROCHLORIDE 1 MG: 2 INJECTION INTRAMUSCULAR; INTRAVENOUS; SUBCUTANEOUS at 08:09

## 2023-09-21 RX ADMIN — LATANOPROST 1 DROP: 50 SOLUTION OPHTHALMIC at 08:09

## 2023-09-21 RX ADMIN — PRAVASTATIN SODIUM 20 MG: 10 TABLET ORAL at 08:09

## 2023-09-21 RX ADMIN — FLUTICASONE PROPIONATE 100 MCG: 50 SPRAY, METERED NASAL at 12:09

## 2023-09-21 RX ADMIN — HYDROCODONE BITARTRATE AND ACETAMINOPHEN 1 TABLET: 10; 325 TABLET ORAL at 01:09

## 2023-09-21 RX ADMIN — SULFAMETHOXAZOLE AND TRIMETHOPRIM 1 TABLET: 800; 160 TABLET ORAL at 12:09

## 2023-09-21 RX ADMIN — HYDROCODONE BITARTRATE AND ACETAMINOPHEN 1 TABLET: 10; 325 TABLET ORAL at 05:09

## 2023-09-21 RX ADMIN — CEFAZOLIN SODIUM 2 G: 2 SOLUTION INTRAVENOUS at 05:09

## 2023-09-21 RX ADMIN — SODIUM CHLORIDE: 9 INJECTION, SOLUTION INTRAVENOUS at 12:09

## 2023-09-21 RX ADMIN — CEFAZOLIN SODIUM 2 G: 2 SOLUTION INTRAVENOUS at 01:09

## 2023-09-21 NOTE — NURSING
Nurses Note -- 4 Eyes      9/20/2023   7:14 PM      Skin assessed during: Admit      [x] No Altered Skin Integrity Present    [x]Prevention Measures Documented      [] Yes- Altered Skin Integrity Present or Discovered   [] LDA Added if Not in Epic (Describe Wound)   [] New Altered Skin Integrity was Present on Admit and Documented in LDA   [] Wound Image Taken    Wound Care Consulted? No    Attending Nurse:  Romina Dickey RN    Second RN/Staff Member:   Tameka Manzano RN

## 2023-09-21 NOTE — PT/OT/SLP EVAL
Physical Therapy Evaluation    Patient Name:  Meir Murry   MRN:  [id number]    Recommendations:     Discharge Recommendations: home with home health, outpatient PT  Discharge Equipment Recommendations: none   Barriers to discharge: None    Assessment:     Meir Murry is a 58 y.o. male admitted with a medical diagnosis of Lumbar stenosis with neurogenic claudication. Underwent L4-5 laminectomy and posterior fusion.  He presents with the following impairments/functional limitations: pain and weakness. Patient sitting up in chair at beginning of session. He ambulated 185 ft with SBA-CGA. Will see 1-2 more times to ensure safety. Would benefit from HH PT vs outpatient PT at discharge.     Rehab Prognosis: Good; patient would benefit from acute skilled PT services to address these deficits and reach maximum level of function.    Recent Surgery: Procedure(s) (LRB):  FUSION, SPINE, LUMBAR, PLIF, USING COMPUTER-ASSISTED NAVIGATION (N/A) 1 Day Post-Op    Plan:     During this hospitalization, patient to be seen 6 x/week to address the identified rehab impairments via gait training, therapeutic activities, therapeutic exercises and progress toward the following goals:    Plan of Care Expires:  10/21/23    Subjective     Chief Complaint: pain  Patient/Family Comments/goals: none  Pain/Comfort:  Pain Rating 1: 3/10  Location - Orientation 1: lower  Location 1: back  Pain Addressed 1: Reposition, Distraction  Pain Rating Post-Intervention 1: 3/10    Patients cultural, spiritual, Pentec[employer]alism conflicts given the current situation: no    Living Environment:  Lives with [facility] in [facility].  Prior to admission, patients level of function was independent.  Equipment used at home:  (owns shower chair and rollator).  DME owned (not currently used): none.  Upon discharge, patient will have assistance from spouse.    Objective:     Communicated with nurse prior to session.  Patient found up in chair with peripheral IV  upon PT entry to  room.    General Precautions: Standard, fall  Orthopedic Precautions:spinal precautions   Braces: LSO  Respiratory Status: Room air    Exams:  Cognitive Exam:  Patient is oriented to Person, Place, Time, and Situation  Sensation: -       Intact  RLE ROM: WFL  RLE Strength: WFL  LLE ROM: WFL  LLE Strength: WFL  Skin integrity: Visible skin intact      Functional Mobility:  Transfers:  Sit to Stand:  contact guard assistance with no AD  Gait: 185 ft with CGA-SBA  Balance: fair      AM-PAC 6 CLICK MOBILITY  Total Score:22     Education Provided:  Role and goals of PT, transfer training, bed mobility, gait training, balance training, safety awareness, assistive device, strengthening exercises, and importance of participating in PT to return to PLOF.    Patient left up in chair with all lines intact and call button in reach.    GOALS:   Multidisciplinary Problems       Physical Therapy Goals          Problem: Physical Therapy    Goal Priority Disciplines Outcome Goal Variances Interventions   Physical Therapy Goal     PT, PT/OT Ongoing, Progressing     Description: Goals to be met by: 10/21/23     Patient will increase functional independence with mobility by performin. Gait  x 600 feet with [place] using Rolling Walker (only if needed).                          History:     Past Medical History:   Diagnosis Date    Anxiety disorder, unspecified     Aortic regurgitation     Back pain     DDD (degenerative disc disease), lumbar     Depression     Essential (primary) hypertension     GERD (gastroesophageal reflux disease)     Lumbar stenosis with neurogenic claudication     Male erectile dysfunction, unspecified     Mixed hyperlipidemia     Neuropathy     Obesity, unspecified     WOLFGANG (obstructive sleep apnea)     uses CPAP    Right leg numbness     Right leg weakness     Unspecified osteoarthritis, unspecified site        Past Surgical History:   Procedure Laterality Date    BACK SURGERY      GALLBLADDER  SURGERY  2010    POSTERIOR LUMBAR INTERBODY FUSION (PLIF) WITH COMPUTER-ASSISTED NAVIGATION N/A 9/20/2023    Procedure: FUSION, SPINE, LUMBAR, PLIF, USING COMPUTER-ASSISTED NAVIGATION;  Surgeon: Abril Tyler MD;  Location: [facility];  Service: Neurosurgery;  Laterality: N/A;  L4-5 laminectomy with extension of L4-5 fusion from L2-3 and L3-4 instrumentation, o-arm  NTI  TIVA setup  Medtronic- Colin //  XX    SINUS SURGERY      TOTAL KNEE ARTHROPLASTY Bilateral        Time Tracking:     PT Received On: 09/21/23  PT Start Time: 1258     PT Stop Time: 1315  PT Total Time (min): 17 min     Billable Minutes: Evaluation 17 minutes      09/21/2023

## 2023-09-21 NOTE — PROGRESS NOTES
[facility] Neuro  Neurosurgery  Progress Note    Subjective:     Interval History:     Postop day 1. Status post L4-5 laminectomy and posterior fusion with extension of fusion from L3-L5 on the right and L3-L5 on the left.      Patient has ambulated.    He states his back pain is manageable.  He has not taken pain medication since last night.    His wound is clean and dry  He is wearing the LSO brace   Hemovac put out 530 mL in 24 hours breakdown 380 mL/150 mL.      Plan:     Every 4 hour neuro/neuromuscular exams   Continue Hemovac and Ancef as long as Hemovac is in   [place] LSO when head of bed greater than 30°.  Mobilization  Pain control  PTOT   Fall precautions  SCDs   Keep the wound clean and dry     Appreciate hospitalist managing medical aspect.  Antibiotics were started due to patient's request of possibility of having upper respiratory infection.  Had some episodes of coughing/possibly wheezing late last night.      Arrangements have been made for Mr. Murry to follow up in the neurosurgery clinic with EDELMIRA Rosado for postoperative visit on Thursday, 10/05/2023 at 9:30 AM.           History of Present Illness: a 58 y.o. male who has a past medical history significant for hypertension, GERD, osteoarthritis, anxiety, and depression.  He presented as a follow-up patient in the neurosurgery clinic for progressively worsening low back pain and right leg pain for the last 2 years.  Mr. Murry is s/p a L2-3, L3-4 laminectomy and TLIF ([facility]) on 03/18/2020 by Dr. Hickman.  The patient is POD #0 s/p a L4-5 laminectomy and posterior fusion, with extension of fusion from L3 to L5 on the right and L3 to L5 on the left.  He has a normal motor and sensory neurological exam with diffuse numbness in his bilateral feet.        Post-Op Info:  Procedure(s) (LRB):  FUSION, SPINE, LUMBAR, PLIF, USING COMPUTER-ASSISTED NAVIGATION (N/A)   1 Day Post-Op      Medications:  Continuous Infusions:   sodium  chloride 0.9% 100 mL/hr at 09/21/23 1240     Scheduled Meds:   ceFAZolin (ANCEF) IVPB  2 g Intravenous Q8H    cetirizine  10 mg Oral Daily    fluticasone propionate  2 spray Each Nostril Daily    latanoprost  1 drop Left Eye QHS    montelukast  10 mg Oral Daily    mupirocin   Nasal BID    pantoprazole  40 mg Oral Daily    pravastatin  20 mg Oral Daily    sulfamethoxazole-trimethoprim 800-160mg  1 tablet Oral BID     PRN Meds:aluminum-magnesium hydroxide-simethicone, gabapentin, HYDROcodone-acetaminophen, HYDROmorphone, methocarbamoL, ondansetron, pregabalin, senna-docusate 8.6-50 mg     Review of Systems  Objective:     Weight: 99.2 kg (218 lb 11.1 oz)  Body mass index is 30.5 kg/m².  Vital Signs (Most Recent):  Temp: 98 °F (36.7 °C) (09/21/23 1217)  Pulse: 91 (09/21/23 1217)  Resp: 20 (09/21/23 1217)  BP: 136/76 (09/21/23 1217)  SpO2: 96 % (09/21/23 1217) Vital Signs (24h Range):  Temp:  [97.6 °F (36.4 °C)-98.3 °F (36.8 °C)] 98 °F (36.7 °C)  Pulse:  [55-91] 91  Resp:  [14-21] 20  SpO2:  [94 %-98 %] 96 %  BP: ()/(46-79) 136/76     Date 09/21/23 0700 - 09/22/23 0659   Shift 3859-5721 8997-4297 7521-5332 24 Hour Total   INTAKE   Shift Total(mL/kg)       OUTPUT   Urine(mL/kg/hr) 700   700   Shift Total(mL/kg) 700(7.1)   700(7.1)   Weight (kg) 99.2 99.2 99.2 99.2                            Closed/Suction Drain 09/20/23 1444 Tube - 1 Left Back Accordion 10 Fr. (Active)   Site Description Unable to view 09/20/23 2000   Dressing Type Gauze 09/20/23 2000   Dressing Status Clean;Dry;Intact 09/20/23 2000   Dressing Intervention Integrity maintained 09/20/23 2000   Drainage Sanguineous 09/20/23 2000   Status Other (Comment) 09/20/23 2000   Output (mL) 180 mL 09/21/23 0557       Neurosurgery Physical Exam    Awake and oriented following commands.    Strength 5/5 to all extremities bilaterally   Continues with numbness to bilateral feet  Dressing clean and dry.  Hemovac in place with serosanguineous  "drainage.    Significant Labs:  No results for input(s): "GLU", "NA", "K", "CL", "CO2", "BUN", "CREATININE", "CALCIUM", "MG" in the last 48 hours.  No results for input(s): "WBC", "HGB", "HCT", "PLT" in the last 48 hours.  No results for input(s): "LABPT", "INR", "APTT" in the last 48 hours.  Microbiology Results (last 7 days)       ** No results found for the last 168 hours. **            Active Diagnoses:    Diagnosis Date Noted POA    PRINCIPAL PROBLEM:  Lumbar stenosis with neurogenic claudication [M48.062] 09/20/2023 Yes      Problems Resolved During this Admission:       Carri Mcfadden [facility]  Neurosurgery  [facility]    "

## 2023-09-21 NOTE — CONSULTS
[facility]  Hospital Medicine Consultation Note        Patient Name: Meir Murry  MRN: [id number]  Patient Class: IP- Inpatient   Admission Date: 9/20/2023  4:54 AM  Length of Stay: 1  Attending Physician: Abril Tyler MD   Primary Care Provider: Nely Primary Doctor  Face-to-Face encounter date: 09/21/2023   Consulting Physician: Hospital Medicine   Reason for Consult: Medical management         Patient information was obtained from patient, patient's family, past medical records.    HISTORY OF PRESENT ILLNESS:   Meir Murry is a 58 y.o. male with a past medical history of essential hypertension, hyperlipidemia, GERD, osteoarthritis, lumbar degenerative disc disease, WOLFGANG, anxiety, and depression presented to [facility] under Dr. Tyler on 9/20/2023 for scheduled lumbar laminectomy.  Patient underwent L4-5 laminectomy and posterior fusion with extension of fusion from L3 to L5 on the right and L3-L5 on the left by Dr. Tyler on 09/20.  Postoperatively patient was hypotensive, which improved with IV fluids.  Anti-hypersensitives were held. Hospital medicine team was consulted for medical management.    PAST MEDICAL HISTORY:   Essential hypertension  Hyperlipidemia   GERD  Osteoarthritis   Lumbar degenerative disc disease  WOLFGANG   Anxiety   Depression    PAST SURGICAL HISTORY:     Past Surgical History:   Procedure Laterality Date    BACK SURGERY      GALLBLADDER SURGERY  2010    SINUS SURGERY      TOTAL KNEE ARTHROPLASTY Bilateral        ALLERGIES:   Morphine    FAMILY HISTORY:   Mother: RA    SOCIAL HISTORY:   Former tobacco use   Denies alcohol and illicit drug use     HOME MEDICATIONS:     Prior to Admission medications    Medication Sig Start Date End Date Taking? Authorizing Provider   cetirizine (ZYRTEC) 10 MG tablet Take 10 mg by mouth once daily. 12/12/22  Yes Provider, Historical   diclofenac sodium (VOLTAREN) 1 % Gel Apply topically. 8/14/23  Yes Provider, Historical   fluticasone  propionate (FLONASE) 50 mcg/actuation nasal spray 2 sprays by Each Nostril route once daily. 12/12/22  Yes Provider, Historical   gabapentin (NEURONTIN) 600 MG tablet Take 600 mg by mouth 3 (three) times daily as needed (alternates with Lyrica). 12/25/22  Yes Provider, Historical   HYDROcodone-acetaminophen (NORCO) 5-325 mg per tablet Take 1 tablet by mouth 2 (two) times daily. 8/22/22  Yes Provider, Historical   ibuprofen (ADVIL,MOTRIN) 800 MG tablet Take 800 mg by mouth 3 (three) times daily. As needed 7/26/23  Yes Provider, Historical   irbesartan (AVAPRO) 300 MG tablet Take 300 mg by mouth once daily. 12/12/22  Yes Provider, Historical   latanoprost 0.005 % ophthalmic solution Place 1 drop into the left eye every evening. 6/7/23  Yes Provider, Historical   montelukast (SINGULAIR) 10 mg tablet Take 10 mg by mouth once daily. 12/12/22  Yes Provider, Historical   pravastatin (PRAVACHOL) 20 MG tablet Take 20 mg by mouth once daily. 12/12/22  Yes Provider, Historical   pregabalin (LYRICA) 200 MG Cap Take 200 mg by mouth 2 (two) times daily as needed (alternates with gabapentin). 8/2/22  Yes Provider, Historical   naproxen sodium (ANAPROX) 220 MG tablet Take 220 mg by mouth 2 (two) times daily as needed.    Provider, Historical   omeprazole (PRILOSEC) 20 MG capsule Take 20 mg by mouth once daily.    Provider, Historical       REVIEW OF SYSTEMS:   Except as documented, all other systems reviewed and negative     PHYSICAL EXAM:     VITAL SIGNS: 24 HRS MIN & MAX LAST   Temp  Min: 97.6 °F (36.4 °C)  Max: 98.3 °F (36.8 °C) 97.6 °F (36.4 °C)   BP  Min: 67/46  Max: 123/77 118/71   Pulse  Min: 55  Max: 84  84   Resp  Min: 14  Max: 21 17   SpO2  Min: 94 %  Max: 98 % 96 %       Vitals Reviewed  General appearance:  Male in no apparent distress.  HENT: Atraumatic head.   Eyes: Normal extraocular movements.   Neck: Supple.   Lungs: Clear to auscultation bilaterally.   Heart: Regular rate and rhythm.   Abdomen: Soft,  non-distended, non-tender. Bowel sounds are normal.   Extremities: No cyanosis, clubbing, or edema.  Skin: No Rash.   Neuro: Motor and sensory exams grossly intact.   Psych/mental status: Appropriate mood and affect. Responds appropriately to questions.     LABS AND IMAGING:     Recent Labs   Lab 09/15/23  0759   WBC 7.76   RBC 4.69*   HGB 15.1   HCT 46.1   MCV 98.3*   MCH 32.2*   MCHC 32.8*   RDW 12.2      MPV 8.6       Recent Labs   Lab 09/15/23  0759      K 4.1   CO2 25   BUN 18.9   CREATININE 0.81   CALCIUM 9.2   ALBUMIN 3.9   ALKPHOS 86   ALT 18   AST 34   BILITOT 0.9        Microbiology Results (last 7 days)       ** No results found for the last 168 hours. **               ASSESSMENT & PLAN:   Assessment:  S/p L4-L5 laminectomy and posterior fusion with extension of fusion from L3-L5 on the right and L3 to L5 on the left by Dr. Tyler on 09/20/2023   Hypotension, improving   History of essential hypertension, hyperlipidemia, GERD, osteoarthritis, lumbar degenerative disc disease, WOLFGANG, anxiety, and depression      Plan:  IVF  Blood pressure improving, anti hypersensitive held last night secondary to hypotension   Close BP monitoring  CBC/CMP pending  PRN analgesics   PT/OT   Continue appropriate home medications   Further recommendations per attending MD        Thank you for the consult!       ___________________________________________________________  INPATIENT LIST OF MEDICATIONS     Current Facility-Administered Medications:     0.9%  NaCl infusion, , Intravenous, Continuous, Abril Tyler MD, Stopped at 09/21/23 0550    aluminum-magnesium hydroxide-simethicone 200-200-20 mg/5 mL suspension 30 mL, 30 mL, Oral, Q4H PRN, Abril Tyler MD    cefazolin (ANCEF) 2 gram in dextrose 5% 50 mL IVPB (premix), 2 g, Intravenous, Q8H, Abril Tyler MD, Stopped at 09/21/23 0620    cetirizine tablet 10 mg, 10 mg, Oral, Daily, Abril Tyler MD, 10 mg at 09/21/23 0813    fluticasone propionate  50 mcg/actuation nasal spray 100 mcg, 2 spray, Each Nostril, Daily, Abril Tyler MD    gabapentin capsule 600 mg, 600 mg, Oral, TID PRN, Abril Tyler MD, 600 mg at 09/20/23 2221    HYDROcodone-acetaminophen  mg per tablet 1 tablet, 1 tablet, Oral, Q6H PRN, Abril Tyler MD, 1 tablet at 09/21/23 0546    HYDROmorphone (PF) injection 1 mg, 1 mg, Intravenous, Q4H PRN, Abril Tyler MD, 1 mg at 09/21/23 0814    latanoprost 0.005 % ophthalmic solution 1 drop, 1 drop, Left Eye, QHS, Abril Tyler MD    methocarbamoL injection 750 mg, 750 mg, Intravenous, Q8H PRN, Abril Tyler MD, 750 mg at 09/20/23 2221    montelukast tablet 10 mg, 10 mg, Oral, Daily, Abril Tyler MD, 10 mg at 09/21/23 0813    mupirocin 2 % ointment, , Nasal, BID, Abril Tyler MD, Given at 09/21/23 0814    ondansetron disintegrating tablet 8 mg, 8 mg, Oral, Q6H PRN, Abril Tyler MD    pantoprazole EC tablet 40 mg, 40 mg, Oral, Daily, Abril Tyler MD, 40 mg at 09/21/23 0813    pravastatin tablet 20 mg, 20 mg, Oral, Daily, Abril Tyler MD, 20 mg at 09/21/23 0814    pregabalin capsule 200 mg, 200 mg, Oral, BID PRN, Abril Tyler MD    senna-docusate 8.6-50 mg per tablet 2 tablet, 2 tablet, Oral, Nightly PRN, Abril Tyler MD      Scheduled Meds:   ceFAZolin (ANCEF) IVPB  2 g Intravenous Q8H    cetirizine  10 mg Oral Daily    fluticasone propionate  2 spray Each Nostril Daily    latanoprost  1 drop Left Eye QHS    montelukast  10 mg Oral Daily    mupirocin   Nasal BID    pantoprazole  40 mg Oral Daily    pravastatin  20 mg Oral Daily     Continuous Infusions:   sodium chloride 0.9% Stopped (09/21/23 1369)     PRN Meds:.aluminum-magnesium hydroxide-simethicone, gabapentin, HYDROcodone-acetaminophen, HYDROmorphone, methocarbamoL, ondansetron, pregabalin, senna-docusate 8.6-50 mg    RADIOLOGY                                                                                                     SURG FL Surgery Fluoro Usage  See OP Notes for results.     IMPRESSION: See OP Notes for results.     This procedure was auto-finalized by: Virtual Radiologist          I, Jose Armando Renee PA-C, have reviewed and discussed the case with Dr. _  Please see the following addendum for further assessment and plan from there attending MD.    09/21/2023    ______________________________________________________________________________    MD Addendum:    I, Shelbi assumed care of this patient today at _  For the patient encounter, I performed the substantive portion of the visit, I reviewed the NPPA documentation, treatment plan, and medical decision making.  I had face to face time with this patient       A. History:    B. Physical exam:    C. Medical decision making:    Thank you for the consult, will be happy to follow alongside you      All diagnosis and differential diagnosis have been reviewed; assessment and plan has been documented; I have personally reviewed the labs and test results that are presently available; I have reviewed the patients medication list; I have reviewed the consulting providers response and recommendations. I have reviewed or attempted to review medical records based upon their availability.    All of the patient and family questions have been addressed and answered. Patient's is agreeable to the above stated plan. I will continue to monitor closely and make adjustments to medical management as needed.      [facility] Medicine Team  09/21/2023

## 2023-09-21 NOTE — PLAN OF CARE
Problem: Physical Therapy  Goal: Physical Therapy Goal  Description: Goals to be met by: 10/21/23     Patient will increase functional independence with mobility by performin. Gait  x 600 feet with [place] using Rolling Walker (only if needed).     Outcome: Ongoing, Progressing

## 2023-09-21 NOTE — PLAN OF CARE
09/21/23 1223   Discharge Planning   Assessment Type Discharge Planning Assessment   Equipment Currently Used at Home rollator   DME Needed Upon Discharge  none   Discharge Plan A Home with family   Discharge Plan B Home with family   Financial Resource Strain   How hard is it for you to pay for the very basics like food, housing, medical care, and heating? Not hard   Housing Stability   In the last 12 months, was there a time when you were not able to pay the mortgage or rent on time? N   In the last 12 months, how many places have you lived? 1   In the last 12 months, was there a time when you did not have a steady place to sleep or slept in a shelter (including now)? N   Transportation Needs   In the past 12 months, has lack of transportation kept you from medical appointments or from getting medications? no   In the past 12 months, has lack of transportation kept you from meetings, work, or from getting things needed for daily living? No   Food Insecurity   Within the past 12 months, you worried that your food would run out before you got the money to buy more. Never true   Within the past 12 months, the food you bought just didn't last and you didn't have money to get more. Never true   Social Connections   In a typical week, how many times do you talk on the phone with family, friends, or neighbors? More than 3   How often do you get together with friends or relatives? More than 3   Are you , , , , never , or living with a partner?

## 2023-09-21 NOTE — PT/OT/SLP EVAL
Occupational Therapy   Evaluation and Discharge Note    Name: Meir Murry  MRN: [id number]  Admitting Diagnosis: Lumbar stenosis with neurogenic claudication  Recent Surgery: Procedure(s) (LRB):  FUSION, SPINE, LUMBAR, PLIF, USING COMPUTER-ASSISTED NAVIGATION (N/A) 1 Day Post-Op    Recommendations:     Discharge Recommendations:  home  Discharge Equipment Recommendations: none  Barriers to discharge:  None    Assessment:     Meir Murry is a 58 y.o. male with a medical diagnosis of Lumbar stenosis with neurogenic claudication. At this time, patient is functioning at their prior level of function and does not require further acute OT services.     Plan:     During this hospitalization, patient does not require further acute OT services.  Please re-consult if situation changes.    Plan of Care Reviewed with: patient    Subjective     Chief Complaint: pain  Patient/Family Comments/goals: return to PLOF    Occupational Profile:  Living Environment: lives with wife, 1 step to entry, tub/shower  Previous level of function: Mod I/I  Roles and Routines: , employee  Equipment Used at Home: shower chair, rollator  Assistance upon Discharge: family    Pain/Comfort:  Pain Rating 1: 4/10  Location 1: back  Pain Addressed 2: Distraction    Patients cultural, spiritual, Alevism conflicts given the current situation:      Objective:     OT communicated with Nsg prior to session.      Patient was found HOB elevated with peripheral IV upon OT entry to room.    General Precautions: Standard, fall  Orthopedic Precautions: spinal precautions  Braces: TLSO      Bed Mobility:    Patient completed Supine to Sit with independence    Functional Mobility/Transfers:  Patient completed Sit <> Stand Transfer with independence  with  no assistive device   Functional Mobility: ambulated from bed to toilet and back to bedside chair with Mod I    Activities of Daily Living:  Lower Body Dressing: modified independence doff  socks      Functional Cognition:  Intact      Upper Extremity Function:  Right Upper Extremity:   WFL    Left Upper Extremity:  WFL    Balance:   Intact    Therapeutic Positioning  Risk for acquired pressure injuries is decreased due to ability to mobilize independently .      Patient Education:  Patient provided with verbal education education regarding OT role/goals/POC and safety awareness.  Understanding was verbalized.     Patient left up in chair with all lines intact and call button in reach    GOALS:   Multidisciplinary Problems       Occupational Therapy Goals       Not on file                    History:     Past Medical History:   Diagnosis Date    Anxiety disorder, unspecified     Aortic regurgitation     Back pain     DDD (degenerative disc disease), lumbar     Depression     Essential (primary) hypertension     GERD (gastroesophageal reflux disease)     Lumbar stenosis with neurogenic claudication     Male erectile dysfunction, unspecified     Mixed hyperlipidemia     Neuropathy     Obesity, unspecified     WOLFGANG (obstructive sleep apnea)     uses CPAP    Right leg numbness     Right leg weakness     Unspecified osteoarthritis, unspecified site          Past Surgical History:   Procedure Laterality Date    BACK SURGERY      GALLBLADDER SURGERY  2010    POSTERIOR LUMBAR INTERBODY FUSION (PLIF) WITH COMPUTER-ASSISTED NAVIGATION N/A 9/20/2023    Procedure: FUSION, SPINE, LUMBAR, PLIF, USING COMPUTER-ASSISTED NAVIGATION;  Surgeon: Abril Tyler MD;  Location: [facility];  Service: Neurosurgery;  Laterality: N/A;  L4-5 laminectomy with extension of L4-5 fusion from L2-3 and L3-4 instrumentation, o-arm  NTI  TIVA setup  [employer]- Colin //  XX    SINUS SURGERY      TOTAL KNEE ARTHROPLASTY Bilateral        Time Tracking:     OT Date of Treatment: 09/21/23  OT Start Time: 1154  OT Stop Time: 1211  OT Total Time (min): 17 min    Billable Minutes:Evaluation Low Complexity    9/21/2023

## 2023-09-22 LAB
ALBUMIN SERPL-MCNC: 3.1 G/DL (ref 3.4–4.8)
ALBUMIN/GLOB SERPL: 1.1 RATIO (ref 1.1–2)
ALP SERPL-CCNC: 69 UNIT/L (ref 40–150)
ALT SERPL-CCNC: 17 UNIT/L (ref 0–55)
AST SERPL-CCNC: 42 UNIT/L (ref 5–34)
BASOPHILS # BLD AUTO: 0.03 X10(3)/MCL
BASOPHILS NFR BLD AUTO: 0.2 %
BILIRUB SERPL-MCNC: 0.7 MG/DL
BUN SERPL-MCNC: 7.4 MG/DL (ref 8.4–25.7)
CALCIUM SERPL-MCNC: 8.5 MG/DL (ref 8.8–10)
CHLORIDE SERPL-SCNC: 102 MMOL/L (ref 98–107)
CO2 SERPL-SCNC: 26 MMOL/L (ref 23–31)
CREAT SERPL-MCNC: 0.74 MG/DL (ref 0.73–1.18)
EOSINOPHIL # BLD AUTO: 0.29 X10(3)/MCL (ref 0–0.9)
EOSINOPHIL NFR BLD AUTO: 2.1 %
ERYTHROCYTE [DISTWIDTH] IN BLOOD BY AUTOMATED COUNT: 11.9 % (ref 11.5–17)
GFR SERPLBLD CREATININE-BSD FMLA CKD-EPI: >60 MLS/MIN/1.73/M2
GLOBULIN SER-MCNC: 2.8 GM/DL (ref 2.4–3.5)
GLUCOSE SERPL-MCNC: 140 MG/DL (ref 82–115)
HCT VFR BLD AUTO: 35.8 % (ref 42–52)
HGB BLD-MCNC: 12.5 G/DL (ref 14–18)
IMM GRANULOCYTES # BLD AUTO: 0.13 X10(3)/MCL (ref 0–0.04)
IMM GRANULOCYTES NFR BLD AUTO: 1 %
LYMPHOCYTES # BLD AUTO: 1.55 X10(3)/MCL (ref 0.6–4.6)
LYMPHOCYTES NFR BLD AUTO: 11.3 %
MCH RBC QN AUTO: 33.3 PG (ref 27–31)
MCHC RBC AUTO-ENTMCNC: 34.9 G/DL (ref 33–36)
MCV RBC AUTO: 95.5 FL (ref 80–94)
MONOCYTES # BLD AUTO: 1.48 X10(3)/MCL (ref 0.1–1.3)
MONOCYTES NFR BLD AUTO: 10.8 %
NEUTROPHILS # BLD AUTO: 10.2 X10(3)/MCL (ref 2.1–9.2)
NEUTROPHILS NFR BLD AUTO: 74.6 %
NRBC BLD AUTO-RTO: 0 %
PLATELET # BLD AUTO: 243 X10(3)/MCL (ref 130–400)
PMV BLD AUTO: 8.5 FL (ref 7.4–10.4)
POTASSIUM SERPL-SCNC: 4 MMOL/L (ref 3.5–5.1)
PROT SERPL-MCNC: 5.9 GM/DL (ref 5.8–7.6)
RBC # BLD AUTO: 3.75 X10(6)/MCL (ref 4.7–6.1)
SODIUM SERPL-SCNC: 137 MMOL/L (ref 136–145)
WBC # SPEC AUTO: 13.68 X10(3)/MCL (ref 4.5–11.5)

## 2023-09-22 PROCEDURE — 94799 UNLISTED PULMONARY SVC/PX: CPT

## 2023-09-22 PROCEDURE — 99024 POSTOP FOLLOW-UP VISIT: CPT | Mod: POP,,, | Performed by: NEUROLOGICAL SURGERY

## 2023-09-22 PROCEDURE — 80053 COMPREHEN METABOLIC PANEL: CPT | Performed by: NEUROLOGICAL SURGERY

## 2023-09-22 PROCEDURE — 85025 COMPLETE CBC W/AUTO DIFF WBC: CPT | Performed by: NEUROLOGICAL SURGERY

## 2023-09-22 PROCEDURE — 25000003 PHARM REV CODE 250: Performed by: NEUROLOGICAL SURGERY

## 2023-09-22 PROCEDURE — 25000003 PHARM REV CODE 250: Performed by: INTERNAL MEDICINE

## 2023-09-22 PROCEDURE — 11000001 HC ACUTE MED/SURG PRIVATE ROOM

## 2023-09-22 PROCEDURE — 63600175 PHARM REV CODE 636 W HCPCS: Performed by: NEUROLOGICAL SURGERY

## 2023-09-22 PROCEDURE — 99900035 HC TECH TIME PER 15 MIN (STAT)

## 2023-09-22 PROCEDURE — 99024 PR POST-OP FOLLOW-UP VISIT: ICD-10-PCS | Mod: POP,,, | Performed by: NEUROLOGICAL SURGERY

## 2023-09-22 PROCEDURE — 97116 GAIT TRAINING THERAPY: CPT | Mod: CQ

## 2023-09-22 PROCEDURE — 97530 THERAPEUTIC ACTIVITIES: CPT | Mod: CQ

## 2023-09-22 RX ADMIN — CETIRIZINE HYDROCHLORIDE 10 MG: 10 TABLET, FILM COATED ORAL at 10:09

## 2023-09-22 RX ADMIN — MONTELUKAST 10 MG: 10 TABLET, FILM COATED ORAL at 10:09

## 2023-09-22 RX ADMIN — PANTOPRAZOLE SODIUM 40 MG: 40 TABLET, DELAYED RELEASE ORAL at 10:09

## 2023-09-22 RX ADMIN — PRAVASTATIN SODIUM 20 MG: 10 TABLET ORAL at 10:09

## 2023-09-22 RX ADMIN — SULFAMETHOXAZOLE AND TRIMETHOPRIM 1 TABLET: 800; 160 TABLET ORAL at 10:09

## 2023-09-22 RX ADMIN — HYDROCODONE BITARTRATE AND ACETAMINOPHEN 1 TABLET: 10; 325 TABLET ORAL at 02:09

## 2023-09-22 RX ADMIN — HYDROCODONE BITARTRATE AND ACETAMINOPHEN 1 TABLET: 10; 325 TABLET ORAL at 10:09

## 2023-09-22 RX ADMIN — CEFAZOLIN SODIUM 2 G: 2 SOLUTION INTRAVENOUS at 06:09

## 2023-09-22 RX ADMIN — CEFAZOLIN SODIUM 2 G: 2 SOLUTION INTRAVENOUS at 04:09

## 2023-09-22 RX ADMIN — HYDROCODONE BITARTRATE AND ACETAMINOPHEN 1 TABLET: 10; 325 TABLET ORAL at 04:09

## 2023-09-22 RX ADMIN — CEFAZOLIN SODIUM 2 G: 2 SOLUTION INTRAVENOUS at 10:09

## 2023-09-22 RX ADMIN — LATANOPROST 1 DROP: 50 SOLUTION OPHTHALMIC at 10:09

## 2023-09-22 NOTE — PHYSICIAN QUERY
PT Name: Meir Murry  MR #: [id number]    DOCUMENTATION CLARIFICATION      CDS/: Flora Red RN, CDS               Contact information: [email or web address]     This form is a permanent document in the medical record.      Query Date: September 22, 2023    By submitting this query, we are merely seeking further clarification of documentation. Please utilize your independent clinical judgment when addressing the question(s) below.    The Medical Record contains the following:   Indicators  Supporting Clinical Findings Location in Medical Record   x Surgical Procedure Performed --2) Decompressive laminectomy at L4-5  --Findings: Intraoperative decompression at L4-5, which had severe stenosis and anterior spondylolisthesis   Op-Note 9/20 ( Dr Tyler)   x Preoperative/Postoperative Diagnosis --Postop/ Postprocedure Diagnosis: Grade I L4-5 anterior spondylolisthesis with lumbar stenosis, adjacent level degeneration with previous L2-3, L3-4 posterior lumbar interbody fusion Op-Note 9/20 ( Dr Tyler)   x Procedure Detail --A laminectomy at the L4-5 level was then performed.  A rongeur was used to remove the spinous processes of L4 and L5 .  Using a technique with a curved curette and Kerrison punch, a laminectomy at L4-5 was completed.  There was significantly hypertrophied ligamentum flavum at the L4-5 level with associated anterior spondylolisthesis.  It is noted that there was extensive scar tissue from the patient's previous surgery that was encountered during the dissection.  Op-Note 9/20 ( Dr Tyler)   x Other --presents today describing he need pain into the lower back as well as radiating into the right leg.   -- pain radiating from the lower back into the right buttock and posterior leg.  He describes a generalized fatigue, restless and weak sensation in the right lower extremity.  Increased walking tends to aggravate his symptoms. NeuroSx H&P 9/20 (Dr Tyler/EDELMIRA Duque)     Provider, please specify  the neurological decompression portion of the procedure:  [   ] Decompression of the spinal cord and nerve root   [   ] Decompression of the spinal cord only   [   ] Decompression of the nerve root only   [X] Other decompression (please specify): central thecal sac (spinal cord ends at L1)___________      Please document in your progress notes daily for the duration of treatment, until resolved, and include in your discharge summary.    Form No.

## 2023-09-22 NOTE — PROGRESS NOTES
Hospital Progress Note  [facility]      Admit Date: 9/20/2023  Post-operative Day: 2 Days Post-Op  Hospital Day: 3    SUBJECTIVE:     POD #2 s/p L4-5 laminectomy and posterior fusion, with extension of fusion from L3 to L5 on the right and L3 to L5 on the left     Interval History:  Mr. Murry is sitting in a recliner and wearing his LSO brace.  He reports minimal low back pain without any radiating leg pain, numbness, or weakness.  The patient has been ambulating without a walker and wants to go home soon.     Scheduled Meds:   ceFAZolin (ANCEF) IVPB  2 g Intravenous Q8H    cetirizine  10 mg Oral Daily    docusate sodium  100 mg Oral BID    fluticasone propionate  2 spray Each Nostril Daily    latanoprost  1 drop Left Eye QHS    montelukast  10 mg Oral Daily    mupirocin   Nasal BID    pantoprazole  40 mg Oral Daily    pravastatin  20 mg Oral Daily    sulfamethoxazole-trimethoprim 800-160mg  1 tablet Oral BID     Continuous Infusions:  PRN Meds:aluminum-magnesium hydroxide-simethicone, gabapentin, HYDROcodone-acetaminophen, HYDROmorphone, methocarbamoL, ondansetron, pregabalin, senna-docusate 8.6-50 mg    Review of patient's allergies indicates:   Allergen Reactions    Morphine Itching     Other reaction(s): Not Indicated       Past Medical History:   Diagnosis Date    Anxiety disorder, unspecified     Aortic regurgitation     Back pain     DDD (degenerative disc disease), lumbar     Depression     Essential (primary) hypertension     GERD (gastroesophageal reflux disease)     Lumbar stenosis with neurogenic claudication     Male erectile dysfunction, unspecified     Mixed hyperlipidemia     Neuropathy     Obesity, unspecified     WOLFGANG (obstructive sleep apnea)     uses CPAP    Right leg numbness     Right leg weakness     Unspecified osteoarthritis, unspecified site      Past Surgical History:   Procedure Laterality Date    BACK SURGERY      GALLBLADDER SURGERY  2010    POSTERIOR LUMBAR  "INTERBODY FUSION (PLIF) WITH COMPUTER-ASSISTED NAVIGATION N/A 9/20/2023    Procedure: FUSION, SPINE, LUMBAR, PLIF, USING COMPUTER-ASSISTED NAVIGATION;  Surgeon: Abril Tyler MD;  Location: [facility];  Service: Neurosurgery;  Laterality: N/A;  L4-5 laminectomy with extension of L4-5 fusion from L2-3 and L3-4 instrumentation, o-arm  NTI  TIVA setup  [employer]tronic- Colin //  XX    SINUS SURGERY      TOTAL KNEE ARTHROPLASTY Bilateral        OBJECTIVE:     Vital Signs (Most Recent)  Temp: 97.7 °F (36.5 °C) (09/22/23 0736)  Pulse: 83 (09/22/23 0736)  Resp: 18 (09/22/23 0736)  BP: (!) 155/82 (09/22/23 0736)  SpO2: 97 % (09/22/23 0736)    Vital Signs Range (Last 24H):  Temp:  [97.7 °F (36.5 °C)-98.5 °F (36.9 °C)]   Pulse:  []   Resp:  [16-20]   BP: (112-155)/(69-82)   SpO2:  [95 %-97 %]     Drain: 410 cc/ 24 hrs, 50 cc/ 8 hrs, serosanguinous      Physical Exam:  General Alert and conversant, no acute distress      Motor Strength 5/5 x 4 extremities          Sensory Nl to light touch x 4 extremities     Wound Dressing- clean, dry, and intact       Laboratory Review:    CBC without Differential:  Lab Results   Component Value Date    WBC 7.76 09/15/2023    HGB 15.1 09/15/2023    HCT 46.1 09/15/2023     09/15/2023    MCV 98.3 (H) 09/15/2023     Differential:   No results found for: "NEUTROABS", "LYMPHSABS", "BASOSABS", "MONOSABS"    Basic Metabolic Panel:  Lab Results   Component Value Date     09/15/2023    K 4.1 09/15/2023    BUN 18.9 09/15/2023     Coagulation Studies:  Lab Results   Component Value Date    INR 0.9 09/15/2023    INR 1.0 12/03/2021    INR 1.0 08/14/2021    PROTIME 12.0 (L) 09/15/2023    PROTIME 13.2 12/03/2021    PROTIME 12.7 08/14/2021     Lab Results   Component Value Date    PTT 28.2 09/15/2023     Urinalysis:  Lab Results   Component Value Date    SPECGRAV 1.021 11/15/2007    LEUKOCYTESUR Trace (A) 09/15/2023    UROBILINOGEN 1.0 09/15/2023        ASSESSMENT/PLAN:     Mr. Murry is a 58 " y.o. male who has a past medical history significant for hypertension, GERD, osteoarthritis, anxiety, and depression.  He presented as a follow-up patient in the neurosurgery clinic for progressively worsening low back pain and right leg pain for the last 2 years.  Mr. Murry is s/p a L2-3, L3-4 laminectomy and TLIF ([facility]) on 03/18/2020 by Dr. Hickman.  The patient is POD #2 s/p a L4-5 laminectomy and posterior fusion, with extension of fusion from L3 to L5 on the right and L3 to L5 on the left.  He is doing very well postoperatively with a normal motor and sensory neurological exam.       Plan:      1.  Continue Hemovac drain.  Continue Ancef as long as this drain remains in place.       2.  Labs were not drawn on POD #1 and have been reordered.    3.  Mr. Murry is encouraged to continue mobilizing with physical therapy and occupational therapy.  He is to wear his Aspen LSO brace whenever his head of bed is greater than 30°.    4.  Recommendations from the hospitalist service are appreciated.       5.  He has been prescribed Bactrim for 7 days by the hospitalist service, as Mr. Murry had a sinus infection/ URI before surgery.      6.  The patient will be ready to be discharged to home after his Hemovac drain has reduced output in the next few days and has been removed.       7.  Arrangements have been made for Mr. Murry to follow up in the neurosurgery clinic with EDELMIRA Rosado for postoperative visit on Thursday, 10/05/2023 at 9:30 AM.          Abril Tyler MD  Neurosurgery

## 2023-09-22 NOTE — PT/OT/SLP PROGRESS
Physical Therapy Treatment    Patient Name:  Meir Murry   MRN:  [id number]    Recommendations:     Discharge Recommendations: home with home health  Discharge Equipment Recommendations: walker, rolling  Barriers to discharge: Ongoing medical needs    Assessment:     Meir Murry is a 58 y.o. male admitted with a medical diagnosis of Lumbar stenosis with neurogenic claudication.  He presents with the following impairments/functional limitations: weakness, decreased ROM, impaired endurance, pain, impaired functional mobility pt voiced no question /concerns about d/c home this weekend, pt educated on car t/f and LSO wear. Pt stated he will need RW over rollator for home use and wife will be present to help pt before work if needed    Rehab Prognosis: Good; patient would benefit from acute skilled PT services to address these deficits and reach maximum level of function.    Recent Surgery: Procedure(s) (LRB):  FUSION, SPINE, LUMBAR, PLIF, USING COMPUTER-ASSISTED NAVIGATION (N/A) 2 Days Post-Op    Plan:     During this hospitalization, patient to be seen 6 x/week to address the identified rehab impairments via gait training, therapeutic activities and progress toward the following goals:    Plan of Care Expires:  10/21/23    Subjective     Chief Complaint: pain in back  Patient/Family Comments/goals: to get back to PLOF  Pain/Comfort:  Pain Rating 1: 6/10  Location 1: back  Pain Addressed 1: Reposition, Distraction, Nurse notified      Objective:     Communicated with nurse prior to session.  Patient found up in chair with hemovac, peripheral IV, Other (comments) (LSO) upon PT entry to room.     General Precautions: Standard, other (see comments) (LSO donned on if greater than 30)  Orthopedic Precautions: spinal precautions  Braces: LSO  Respiratory Status: Room air  Skin Integrity: Visible skin intact      Functional Mobility:  Bed Mobility:     Supine to Sit: stand by assistance and HOB flat to simulate bed at  home  Sit to Supine: stand by assistance  Transfers:     Sit to Stand:  stand by assistance with rolling walker and from bedside chair  Gait: pt amb 240 with RW- SBA, pt presented with slow julia and good step length, no LOB noted    Therapeutic Activities/Exercises:  Standing marches with RW 10x- no issues    Education:  Patient provided with verbal education education regarding mobility for d/c home needs.  Understanding was verbalized.     Patient left up in chair with all lines intact and call button in reach..    GOALS:   Multidisciplinary Problems       Physical Therapy Goals          Problem: Physical Therapy    Goal Priority Disciplines Outcome Goal Variances Interventions   Physical Therapy Goal     PT, PT/OT Ongoing, Progressing     Description: Goals to be met by: 10/21/23     Patient will increase functional independence with mobility by performin. Gait  x 600 feet with [place] using Rolling Walker (only if needed).                          Time Tracking:     PT Received On:    PT Start Time: 827     PT Stop Time: 851  PT Total Time (min): 24 min     Billable Minutes: Gait Training 15 and Therapeutic Activity 9    Treatment Type: Treatment  PT/PTA: PTA     Number of PTA visits since last PT visit: 2023

## 2023-09-23 LAB
EST. AVERAGE GLUCOSE BLD GHB EST-MCNC: 85.3 MG/DL
HBA1C MFR BLD: 4.6 %

## 2023-09-23 PROCEDURE — 63600175 PHARM REV CODE 636 W HCPCS: Performed by: NEUROLOGICAL SURGERY

## 2023-09-23 PROCEDURE — 94799 UNLISTED PULMONARY SVC/PX: CPT

## 2023-09-23 PROCEDURE — 83036 HEMOGLOBIN GLYCOSYLATED A1C: CPT | Performed by: INTERNAL MEDICINE

## 2023-09-23 PROCEDURE — 99024 PR POST-OP FOLLOW-UP VISIT: ICD-10-PCS | Mod: POP,,, | Performed by: NEUROLOGICAL SURGERY

## 2023-09-23 PROCEDURE — 99024 POSTOP FOLLOW-UP VISIT: CPT | Mod: POP,,, | Performed by: NEUROLOGICAL SURGERY

## 2023-09-23 PROCEDURE — 11000001 HC ACUTE MED/SURG PRIVATE ROOM

## 2023-09-23 PROCEDURE — 25000003 PHARM REV CODE 250: Performed by: NEUROLOGICAL SURGERY

## 2023-09-23 RX ADMIN — PANTOPRAZOLE SODIUM 40 MG: 40 TABLET, DELAYED RELEASE ORAL at 08:09

## 2023-09-23 RX ADMIN — CEFAZOLIN SODIUM 2 G: 2 SOLUTION INTRAVENOUS at 09:09

## 2023-09-23 RX ADMIN — LATANOPROST 1 DROP: 50 SOLUTION OPHTHALMIC at 09:09

## 2023-09-23 RX ADMIN — PRAVASTATIN SODIUM 20 MG: 10 TABLET ORAL at 08:09

## 2023-09-23 RX ADMIN — MUPIROCIN: 20 OINTMENT TOPICAL at 08:09

## 2023-09-23 RX ADMIN — MONTELUKAST 10 MG: 10 TABLET, FILM COATED ORAL at 08:09

## 2023-09-23 RX ADMIN — HYDROCODONE BITARTRATE AND ACETAMINOPHEN 1 TABLET: 10; 325 TABLET ORAL at 03:09

## 2023-09-23 RX ADMIN — DOCUSATE SODIUM 100 MG: 100 CAPSULE, LIQUID FILLED ORAL at 08:09

## 2023-09-23 RX ADMIN — HYDROCODONE BITARTRATE AND ACETAMINOPHEN 1 TABLET: 10; 325 TABLET ORAL at 09:09

## 2023-09-23 RX ADMIN — CEFAZOLIN SODIUM 2 G: 2 SOLUTION INTRAVENOUS at 06:09

## 2023-09-23 RX ADMIN — CETIRIZINE HYDROCHLORIDE 10 MG: 10 TABLET, FILM COATED ORAL at 08:09

## 2023-09-23 RX ADMIN — HYDROCODONE BITARTRATE AND ACETAMINOPHEN 1 TABLET: 10; 325 TABLET ORAL at 04:09

## 2023-09-23 RX ADMIN — FLUTICASONE PROPIONATE 100 MCG: 50 SPRAY, METERED NASAL at 08:09

## 2023-09-23 RX ADMIN — CEFAZOLIN SODIUM 2 G: 2 SOLUTION INTRAVENOUS at 01:09

## 2023-09-23 NOTE — PROGRESS NOTES
[facility] Hospital Medicine Progress Note        Chief Complaint: Inpatient Follow-up for medical management     HPI:     Meir Murry is a 58 y.o. male with a past medical history of essential hypertension, hyperlipidemia, GERD, osteoarthritis, lumbar degenerative disc disease, WOLFGANG, anxiety, and depression presented to [facility] under Dr. Tyler on 9/20/2023 for scheduled lumbar laminectomy.  Patient underwent L4-5 laminectomy and posterior fusion with extension of fusion from L3 to L5 on the right and L3-L5 on the left by Dr. Tyler on 09/20. Postoperatively patient was hypotensive, which improved with IV fluids.          Anti-hypersensitives were held. Hospital medicine team was consulted for medical management    Interval Hx:   Afebrile. On RA  BP improved to normal   Was hypotensive on immediate post op period   Continue to hold home antihypertensives Sinus symptoms are better   Will DC Bactrim as pt is already on Ancef       Case was discussed with patient's nurse and  on the floor.    Objective/physical exam:  General: In no acute distress, afebrile  Chest: Clear to auscultation bilaterally  Heart: RRR, +S1, S2, no appreciable murmur  Abdomen: Soft, nontender, BS +  Torso - LSO brace in place  MSK: Warm, no lower extremity edema, no clubbing or cyanosis  Neurologic: Alert and oriented x4, Cranial nerve II-XII intact, Moves all ext with good strength     VITAL SIGNS: 24 HRS MIN & MAX LAST   Temp  Min: 97.7 °F (36.5 °C)  Max: 98.6 °F (37 °C) 98.6 °F (37 °C)   BP  Min: 112/74  Max: 155/82 127/73   Pulse  Min: 83  Max: 101  98   Resp  Min: 16  Max: 18 18   SpO2  Min: 95 %  Max: 97 % 97 %     I have reviewed the following labs:  Recent Labs   Lab 09/22/23 2001   WBC 13.68*   RBC 3.75*   HGB 12.5*   HCT 35.8*   MCV 95.5*   MCH 33.3*   MCHC 34.9   RDW 11.9      MPV 8.5     Recent Labs   Lab 09/22/23 2001      K 4.0   CO2 26   BUN 7.4*   CREATININE 0.74   CALCIUM  8.5*   ALBUMIN 3.1*   ALKPHOS 69   ALT 17   AST 42*   BILITOT 0.7     Microbiology Results (last 7 days)       ** No results found for the last 168 hours. **             See below for Radiology    Scheduled Med:   ceFAZolin (ANCEF) IVPB  2 g Intravenous Q8H    cetirizine  10 mg Oral Daily    docusate sodium  100 mg Oral BID    fluticasone propionate  2 spray Each Nostril Daily    latanoprost  1 drop Left Eye QHS    montelukast  10 mg Oral Daily    mupirocin   Nasal BID    pantoprazole  40 mg Oral Daily    pravastatin  20 mg Oral Daily    sulfamethoxazole-trimethoprim 800-160mg  1 tablet Oral BID      Continuous Infusions:     PRN Meds:  aluminum-magnesium hydroxide-simethicone, gabapentin, HYDROcodone-acetaminophen, HYDROmorphone, methocarbamoL, ondansetron, pregabalin, senna-docusate 8.6-50 mg     Assessment/Plan:  Lumbar stenosis   Lumbar degenerative disc disease, spondylolisthesis with  radiculopathy   Leukocytosis , likely reactive in post op period  Normocytic anemia , mild  Hyperglycemia , check HbA1c   Essential HTN  Hyperlipidemia   Upper respiratory infection    Hx- GERD, OA, WOLFGANG on C-PAP, Anxiety /Depression     Plan-   S/P L4-5 laminectomy and posterior fusion, with extension of fusion from L3 to L5 on the right and L3 to L5 on the left   Pt reports he is improving as expected   Mild leukocytosis noted . Low suspicion for infectious process. This is most likely reactive in immediate post op period     Mild Anemia noted with Hgb lower from pre op period. Will monitor . Hemo vac in place   No signs of overt bleeding     BP is normal. Continue to hold home antihypertensives     Ok to stop Bactrim for URI as pt is on Ancef in post op period. Continue supportive treatment.        D/C plan per Primary     VTE prophylaxis: SCDs    Patient condition:  Fair    Anticipated discharge and Disposition:     Home with Home Health    All diagnosis and differential diagnosis have been reviewed; assessment and plan has  been documented; I have personally reviewed the labs and test results that are presently available; I have reviewed the patients medication list; I have reviewed the consulting providers response and recommendations. I have reviewed or attempted to review medical records based upon their availability    All of the patient's questions have been  addressed and answered. Patient's is agreeable to the above stated plan. I will continue to monitor closely and make adjustments to medical management as needed.        Rachana Perdomo MD   09/22/2023

## 2023-09-23 NOTE — PROGRESS NOTES
Hospital Progress Note  [facility] Neurosurgery      Admit Date: 9/20/2023  Post-operative Day: 3 Days Post-Op  Hospital Day: 4    SUBJECTIVE:     POD #3 s/p L4-5 laminectomy and posterior fusion, with extension of fusion from L3 to L5 on the right and L3 to L5 on the left     Interval History:  Mr. Murry is sitting on his bed with his wife in the room.  He reports minimal low back pain without any radiating leg pain, numbness, or weakness.  The patient has been ambulating without a walker and wants to go home soon.   Mr. Murry and his wife express much gratitude for his neurosurgical care.       Scheduled Meds:   ceFAZolin (ANCEF) IVPB  2 g Intravenous Q8H    cetirizine  10 mg Oral Daily    docusate sodium  100 mg Oral BID    fluticasone propionate  2 spray Each Nostril Daily    latanoprost  1 drop Left Eye QHS    montelukast  10 mg Oral Daily    mupirocin   Nasal BID    pantoprazole  40 mg Oral Daily    pravastatin  20 mg Oral Daily     Continuous Infusions:  PRN Meds:aluminum-magnesium hydroxide-simethicone, gabapentin, HYDROcodone-acetaminophen, HYDROmorphone, methocarbamoL, ondansetron, pregabalin, senna-docusate 8.6-50 mg    Review of patient's allergies indicates:   Allergen Reactions    Morphine Itching     Other reaction(s): Not Indicated       Past Medical History:   Diagnosis Date    Anxiety disorder, unspecified     Aortic regurgitation     Back pain     DDD (degenerative disc disease), lumbar     Depression     Essential (primary) hypertension     GERD (gastroesophageal reflux disease)     Lumbar stenosis with neurogenic claudication     Male erectile dysfunction, unspecified     Mixed hyperlipidemia     Neuropathy     Obesity, unspecified     WOLFGANG (obstructive sleep apnea)     uses CPAP    Right leg numbness     Right leg weakness     Unspecified osteoarthritis, unspecified site      Past Surgical History:   Procedure Laterality Date    BACK SURGERY      GALLBLADDER SURGERY  2010     "POSTERIOR LUMBAR INTERBODY FUSION (PLIF) WITH COMPUTER-ASSISTED NAVIGATION N/A 9/20/2023    Procedure: FUSION, SPINE, LUMBAR, PLIF, USING COMPUTER-ASSISTED NAVIGATION;  Surgeon: Abril Tyler MD;  Location: [facility];  Service: Neurosurgery;  Laterality: N/A;  L4-5 laminectomy with extension of L4-5 fusion from L2-3 and L3-4 instrumentation, o-arm  NTI  TIVA setup  [employer]- Colin //  XX    SINUS SURGERY      TOTAL KNEE ARTHROPLASTY Bilateral        OBJECTIVE:     Vital Signs (Most Recent)  Temp: 98.2 °F (36.8 °C) (09/23/23 0715)  Pulse: 91 (09/23/23 0715)  Resp: 19 (09/23/23 0956)  BP: 132/82 (09/23/23 0715)  SpO2: 95 % (09/23/23 0715)    Vital Signs Range (Last 24H):  Temp:  [97.6 °F (36.4 °C)-98.6 °F (37 °C)]   Pulse:  [82-98]   Resp:  [16-19]   BP: (117-132)/(71-82)   SpO2:  [95 %-97 %]     Drain: 170 cc/ 24 hrs, 50 cc/ 8 hrs, serosanguinous      Physical Exam:  General Alert and conversant, no acute distress      Motor Strength 5/5 x 4 extremities          Sensory Nl to light touch x 4 extremities      Wound Dressing- clean, dry, and intact        Laboratory Review:    CBC without Differential:  Lab Results   Component Value Date    WBC 13.68 (H) 09/22/2023    HGB 12.5 (L) 09/22/2023    HCT 35.8 (L) 09/22/2023     09/22/2023    MCV 95.5 (H) 09/22/2023     Differential:   No results found for: "NEUTROABS", "LYMPHSABS", "BASOSABS", "MONOSABS"    Basic Metabolic Panel:  Lab Results   Component Value Date     09/22/2023    K 4.0 09/22/2023    BUN 7.4 (L) 09/22/2023     Coagulation Studies:  Lab Results   Component Value Date    INR 0.9 09/15/2023    INR 1.0 12/03/2021    INR 1.0 08/14/2021    PROTIME 12.0 (L) 09/15/2023    PROTIME 13.2 12/03/2021    PROTIME 12.7 08/14/2021     Lab Results   Component Value Date    PTT 28.2 09/15/2023     Urinalysis:  Lab Results   Component Value Date    SPECGRAV 1.021 11/15/2007    LEUKOCYTESUR Trace (A) 09/15/2023    UROBILINOGEN 1.0 09/15/2023    "     ASSESSMENT/PLAN:     Mr. Murry is a 58 y.o. male who has a past medical history significant for hypertension, GERD, osteoarthritis, anxiety, and depression.  He presented as a follow-up patient in the neurosurgery clinic for progressively worsening low back pain and right leg pain for the last 2 years.  Mr. Murry is s/p a L2-3, L3-4 laminectomy and TLIF ([facility]) on 03/18/2020 by Dr. Hickman.  The patient is POD #3 s/p a L4-5 laminectomy and posterior fusion, with extension of fusion from L3 to L5 on the right and L3 to L5 on the left.  He is doing very well postoperatively with a normal motor and sensory neurological exam.        Plan:      1.  Continue Hemovac drain.  Continue Ancef as long as this drain remains in place.       2.  Mr. Murry is encouraged to continue mobilizing with physical therapy and occupational therapy.  He is to wear his Aspen LSO brace whenever his head of bed is greater than 30°.      3.  Recommendations from the hospitalist service are appreciated.  Bactrim for a sinus infection/ URI before surgery was discontinued, as he is currently on Ancef.      4.  The patient will be ready to be discharged to home after his Hemovac drain has reduced output in the next few days and has been removed.       5.  Arrangements have been made for Mr. Murry to follow up in the neurosurgery clinic with EDELMIRA Rosado for postoperative visit on Thursday, 10/05/2023 at 9:30 AM.          Abril Tyler MD  Neurosurgery

## 2023-09-23 NOTE — PROGRESS NOTES
[facility] Hospital Medicine Progress Note        Chief Complaint: Inpatient Follow-up for medical management     HPI:     Meir Murry is a 58 y.o. male with a past medical history of Essential hypertension, hyperlipidemia, Allergic rhinitis, GERD, osteoarthritis, lumbar degenerative disc disease, WOLFGANG, anxiety, and depression presented to [facility] under Dr. Tyler on 9/20/2023 for scheduled lumbar laminectomy.  Patient underwent L4-5 laminectomy and posterior fusion with extension of fusion from L3 to L5 on the right and L3-L5 on the left by Dr. Tyler on 09/20. Postoperatively patient was hypotensive, which improved with IV fluids.           Anti-hypersensitives were held. Hospital medicine team was consulted for medical management      Interval Hx:   Pt is seen at bedside with wife in the room   They have no new concern.   Reports he has sinus allergy  problem and is on monthly allergy shot.   BP continues to be normal.   Antihypertensive not needed so far     Hemovac drain remains in place   No AM labs today to review     Case was discussed with patient's nurse and  on the floor.    Objective/physical exam:  General: In no acute distress, afebrile  Chest: Clear to auscultation bilaterally  Heart: RRR, +S1, S2, no appreciable murmur  Abdomen: Soft, nontender, BS +  Torso - LSO brace in place  MSK: Warm, no lower extremity edema, no clubbing or cyanosis  Neurologic: Alert and oriented x4, Cranial nerve II-XII intact, Moves all ext with good strength         VITAL SIGNS: 24 HRS MIN & MAX LAST   Temp  Min: 97.6 °F (36.4 °C)  Max: 98.9 °F (37.2 °C) 98.4 °F (36.9 °C)   BP  Min: 117/72  Max: 132/82 123/71   Pulse  Min: 82  Max: 98  87   Resp  Min: 16  Max: 19 19   SpO2  Min: 94 %  Max: 97 % (!) 94 %     I have reviewed the following labs:  Recent Labs   Lab 09/22/23 2001   WBC 13.68*   RBC 3.75*   HGB 12.5*   HCT 35.8*   MCV 95.5*   MCH 33.3*   MCHC 34.9   RDW 11.9      MPV  8.5     Recent Labs   Lab 09/22/23 2001      K 4.0   CO2 26   BUN 7.4*   CREATININE 0.74   CALCIUM 8.5*   ALBUMIN 3.1*   ALKPHOS 69   ALT 17   AST 42*   BILITOT 0.7     Microbiology Results (last 7 days)       ** No results found for the last 168 hours. **             See below for Radiology    Scheduled Med:   ceFAZolin (ANCEF) IVPB  2 g Intravenous Q8H    cetirizine  10 mg Oral Daily    docusate sodium  100 mg Oral BID    fluticasone propionate  2 spray Each Nostril Daily    latanoprost  1 drop Left Eye QHS    montelukast  10 mg Oral Daily    mupirocin   Nasal BID    pantoprazole  40 mg Oral Daily    pravastatin  20 mg Oral Daily      Continuous Infusions:     PRN Meds:  aluminum-magnesium hydroxide-simethicone, gabapentin, HYDROcodone-acetaminophen, HYDROmorphone, methocarbamoL, ondansetron, pregabalin, senna-docusate 8.6-50 mg     Assessment/Plan:  Lumbar stenosis   Lumbar degenerative disc disease, spondylolisthesis with  radiculopathy   Leukocytosis , likely reactive in post op period  Normocytic anemia , mild  Hyperglycemia , check HbA1c   Essential HTN  Hyperlipidemia   Upper respiratory infection     Hx- GERD, OA, WOLFGANG on C-PAP, Anxiety /Depression      Plan-   S/P L4-5 laminectomy and posterior fusion, with extension of fusion from L3 to L5 on the right and L3 to L5 on the left on 9/20/23  Pt reports he is improving as expected     BP is normal. Continue to hold home antihypertensives      Ok to stop Bactrim for URI as pt is on Ancef in post op period. Continue supportive treatment.    Continue Cetrizine, Montelukast and Fluticasone NS for allergic rhinitis   D/C plan per Primary     VTE prophylaxis: SCDs    Patient condition:  Fair     Anticipated discharge and Disposition:     Home with home health     All diagnosis and differential diagnosis have been reviewed; assessment and plan has been documented; I have personally reviewed the labs and test results that are presently available; I have  reviewed the patients medication list; I have reviewed the consulting providers response and recommendations. I have reviewed or attempted to review medical records based upon their availability    All of the patient's questions have been  addressed and answered. Patient's is agreeable to the above stated plan. I will continue to monitor closely and make adjustments to medical management as needed.      Rachana Perdomo MD   09/23/2023

## 2023-09-24 VITALS
OXYGEN SATURATION: 97 % | BODY MASS INDEX: 30.62 KG/M2 | HEIGHT: 71 IN | TEMPERATURE: 98 F | RESPIRATION RATE: 18 BRPM | SYSTOLIC BLOOD PRESSURE: 160 MMHG | WEIGHT: 218.69 LBS | HEART RATE: 91 BPM | DIASTOLIC BLOOD PRESSURE: 94 MMHG

## 2023-09-24 PROCEDURE — 99024 PR POST-OP FOLLOW-UP VISIT: ICD-10-PCS | Mod: POP,,, | Performed by: NEUROLOGICAL SURGERY

## 2023-09-24 PROCEDURE — 25000003 PHARM REV CODE 250: Performed by: NEUROLOGICAL SURGERY

## 2023-09-24 PROCEDURE — 99024 POSTOP FOLLOW-UP VISIT: CPT | Mod: POP,,, | Performed by: NEUROLOGICAL SURGERY

## 2023-09-24 PROCEDURE — 63600175 PHARM REV CODE 636 W HCPCS: Performed by: NEUROLOGICAL SURGERY

## 2023-09-24 RX ORDER — HYDROCODONE BITARTRATE AND ACETAMINOPHEN 10; 325 MG/1; MG/1
1 TABLET ORAL EVERY 6 HOURS PRN
Qty: 28 TABLET | Refills: 0 | Status: SHIPPED | OUTPATIENT
Start: 2023-09-24 | End: 2023-10-01

## 2023-09-24 RX ORDER — BENZONATATE 100 MG/1
100 CAPSULE ORAL 3 TIMES DAILY
Status: DISCONTINUED | OUTPATIENT
Start: 2023-09-24 | End: 2023-09-24 | Stop reason: HOSPADM

## 2023-09-24 RX ADMIN — DOCUSATE SODIUM 100 MG: 100 CAPSULE, LIQUID FILLED ORAL at 08:09

## 2023-09-24 RX ADMIN — PRAVASTATIN SODIUM 20 MG: 10 TABLET ORAL at 08:09

## 2023-09-24 RX ADMIN — MUPIROCIN: 20 OINTMENT TOPICAL at 08:09

## 2023-09-24 RX ADMIN — CEFAZOLIN SODIUM 2 G: 2 SOLUTION INTRAVENOUS at 05:09

## 2023-09-24 RX ADMIN — HYDROCODONE BITARTRATE AND ACETAMINOPHEN 1 TABLET: 10; 325 TABLET ORAL at 11:09

## 2023-09-24 RX ADMIN — HYDROCODONE BITARTRATE AND ACETAMINOPHEN 1 TABLET: 10; 325 TABLET ORAL at 05:09

## 2023-09-24 RX ADMIN — MONTELUKAST 10 MG: 10 TABLET, FILM COATED ORAL at 08:09

## 2023-09-24 RX ADMIN — PANTOPRAZOLE SODIUM 40 MG: 40 TABLET, DELAYED RELEASE ORAL at 08:09

## 2023-09-24 RX ADMIN — FLUTICASONE PROPIONATE 100 MCG: 50 SPRAY, METERED NASAL at 08:09

## 2023-09-24 RX ADMIN — CETIRIZINE HYDROCHLORIDE 10 MG: 10 TABLET, FILM COATED ORAL at 08:09

## 2023-09-24 NOTE — DISCHARGE SUMMARY
[facility] Neuro  Neurosurgery  Discharge Summary      Patient Name: Meir Murry  MRN: [id number]  Admission Date: 9/20/2023  Hospital Length of Stay: 4 days  Discharge Date and Time:  09/24/2023 1:42 PM  Attending Physician: Abril Tyler MD   Discharging Provider: Carri Mcfadden [facility]  Primary Care Provider: Nely, Primary Doctor     Hospital course:     Patient is now postop day 4. Status post L4-5 laminectomy and posterior fusion with extension of fusion from L3-L5 on the right and L3-L5 on the left.  He has been doing very well.  Sitting up in the chair visiting with family.  He is reporting minimal back pain with no radiation to legs.  No numbness or weakness.  He has been ambulating and is ready to go home.  His Hemovac will be discontinued-this has put out minimal overnight.      He has a follow up appointment made.  He is to keep the wound clean and dry.  No powders ointments or creams.  Do not submerge in water.  His pain is well controlled.  NSAIDs were removed from his medication regimen.    Procedure(s) (LRB):  FUSION, SPINE, LUMBAR, PLIF, USING COMPUTER-ASSISTED NAVIGATION (N/A)     HPI: 58 y.o. male with a past medical history of Essential hypertension, hyperlipidemia, Allergic rhinitis, GERD, osteoarthritis, lumbar degenerative disc disease, WOLFGANG, anxiety, and depression presented to [facility] under Dr. Tyler on 9/20/2023 for scheduled lumbar laminectomy.  Patient underwent L4-5 laminectomy and posterior fusion with extension of fusion from L3 to L5 on the right and L3-L5 on the left by Dr. Tyler on 09/20.     Consults:   Consults (From admission, onward)          Status Ordering Provider     Inpatient consult to hospitalist  Once        Provider:  Eduin Rojo MD    Completed ABRIL TYLER     Inpatient consult to Orthotics  Once        Provider:  Falguni Woo JEANNETTE M.            Significant Diagnostic Studies: N/A    Pending Diagnostic  Studies:       None          Final Active Diagnoses:    Diagnosis Date Noted POA    PRINCIPAL PROBLEM:  Lumbar stenosis with neurogenic claudication [M48.062] 09/20/2023 Yes      Problems Resolved During this Admission:      Discharged Condition: fair    Disposition:   Discharged home    Follow Up:   Follow-up Information       Abrli Tyler MD Follow up.    Specialty: Neurosurgery  Why: Arrangements have been made for Mr. Murry to follow up in the neurosurgery clinic with EDELMIRA Rosado for postoperative visit on Thursday, 10/05/2023 at 9:30 AM.  Contact information:  [address] [place] [address] Dr Dale ROCK [id number] [phone]                           Patient Instructions:   No discharge procedures on file.  Medications:  Reconciled Home Medications:      Medication List        START taking these medications      HYDROcodone-acetaminophen  mg per tablet  Commonly known as: NORCO  Take 1 tablet by mouth every 6 (six) hours as needed for Pain.  Replaces: HYDROcodone-acetaminophen 5-325 mg per tablet            CONTINUE taking these medications      cetirizine 10 MG tablet  Commonly known as: ZYRTEC  Take 10 mg by mouth once daily.     fluticasone propionate 50 mcg/actuation nasal spray  Commonly known as: FLONASE  2 sprays by Each Nostril route once daily.     gabapentin 600 MG tablet  Commonly known as: NEURONTIN  Take 600 mg by mouth 3 (three) times daily as needed (alternates with Lyrica).     irbesartan 300 MG tablet  Commonly known as: AVAPRO  Take 300 mg by mouth once daily.     latanoprost 0.005 % ophthalmic solution  Place 1 drop into the left eye every evening.     montelukast 10 mg tablet  Commonly known as: SINGULAIR  Take 10 mg by mouth once daily.     omeprazole 20 MG capsule  Commonly known as: PRILOSEC  Take 20 mg by mouth once daily.     pregabalin 200 MG Cap  Commonly known as: LYRICA  Take 200 mg by mouth 2 (two) times daily as needed (alternates with gabapentin).            STOP taking these  medications      diclofenac sodium 1 % Gel  Commonly known as: VOLTAREN     HYDROcodone-acetaminophen 5-325 mg per tablet  Commonly known as: NORCO  Replaced by: HYDROcodone-acetaminophen  mg per tablet     ibuprofen 800 MG tablet  Commonly known as: ADVIL,MOTRIN     pravastatin 20 MG tablet  Commonly known as: PRAVACHOL            ASK your doctor about these medications      naproxen sodium 220 MG tablet  Commonly known as: ANAPROX  Take 220 mg by mouth 2 (two) times daily as needed.              Carri Mcfadden [facility]  Neurosurgery  [facility] Neuro

## 2023-09-25 NOTE — PROGRESS NOTES
[facility] Hospital Medicine Progress Note        Chief Complaint: Inpatient Follow-up for medical management     HPI:   Meir Murry is a 58 y.o. male with a past medical history of Essential hypertension, hyperlipidemia, Allergic rhinitis, GERD, osteoarthritis, lumbar degenerative disc disease, WOLFGANG, anxiety, and depression presented to [facility] under Dr. Tyler on 9/20/2023 for scheduled lumbar laminectomy.  Patient underwent L4-5 laminectomy and posterior fusion with extension of fusion from L3 to L5 on the right and L3-L5 on the left by Dr. Tyler on 09/20. Postoperatively patient was hypotensive, which improved with IV fluids.           Anti-hypersensitives were held. Hospital medicine team was consulted for medical management       Interval Hx:   C/O dry cough   Otherwise doing well. Afebrile. Stable hemodynamically.   He is walking in pathak with walker by himself   Denies tingling, numbness or weakness to the leg.   Hemovac drain in place with decreased drainage     Case was discussed with patient's nurse and  on the floor.    Objective/physical exam:  General: In no acute distress, afebrile  Chest: Clear to auscultation bilaterally  Heart: RRR, +S1, S2, no appreciable murmur  Abdomen: Soft, nontender, BS +  MSK: Warm, no lower extremity edema, no clubbing or cyanosis  Neurologic: Alert and oriented x4, Cranial nerve II-XII intact, Strength 5/5 in all 4 extremities    VITAL SIGNS: 24 HRS MIN & MAX LAST   Temp  Min: 97.5 °F (36.4 °C)  Max: 98.5 °F (36.9 °C) 98.4 °F (36.9 °C)   BP  Min: 120/79  Max: 160/94 (!) 160/94   Pulse  Min: 79  Max: 94  91   Resp  Min: 16  Max: 18 18   SpO2  Min: 94 %  Max: 97 % 97 %     I have reviewed the following labs:  Recent Labs   Lab 09/22/23 2001   WBC 13.68*   RBC 3.75*   HGB 12.5*   HCT 35.8*   MCV 95.5*   MCH 33.3*   MCHC 34.9   RDW 11.9      MPV 8.5     Recent Labs   Lab 09/22/23 2001      K 4.0   CO2 26   BUN 7.4*    CREATININE 0.74   CALCIUM 8.5*   ALBUMIN 3.1*   ALKPHOS 69   ALT 17   AST 42*   BILITOT 0.7     Microbiology Results (last 7 days)       ** No results found for the last 168 hours. **             See below for Radiology    Scheduled Med:     Continuous Infusions:     PRN Meds:       Assessment/Plan:  Lumbar stenosis   Lumbar degenerative disc disease, spondylolisthesis with  radiculopathy   Leukocytosis , likely reactive in post op period  Normocytic anemia , mild  Hyperglycemia , check HbA1c   Essential HTN  Hyperlipidemia   Upper respiratory infection     Hx- GERD, OA, WOLFGANG on C-PAP, Anxiety /Depression      Plan-   S/P L4-5 laminectomy and posterior fusion, with extension of fusion from L3 to L5 on the right and L3 to L5 on the left on 9/20/23  Pt reports he is improving as expected      BP is normal. Continue to hold home antihypertensives      Ok to stop Bactrim for URI as pt is on Ancef in post op period. Continue supportive treatment.    Continue Cetrizine, Montelukast and Fluticasone NS for allergic rhinitis   Add Tessalon for cough  D/C plan per Primary          All diagnosis and differential diagnosis have been reviewed; assessment and plan has been documented; I have personally reviewed the labs and test results that are presently available; I have reviewed the patients medication list; I have reviewed the consulting providers response and recommendations. I have reviewed or attempted to review medical records based upon their availability    All of the patient's questions have been  addressed and answered. Patient's is agreeable to the above stated plan. I will continue to monitor closely and make adjustments to medical management as needed.    Rachana Perdomo MD   09/24/2023

## 2023-09-28 ENCOUNTER — PATIENT MESSAGE (OUTPATIENT)
Dept: ADMINISTRATIVE | Facility: CLINIC | Age: 58
End: 2023-09-28
Payer: MEDICARE

## 2023-09-28 ENCOUNTER — PATIENT OUTREACH (OUTPATIENT)
Dept: ADMINISTRATIVE | Facility: CLINIC | Age: 58
End: 2023-09-28
Payer: MEDICARE

## 2023-09-28 NOTE — PROGRESS NOTES
C3 nurse attempted to contact Meir Murry for a TCC post hospital discharge follow up call. Patient answered but stated it was not a good time and requested a call back later today. The patient does not have a scheduled HOSFU appointment, and the pt does not have an [facility] PCP.  The patient does have a POST OP appointment scheduled with Abril Tyler MD (Neurosurgery) on 10/05/2023 at 9:30 AM.

## 2023-09-28 NOTE — PROGRESS NOTES
C3 nurse attempted to contact Meir Murry for a TCC post hospital discharge follow up call. No answer.  Voicemail left. The patient does not have a scheduled HOSFU appointment, and the pt does not have an [facility] PCP.  The patient does have a POST OP appointment scheduled with Abril Tyler MD (Neurosurgery) on 10/05/2023 at 9:30 AM.

## 2023-10-04 ENCOUNTER — TELEPHONE (OUTPATIENT)
Dept: NEUROSURGERY | Facility: CLINIC | Age: 58
End: 2023-10-04
Payer: MEDICARE

## 2023-10-05 ENCOUNTER — OFFICE VISIT (OUTPATIENT)
Dept: NEUROSURGERY | Facility: CLINIC | Age: 58
End: 2023-10-05
Payer: MEDICARE

## 2023-10-05 DIAGNOSIS — Z98.1 STATUS POST LUMBAR SPINAL FUSION: Primary | ICD-10-CM

## 2023-10-05 PROCEDURE — 99024 POSTOP FOLLOW-UP VISIT: CPT | Mod: POP,,, | Performed by: NURSE PRACTITIONER

## 2023-10-05 PROCEDURE — 99024 PR POST-OP FOLLOW-UP VISIT: ICD-10-PCS | Mod: POP,,, | Performed by: NURSE PRACTITIONER

## 2023-10-05 RX ORDER — HYDROCODONE BITARTRATE AND ACETAMINOPHEN 10; 325 MG/1; MG/1
1 TABLET ORAL EVERY 6 HOURS PRN
COMMUNITY

## 2023-10-05 RX ORDER — IBUPROFEN 800 MG/1
800 TABLET ORAL 3 TIMES DAILY
COMMUNITY
Start: 2023-09-24 | End: 2023-10-05

## 2023-10-05 NOTE — PROGRESS NOTES
[facility]  History & Physical  Neurosurgery      Meir Murry   [id number]   1965     SUBJECTIVE:     CHIEF COMPLAINT:  2 week Post-operative visit    HPI:  Meir Murry is a 58 y.o. male who presents for a 2 week post-operative follow-up.  He is s/p L4-5 laminectomy and posterior fusion with extension of fusion from L3-L5 by Dr. Tyler on 9/20/2023.     Prior to surgical intervention the patient reported pain radiating from the lower back into the right buttock and posterior leg.  He described a generalized fatigue, restless and weak sensation in the right lower extremity.  Increased walking aggravated his symptoms.  He would find some relief with lying down. Norco and Lyrica provided by Dr. Funez would provide a mild relief.    The patient returns today reporting the symptoms that are better since surgery.  The patient is complaining of pain in the lower back which he feels is much less intense than prior to surgical intervention.  He describes improvement in the fatigued and weak sensation into the right lower extremity as well.  He describes slightly diminished.  He reports he will wake up stiff in the morning although this does improve with Norco.  He is no longer requiring Lyrica.  He will occasionally have some numbness and tingling into the right lateral calf which he feels is mild.  He states he has remained compliant with our smoking cessation recommendations.  He rates his pain a 5/10 today.  He has remained compliant with his LSO.  He denies any fevers. He denies redness, warmth, drainage or tenderness to the surgical site.      Past Medical History:   Diagnosis Date    Anxiety disorder, unspecified     Aortic regurgitation     Back pain     DDD (degenerative disc disease), lumbar     Depression     Essential (primary) hypertension     GERD (gastroesophageal reflux disease)     Lumbar stenosis with neurogenic claudication     Male erectile dysfunction, unspecified     Mixed  hyperlipidemia     Neuropathy     Obesity, unspecified     WOLFGANG (obstructive sleep apnea)     uses CPAP    Right leg numbness     Right leg weakness     Unspecified osteoarthritis, unspecified site        Past Surgical History:   Procedure Laterality Date    BACK SURGERY      GALLBLADDER SURGERY  2010    POSTERIOR LUMBAR INTERBODY FUSION (PLIF) WITH COMPUTER-ASSISTED NAVIGATION N/A 9/20/2023    Procedure: FUSION, SPINE, LUMBAR, PLIF, USING COMPUTER-ASSISTED NAVIGATION;  Surgeon: Abril Tyler MD;  Location: [facility];  Service: Neurosurgery;  Laterality: N/A;  L4-5 laminectomy with extension of L4-5 fusion from L2-3 and L3-4 instrumentation, o-arm  NTI  TIVA setup  [employer]- Colin //  XX    SINUS SURGERY      TOTAL KNEE ARTHROPLASTY Bilateral        Family History   Problem Relation Age of Onset    Hypertension Mother        Social History     Socioeconomic History    Marital status:    Tobacco Use    Smoking status: Some Days     Types: Cigars     Passive exposure: Never    Smokeless tobacco: Never   Substance and Sexual Activity    Alcohol use: Yes     Comment: occasionally    Drug use: Never     Social Determinants of Health     Financial Resource Strain: Low Risk  (9/21/2023)    Overall Financial Resource Strain (CARDIA)     Difficulty of Paying Living Expenses: Not hard at all   Food Insecurity: No Food Insecurity (9/21/2023)    Hunger Vital Sign     Worried About Running Out of Food in the Last Year: Never true     Ran Out of Food in the Last Year: Never true   Transportation Needs: No Transportation Needs (9/21/2023)    PRAPARE - Transportation     Lack of Transportation (Medical): No     Lack of Transportation (Non-Medical): No   Social Connections: Unknown (9/21/2023)    Social Connection and Isolation Panel [NHANES]     Frequency of Communication with Friends and Family: More than three times a week     Frequency of Social Gatherings with Friends and Family: More than three times a week      Marital Status:    Housing Stability: Low Risk  (9/21/2023)    Housing Stability Vital Sign     Unable to Pay for Housing in the Last Year: No     Number of Places Lived in the Last Year: 1     Unstable Housing in the Last Year: No       Review of patient's allergies indicates:   Allergen Reactions    Morphine Itching     Other reaction(s): Not Indicated        Current Outpatient Medications   Medication Instructions    cetirizine (ZYRTEC) 10 mg, Oral, Daily    fluticasone propionate (FLONASE) 50 mcg/actuation nasal spray 2 sprays, Each Nostril, Daily    HYDROcodone-acetaminophen (NORCO)  mg per tablet 1 tablet, Oral, Every 6 hours PRN    irbesartan (AVAPRO) 300 mg, Oral, Daily    latanoprost 0.005 % ophthalmic solution 1 drop, Left Eye, Nightly    montelukast (SINGULAIR) 10 mg, Oral, Daily    mv-mn/FA/K1/resver/lutein/herb (ALIVE ENERGY 50 PLUS ORAL) Oral    omeprazole (PRILOSEC) 20 mg, Oral, Daily          Review of Systems   Constitutional:  Negative for chills, fever and weight loss.   HENT:  Negative for congestion, hearing loss, nosebleeds and tinnitus.    Eyes:  Negative for blurred vision, double vision and photophobia.   Respiratory:  Negative for cough, shortness of breath and wheezing.    Cardiovascular:  Negative for chest pain, palpitations and leg swelling.   Gastrointestinal:  Negative for constipation, diarrhea, nausea and vomiting.   Genitourinary:  Negative for dysuria, frequency and urgency.   Musculoskeletal:  Positive for back pain. Negative for falls and neck pain.   Skin:  Negative for itching and rash.   Neurological:  Positive for tingling (Intermittent right lateral calf). Negative for dizziness, tremors, sensory change, speech change, seizures, loss of consciousness and headaches.   Psychiatric/Behavioral:  Negative for depression, hallucinations and memory loss. The patient is not nervous/anxious.          OBJECTIVE:     Physical Exam    Visit Vitals  BP (P) 107/73 (BP  "Location: Right arm, Patient Position: Sitting)   Pulse (P) 89   Temp (P) 98.2 °F (36.8 °C) (Oral)   Resp (P) 17   Ht (P) 5' 11" (1.803 m)   Wt (P) 103.9 kg (229 lb)   BMI (P) 31.94 kg/m²        General:  Pleasant, Well-nourished, Well-groomed.    Cardiovascular:  Heart has regular rate and rhythm.    Lungs:  Breathing is quiet, non-lablored    Musculoskeletal:  Incision: healing well, no significant drainage, no dehiscence, no significant erythema.  ROM is  not evaluated as the patient remains in an LSO brace    Neurological:  Muscle strength against resistance:   Right Left   Hip abduction 5/5 5/5   Hip adduction 5/5 5/5   Knee extension (L3) 5/5 5/5   Knee flexion (L4) 5/5 5/5   Dorsiflexion (L5) 5/5 5/5   EHL (L5) 5/5 5/5   Plantar flexion (S1) 5/5 5/5   Sensation is intact to primary modalities in lower extremities except slightly diminished in the right lateral thigh    Reflexes:  Negative Babinski, Clonus, Sarkar, Tinel's, and Phalen's bilaterally.  Gait is normal  Coordination is normal.    Imaging:  All pertinent neuroimaging independently reviewed. Discussed these findings in detail with the patient.    ASSESSMENT:       ICD-10-CM ICD-9-CM   1. Status post lumbar spinal fusion  Z98.1 V45.4       PLAN:     1. Status post lumbar spinal fusion  - X-Ray Lumbar Spine AP and Lateral  Upright; Future  - Ambulatory referral/consult to Physical/Occupational Therapy; Future    Meir Murry returns today reporting improvement following surgical intervention.  His incision has healed nicely and is with minimal swelling today.  His staples were removed without any issues and Steri-Strips were placed.  He was advised to remain on a 15 lb lifting restriction and to avoid any type of bending, stooping or twisting activities.  He was advised to continue on with his LSO brace as previously instructed.  We will plan to follow up with the patient in approximately 4 weeks for a 6 week postop visit.  We will begin some " outpatient physical therapy at this time.  He and his wife were advised to notify us with any regression of symptoms or concerns prior to this follow-up visit.  He was advised to discuss refills of his medications with Dr. Funez.    Follow up in about 4 weeks (around 11/2/2023) for  6 week Post Op With Imaging Prior to Appt.    E/M Level Based On Time:   15 minutes spent on reviewing chart, which includes interpreting lab results and diagnostic tests.   20 minutes spent in the room with the patient performing a history and physical exam, counseling or educating the patient/caregiver, prescribing medications, ordering labwork/diagnostic tests, or placing referrals.   5 minutes spent collaborating plan of care with physician.   5 minutes spent documenting all relevant clinical informationin the electronic health record.     Total Time Spent: 45 minutes        JERSON Maldonado    Disclaimer:  This note is prepared using voice recognition software and as such is likely to have errors despite attempts at proofreading. Please contact me for questions.

## 2023-10-11 ENCOUNTER — TELEPHONE (OUTPATIENT)
Dept: NEUROSURGERY | Facility: CLINIC | Age: 58
End: 2023-10-11
Payer: MEDICARE

## 2023-10-24 ENCOUNTER — DOCUMENTATION ONLY (OUTPATIENT)
Dept: NEUROSURGERY | Facility: CLINIC | Age: 58
End: 2023-10-24
Payer: MEDICARE

## 2023-10-24 NOTE — PROGRESS NOTES
Per Julia anderson/ [facility]iprav[facility]GS[facility], looks like from the op note, he had an extension of a previous fusion and only L4-L5 was newly fused. If so, this patient does not qualify for a bone stim.

## 2023-10-30 NOTE — PROGRESS NOTES
[facility]  History & Physical  Neurosurgery      Meir Murry   [id number]   1965     SUBJECTIVE:     CHIEF COMPLAINT:  2 week Post-operative visit    HPI:  Meir Murry is a 58 y.o. male who presents for a 6 week post-operative follow-up.  He is s/p L4-5 laminectomy and posterior fusion with extension of fusion from L3-L5 by Dr. Tyler on 9/20/2023.     Prior to surgical intervention the patient reported pain radiating from the lower back into the right buttock and posterior leg.  He described a generalized fatigue, restless and weak sensation in the right lower extremity.  Increased walking aggravated his symptoms.  He would find some relief with lying down. Norco and Lyrica provided by Dr. Funez would provide a mild relief.    The patient returned to clinic on 10/5/2023 for his 2 week postoperative visit reporting improvement of his symptoms following surgical intervention.  He continued to have some pain into the lower back although this was much less intense.  He also had seen some improvement in his fatigue and weak sensation into the right lower extremity.  He continues to struggle with some stiffness into the lower back in the mornings which did respond well to Norco.    Since he was last seen in clinic he has begun some outpatient physical therapy at [facility] in [place].  He states with this modality he feels his strength and numbness has begun to improve.  He does continue to occasionally have some soreness into back although feels he does not need his gabapentin 3 times a day or Norco 4 times a day as prescribed.  He is remained compliant with his LSO brace and activity restrictions.  His incision has healed nicely.    Past Medical History:   Diagnosis Date    Anxiety disorder, unspecified     Aortic regurgitation     Back pain     DDD (degenerative disc disease), lumbar     Depression     Essential (primary) hypertension     GERD (gastroesophageal reflux disease)     Lumbar stenosis with  neurogenic claudication     Male erectile dysfunction, unspecified     Mixed hyperlipidemia     Neuropathy     Obesity, unspecified     WOLFGANG (obstructive sleep apnea)     uses CPAP    Right leg numbness     Right leg weakness     Unspecified osteoarthritis, unspecified site        Past Surgical History:   Procedure Laterality Date    BACK SURGERY      GALLBLADDER SURGERY  2010    POSTERIOR LUMBAR INTERBODY FUSION (PLIF) WITH COMPUTER-ASSISTED NAVIGATION N/A 9/20/2023    Procedure: FUSION, SPINE, LUMBAR, PLIF, USING COMPUTER-ASSISTED NAVIGATION;  Surgeon: Abril Tyler MD;  Location: [facility];  Service: Neurosurgery;  Laterality: N/A;  L4-5 laminectomy with extension of L4-5 fusion from L2-3 and L3-4 instrumentation, o-arm  NTI  TIVA setup  Medtronic- Colin //  XX    SINUS SURGERY      TOTAL KNEE ARTHROPLASTY Bilateral        Family History   Problem Relation Age of Onset    Hypertension Mother        Social History     Socioeconomic History    Marital status:    Tobacco Use    Smoking status: Some Days     Types: Cigars     Passive exposure: Never    Smokeless tobacco: Never   Substance and Sexual Activity    Alcohol use: Yes     Comment: occasionally    Drug use: Never     Social Determinants of Health     Financial Resource Strain: Low Risk  (9/21/2023)    Overall Financial Resource Strain (CARDIA)     Difficulty of Paying Living Expenses: Not hard at all   Food Insecurity: No Food Insecurity (9/21/2023)    Hunger Vital Sign     Worried About Running Out of Food in the Last Year: Never true     Ran Out of Food in the Last Year: Never true   Transportation Needs: No Transportation Needs (9/21/2023)    PRAPARE - Transportation     Lack of Transportation (Medical): No     Lack of Transportation (Non-Medical): No   Social Connections: Unknown (9/21/2023)    Social Connection and Isolation Panel [NHANES]     Frequency of Communication with Friends and Family: More than three times a week     Frequency of  Social Gatherings with Friends and Family: More than three times a week     Marital Status:    Housing Stability: Low Risk  (9/21/2023)    Housing Stability Vital Sign     Unable to Pay for Housing in the Last Year: No     Number of Places Lived in the Last Year: 1     Unstable Housing in the Last Year: No       Review of patient's allergies indicates:   Allergen Reactions    Morphine Itching     Other reaction(s): Not Indicated        Current Outpatient Medications   Medication Instructions    cetirizine (ZYRTEC) 10 mg, Oral, Daily    diclofenac sodium (VOLTAREN) 1 % Gel 3 times daily    fluticasone propionate (FLONASE) 50 mcg/actuation nasal spray 2 sprays, Each Nostril, Daily    gabapentin (NEURONTIN) 600 mg, Oral, 3 times daily    HYDROcodone-acetaminophen (NORCO)  mg per tablet 1 tablet, Oral, Every 6 hours PRN    ibuprofen (ADVIL,MOTRIN) 800 mg, Oral, 3 times daily    irbesartan (AVAPRO) 300 mg, Oral, Daily    latanoprost 0.005 % ophthalmic solution 1 drop, Left Eye, Nightly    montelukast (SINGULAIR) 10 mg, Oral, Daily    mv-mn/FA/K1/resver/lutein/herb (ALIVE ENERGY 50 PLUS ORAL) Oral    omeprazole (PRILOSEC) 20 mg, Oral, Daily    pravastatin (PRAVACHOL) 20 mg, Oral, Daily          Review of Systems   Constitutional:  Negative for chills, fever and weight loss.   HENT:  Negative for congestion, hearing loss, nosebleeds and tinnitus.    Eyes:  Negative for blurred vision, double vision and photophobia.   Respiratory:  Negative for cough, shortness of breath and wheezing.    Cardiovascular:  Negative for chest pain, palpitations and leg swelling.   Gastrointestinal:  Negative for constipation, diarrhea, nausea and vomiting.   Genitourinary:  Negative for dysuria, frequency and urgency.   Musculoskeletal:  Positive for back pain. Negative for falls and neck pain.   Skin:  Negative for itching and rash.   Neurological:  Positive for tingling (Intermittent right lateral calf). Negative for dizziness,  "tremors, sensory change, speech change, seizures, loss of consciousness and headaches.   Psychiatric/Behavioral:  Negative for depression, hallucinations and memory loss. The patient is not nervous/anxious.          OBJECTIVE:     Physical Exam    Visit Vitals  /87 (BP Location: Right arm, Patient Position: Sitting)   Pulse 78   Resp 16   Ht 5' 11" (1.803 m)   Wt 107.5 kg (237 lb)   BMI 33.05 kg/m²          General:  Pleasant, Well-nourished, Well-groomed.    Cardiovascular:  Heart has regular rate and rhythm.    Lungs:  Breathing is quiet, non-lablored    Musculoskeletal:  Incision:  Well healed, no significant drainage, no dehiscence, no significant erythema.  Several palpable nodules of likely granular tissue along the distal end of the healed incision are noted  ROM is  not evaluated as the patient remains in an LSO brace    Neurological:  Muscle strength against resistance:   Right Left   Hip abduction 5/5 5/5   Hip adduction 5/5 5/5   Knee extension (L3) 5/5 5/5   Knee flexion (L4) 5/5 5/5   Dorsiflexion (L5) 5/5 5/5   EHL (L5) 5/5 5/5   Plantar flexion (S1) 5/5 5/5   Sensation is intact to primary modalities in lower extremities except slightly diminished in the right lateral thigh    Reflexes:  Negative Babinski, Clonus, Sarkar, Tinel's, and Phalen's bilaterally.  Gait is normal  Coordination is normal.    Imaging:  All pertinent neuroimaging independently reviewed. Discussed these findings in detail with the patient.    ASSESSMENT:       ICD-10-CM ICD-9-CM   1. Status post lumbar spinal fusion  Z98.1 V45.4         PLAN:   1. Status post lumbar spinal fusion  - X-Ray Lumbar Spine Ap Lateral w/Flex Ext; Future    Meir Murry returns today reporting improvement following surgical intervention with the additional benefit from outpatient physical therapy.  He failed to obtain x-rays prior to his visit today therefore he was instructed to do so at this time.  I will notify him once these results become " available.  He was encouraged to utilize Mederma lotion to decrease the granular tissue along his incision line.  We did have a lengthy discussion of how to safely wean his gabapentin and Norco today.  He was provided written instructions regarding proper weaning as well.  He verbalizes understanding. He was advised to remain on a 15 lb lifting restriction and to avoid any type of bending, stooping or twisting activities.  He was advised to continue on with his LSO brace as previously instructed.  We will plan to follow up with the patient for a 3 postoperative visit with updated x-rays for surveillance purposes at that time.  He was encouraged to continue on with outpatient physical therapy twice weekly.  He was advised to notify us of any regression in his symptoms or concerns prior to this follow-up visit.  This plan was discussed with the patient and he is in agreement to move forward.    Follow up in about 7 weeks (around 12/21/2023) for 3 mo Post-op, With Imaging Prior to Appt.    E/M Level Based On Time:   15 minutes spent on reviewing chart, which includes interpreting lab results and diagnostic tests.   20 minutes spent in the room with the patient performing a history and physical exam, counseling or educating the patient/caregiver, prescribing medications, ordering labwork/diagnostic tests, or placing referrals.   5 minutes spent collaborating plan of care with physician.   5 minutes spent documenting all relevant clinical informationin the electronic health record.     Total Time Spent: 45 minutes              JERSON Maldonado    Disclaimer:  This note is prepared using voice recognition software and as such is likely to have errors despite attempts at proofreading. Please contact me for questions.

## 2023-11-02 ENCOUNTER — OFFICE VISIT (OUTPATIENT)
Dept: NEUROSURGERY | Facility: CLINIC | Age: 58
End: 2023-11-02
Payer: MEDICARE

## 2023-11-02 ENCOUNTER — HOSPITAL ENCOUNTER (OUTPATIENT)
Dept: RADIOLOGY | Facility: HOSPITAL | Age: 58
Discharge: HOME | End: 2023-11-02
Attending: NURSE PRACTITIONER
Payer: MEDICARE

## 2023-11-02 VITALS
RESPIRATION RATE: 16 BRPM | WEIGHT: 237 LBS | BODY MASS INDEX: 33.18 KG/M2 | HEART RATE: 78 BPM | DIASTOLIC BLOOD PRESSURE: 87 MMHG | SYSTOLIC BLOOD PRESSURE: 139 MMHG | HEIGHT: 71 IN

## 2023-11-02 DIAGNOSIS — Z98.1 STATUS POST LUMBAR SPINAL FUSION: Primary | ICD-10-CM

## 2023-11-02 DIAGNOSIS — Z98.1 STATUS POST LUMBAR SPINAL FUSION: ICD-10-CM

## 2023-11-02 PROCEDURE — 99024 PR POST-OP FOLLOW-UP VISIT: ICD-10-PCS | Mod: POP,,, | Performed by: NURSE PRACTITIONER

## 2023-11-02 PROCEDURE — 99024 POSTOP FOLLOW-UP VISIT: CPT | Mod: POP,,, | Performed by: NURSE PRACTITIONER

## 2023-11-02 PROCEDURE — 72100 X-RAY EXAM L-S SPINE 2/3 VWS: CPT | Mod: TC

## 2023-11-02 RX ORDER — DICLOFENAC SODIUM 10 MG/G
GEL TOPICAL 3 TIMES DAILY
COMMUNITY
Start: 2023-10-30

## 2023-11-02 RX ORDER — PRAVASTATIN SODIUM 20 MG/1
20 TABLET ORAL DAILY
COMMUNITY
Start: 2023-10-10

## 2023-11-02 RX ORDER — IBUPROFEN 800 MG/1
800 TABLET ORAL 3 TIMES DAILY
COMMUNITY
Start: 2023-10-23

## 2023-11-02 RX ORDER — GABAPENTIN 600 MG/1
600 TABLET ORAL 3 TIMES DAILY
COMMUNITY
Start: 2023-10-30

## 2023-11-02 NOTE — PATIENT INSTRUCTIONS
Apply Mederma twice daily and massage perpendicularly across incision to smooth scar tissue.    Wean gabapentin by decreasing to twice a day (AM and PM) for 5-7 days. If you tolerate this you can decrease to only taking at bedtime for 5-7 days. If this is tolerated, take one night with gabapentin then one night without, alternating for 1 week then can discontinue completely.    Ok to take Norco as needed. Would decrease to 3 times a day for 2-4 days, then twice daily for 2-4 days and then just as needed. Also ok to take a half a tab or over the counter Tylenol if find the full dose on Norco is too strong.

## 2023-11-03 ENCOUNTER — TELEPHONE (OUTPATIENT)
Dept: NEUROSURGERY | Facility: CLINIC | Age: 58
End: 2023-11-03
Payer: MEDICARE

## 2023-11-03 NOTE — TELEPHONE ENCOUNTER
LVM asking patient to return my call regarding his stable postoperative x-rays.  There is no indication of hardware failure or loosening.  He seems to be healing appropriately at this time.

## 2023-11-08 ENCOUNTER — TELEPHONE (OUTPATIENT)
Dept: NEUROSURGERY | Facility: CLINIC | Age: 58
End: 2023-11-08
Payer: MEDICARE

## 2023-11-08 DIAGNOSIS — Z98.1 STATUS POST LUMBAR SPINAL FUSION: Primary | ICD-10-CM

## 2023-11-08 NOTE — TELEPHONE ENCOUNTER
I am recommending he obtain updated x-rays of the lumbar spine at this time.  I have entered the orders.  I would also recommend he reach out to Dr. Funez regarding possibly being prescribed a muscle relaxer as well.

## 2023-11-08 NOTE — TELEPHONE ENCOUNTER
I spoke with patient. He will obtain the xray tomorrow, 11/9, at [facility] at 7:30. He will reach out to Dr. Funez in reference to medications.

## 2023-11-08 NOTE — TELEPHONE ENCOUNTER
He is s/p L4-5 laminectomy and posterior fusion with extension of fusion from L3-L5 by Dr. Tyler on 9/20/2023. He was last seen by you for 6 wk post op on 11/2/23 and will return on 12/21/23 for 3 month po w/ xrays with you.     Thursday, 11/2, he had PT after he left here. The same day, he began with pain radiating from the lower back into the right buttock and posterior leg. He/ice to buttock area provides some relief. He finds it difficult to walk and get up from lying/sitting down due to the pain. He denies weakness. He feels the pain  has progressively worsened over the last few days. He is prescribed Norco 10, one every 6 hrs, by Dr. Funez, but is needing to take it every 4-5 hrs. He is also on Gabapentin 600 mg tid, also given by Dr. Funez. He thinks the medications are just making the pain. Anything to do?

## 2023-11-09 ENCOUNTER — TELEPHONE (OUTPATIENT)
Dept: NEUROSURGERY | Facility: CLINIC | Age: 58
End: 2023-11-09
Payer: MEDICARE

## 2023-11-09 ENCOUNTER — HOSPITAL ENCOUNTER (OUTPATIENT)
Dept: RADIOLOGY | Facility: HOSPITAL | Age: 58
Discharge: HOME | End: 2023-11-09
Attending: NURSE PRACTITIONER
Payer: MEDICARE

## 2023-11-09 DIAGNOSIS — Z98.1 STATUS POST LUMBAR SPINAL FUSION: ICD-10-CM

## 2023-11-09 DIAGNOSIS — M43.16 SPONDYLOLISTHESIS OF LUMBAR REGION: Primary | ICD-10-CM

## 2023-11-09 PROCEDURE — 72100 X-RAY EXAM L-S SPINE 2/3 VWS: CPT | Mod: TC

## 2023-11-09 NOTE — TELEPHONE ENCOUNTER
Spoke with the patient regarding his stable x-ray findings.  He is describing what sounds like muscle spasms into the lower back with physical activity.  He states he does get some relief with utilization of heat and ice.  He continues on with Norco as prescribed per Dr. Funez.  I encouraged him to reach out to either Dr. Funez or his primary care provider to obtain a muscle relaxer at this time.  He was advised to rest the remainder of the week and return to physical therapy next week.  He was advised also to notify us should his symptoms not improve by early next week at which time we could possibly try a Medrol Dosepak.  He verbalizes understanding.

## 2023-11-09 NOTE — TELEPHONE ENCOUNTER
Attempted to call patient regarding his stable x-rays of the lumbar spine. LVM asking him to return my call. Thanks

## 2023-11-13 RX ORDER — METHYLPREDNISOLONE 4 MG/1
TABLET ORAL
Qty: 21 EACH | Refills: 0 | Status: SHIPPED | OUTPATIENT
Start: 2023-11-13 | End: 2023-12-04

## 2023-11-13 NOTE — TELEPHONE ENCOUNTER
I would recommend the patient reach out to his primary care provider at this time for a muscle relaxer as he is greater than 30 days postoperative and therefore we are unable to prescribe this type of medication for him at this time.  He called describing what sounds like muscle spasms, therefore I do feel this type of medication would be most helpful to him at this time.  I have also called in a Medrol Dosepak to the [facility] [address].  Thanks

## 2023-11-13 NOTE — TELEPHONE ENCOUNTER
The patient called back stating that he is unable to get a muscle relaxer from his pain management doctor.  They told him to contact us.  He is scheduled to follow up with them in December.  He would like a MDP called in because he is in a lot of pain.  Please advise..DENISSE Martin [facility] [address] [facility] in [place].

## 2023-12-19 NOTE — PROGRESS NOTES
[facility]  History & Physical  Neurosurgery      Meir Murry   [id number]   1965     SUBJECTIVE:     CHIEF COMPLAINT:  3 month Post-operative visit    HPI:  Meir Murry is a 58 y.o. male who presents for a 3 month post-operative follow-up.  He is s/p L4-5 laminectomy and posterior fusion with extension of fusion from L3-L5 by Dr. Tyler on 9/20/2023.     Prior to surgical intervention the patient reported pain radiating from the lower back into the right buttock and posterior leg.  He described a generalized fatigue, restless and weak sensation in the right lower extremity.  Increased walking aggravated his symptoms.  He would find some relief with lying down. Norco and Lyrica provided by Dr. Funez would provide a mild relief.    The patient  returns to clinic today describing continued numbness intermittently into the right leg.  He also continues to have some stiffness and soreness into lower back upon waking in the morning although this does improve once he gets up and moving.   He reports he has been managing well mostly with the utilization of muscle relaxers and Tylenol.  He does continue with gabapentin as well.  He has only required Norco for severe pain.   He will occasionally feel as though the right leg is weaker than the left although he is denying any falls or buckling of the leg.  Continues on with outpatient physical therapy at this time.  He is resting well for the most part.   He is denying any changes in bowel or bladder function.  He is denying any issues with balance. He has remained compliant with his LSO brace and activity restrictions.  His incision has healed nicely.    Past Medical History:   Diagnosis Date    Anxiety disorder, unspecified     Aortic regurgitation     Back pain     DDD (degenerative disc disease), lumbar     Depression     Essential (primary) hypertension     GERD (gastroesophageal reflux disease)     Lumbar stenosis with neurogenic claudication     Male  erectile dysfunction, unspecified     Mixed hyperlipidemia     Neuropathy     Obesity, unspecified     WOLFGANG (obstructive sleep apnea)     uses CPAP    Right leg numbness     Right leg weakness     Unspecified osteoarthritis, unspecified site        Past Surgical History:   Procedure Laterality Date    BACK SURGERY      GALLBLADDER SURGERY  2010    POSTERIOR LUMBAR INTERBODY FUSION (PLIF) WITH COMPUTER-ASSISTED NAVIGATION N/A 9/20/2023    Procedure: FUSION, SPINE, LUMBAR, PLIF, USING COMPUTER-ASSISTED NAVIGATION;  Surgeon: Abril Tyelr MD;  Location: [facility];  Service: Neurosurgery;  Laterality: N/A;  L4-5 laminectomy with extension of L4-5 fusion from L2-3 and L3-4 instrumentation, o-arm  NTI  TIVA setup  [employer]tr[employer]- Colin //  XX    SINUS SURGERY      TOTAL KNEE ARTHROPLASTY Bilateral        Family History   Problem Relation Age of Onset    Hypertension Mother        Social History     Socioeconomic History    Marital status:    Tobacco Use    Smoking status: Some Days     Types: Cigars     Passive exposure: Never    Smokeless tobacco: Never   Substance and Sexual Activity    Alcohol use: Yes     Comment: occasionally    Drug use: Never     Social Determinants of Health     Financial Resource Strain: Low Risk  (9/21/2023)    Overall Financial Resource Strain (CARDIA)     Difficulty of Paying Living Expenses: Not hard at all   Food Insecurity: No Food Insecurity (9/21/2023)    Hunger Vital Sign     Worried About Running Out of Food in the Last Year: Never true     Ran Out of Food in the Last Year: Never true   Transportation Needs: No Transportation Needs (9/21/2023)    PRAPARE - Transportation     Lack of Transportation (Medical): No     Lack of Transportation (Non-Medical): No   Social Connections: Unknown (9/21/2023)    Social Connection and Isolation Panel [NHANES]     Frequency of Communication with Friends and Family: More than three times a week     Frequency of Social Gatherings with Friends and  Family: More than three times a week     Marital Status:    Housing Stability: Low Risk  (9/21/2023)    Housing Stability Vital Sign     Unable to Pay for Housing in the Last Year: No     Number of Places Lived in the Last Year: 1     Unstable Housing in the Last Year: No       Review of patient's allergies indicates:   Allergen Reactions    Morphine Itching     Other reaction(s): Not Indicated        Current Outpatient Medications   Medication Instructions    acetaminophen (TYLENOL) 500 mg, Oral, Every 6 hours PRN    cetirizine (ZYRTEC) 10 mg, Oral, Daily    diclofenac sodium (VOLTAREN) 1 % Gel 3 times daily    fluticasone propionate (FLONASE) 50 mcg/actuation nasal spray 2 sprays, Each Nostril, Daily    gabapentin (NEURONTIN) 600 mg, Oral, 3 times daily    HYDROcodone-acetaminophen (NORCO)  mg per tablet 1 tablet, Oral, Every 6 hours PRN    ibuprofen (ADVIL,MOTRIN) 800 mg, Oral, 3 times daily    irbesartan (AVAPRO) 300 mg, Oral, Daily    latanoprost 0.005 % ophthalmic solution 1 drop, Left Eye, Nightly    montelukast (SINGULAIR) 10 mg, Oral, Daily    mv-mn/FA/K1/resver/lutein/herb (ALIVE ENERGY 50 PLUS ORAL) Oral    pantoprazole (PROTONIX) 20 mg, Oral, Every morning    pravastatin (PRAVACHOL) 20 mg, Oral, Daily    tiZANidine (ZANAFLEX) 4 mg, Oral, 2 times daily          Review of Systems   Constitutional:  Negative for chills, fever and weight loss.   HENT:  Negative for congestion, hearing loss, nosebleeds and tinnitus.    Eyes:  Negative for blurred vision, double vision and photophobia.   Respiratory:  Negative for cough, shortness of breath and wheezing.    Cardiovascular:  Negative for chest pain, palpitations and leg swelling.   Gastrointestinal:  Negative for constipation, diarrhea, nausea and vomiting.   Genitourinary:  Negative for dysuria, frequency and urgency.   Musculoskeletal:  Positive for back pain. Negative for falls and neck pain.   Skin:  Negative for itching and rash.  "  Neurological:  Positive for tingling (Intermittent right lateral calf). Negative for dizziness, tremors, sensory change, speech change, seizures, loss of consciousness and headaches.   Psychiatric/Behavioral:  Negative for depression, hallucinations and memory loss. The patient is not nervous/anxious.          OBJECTIVE:     Physical Exam    Visit Vitals  /88 (BP Location: Left arm, Patient Position: Sitting)   Pulse 88   Resp 16   Ht 5' 11" (1.803 m)   Wt 112 kg (247 lb)   BMI 34.45 kg/m²            General:  Pleasant, Well-nourished, Well-groomed.    Cardiovascular:  Heart has regular rate and rhythm.    Lungs:  Breathing is quiet, non-lablored    Musculoskeletal:  Incision:  Well healed, no significant drainage, no dehiscence, no significant erythema.  Several palpable nodules of likely granular tissue along the distal end of the healed incision are noted  ROM is  not evaluated as the patient remains in an LSO brace    Neurological:  Muscle strength against resistance:   Right Left   Hip abduction 5/5 5/5   Hip adduction 5/5 5/5   Knee extension (L3) 5/5 5/5   Knee flexion (L4) 5/5 5/5   Dorsiflexion (L5) 5/5 5/5   EHL (L5) 5/5 5/5   Plantar flexion (S1) 5/5 5/5   Sensation is intact to primary modalities in lower extremities except slightly diminished in the right lateral thigh    Reflexes:  Negative Babinski, Clonus, Sarkar, Tinel's, and Phalen's bilaterally.  Gait is normal  Coordination is normal.    Imaging:  All pertinent neuroimaging independently reviewed. Discussed these findings in detail with the patient.    ASSESSMENT:       ICD-10-CM ICD-9-CM   1. Status post lumbar spinal fusion  Z98.1 V45.4           PLAN:   1. Status post lumbar spinal fusion  - X-Ray Lumbar Spine Ap Lateral w/Flex Ext; Future    Meir Murry returns today reporting improvement following surgical intervention with the additional benefit from outpatient physical therapy.    I did take the time to review his recent x-rays " with him in clinic today.  I advised he can begin weaning his LSO brace at this time.  He was encouraged to continue on with outpatient physical therapy at this time.  I did state he could begin increasing his lifting restriction by 5 lb per week as tolerated.  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  He verbalizes understanding.   He will continue on a pain management with Dr. Funez. We will plan to follow up with the patient for a 6 postoperative visit with updated x-rays for surveillance purposes at that time.  He was advised to notify us of any regression in his symptoms or concerns prior to this follow-up visit.  This plan was discussed with the patient and he is in agreement to move forward.    Follow up in about 3 months (around 3/21/2024).    E/M Level Based On Time:   15 minutes spent on reviewing chart, which includes interpreting lab results and diagnostic tests.   15 minutes spent in the room with the patient performing a history and physical exam, counseling or educating the patient/caregiver, prescribing medications, ordering labwork/diagnostic tests, or placing referrals.   5 minutes spent documenting all relevant clinical informationin the electronic health record.     Total Time Spent: 35 minutes                    JERSON Maldonado    Disclaimer:  This note is prepared using voice recognition software and as such is likely to have errors despite attempts at proofreading. Please contact me for questions.

## 2023-12-21 ENCOUNTER — HOSPITAL ENCOUNTER (OUTPATIENT)
Dept: RADIOLOGY | Facility: HOSPITAL | Age: 58
Discharge: HOME | End: 2023-12-21
Attending: NURSE PRACTITIONER
Payer: MEDICARE

## 2023-12-21 ENCOUNTER — OFFICE VISIT (OUTPATIENT)
Dept: NEUROSURGERY | Facility: CLINIC | Age: 58
End: 2023-12-21
Payer: MEDICARE

## 2023-12-21 VITALS
SYSTOLIC BLOOD PRESSURE: 137 MMHG | DIASTOLIC BLOOD PRESSURE: 88 MMHG | BODY MASS INDEX: 34.58 KG/M2 | HEIGHT: 71 IN | HEART RATE: 88 BPM | RESPIRATION RATE: 16 BRPM | WEIGHT: 247 LBS

## 2023-12-21 DIAGNOSIS — Z98.1 STATUS POST LUMBAR SPINAL FUSION: Primary | ICD-10-CM

## 2023-12-21 DIAGNOSIS — Z98.1 STATUS POST LUMBAR SPINAL FUSION: ICD-10-CM

## 2023-12-21 PROCEDURE — 72110 X-RAY EXAM L-2 SPINE 4/>VWS: CPT | Mod: TC

## 2023-12-21 PROCEDURE — 99214 PR OFFICE/OUTPT VISIT, EST, LEVL IV, 30-39 MIN: ICD-10-PCS | Mod: ,,, | Performed by: NURSE PRACTITIONER

## 2023-12-21 PROCEDURE — 99214 OFFICE O/P EST MOD 30 MIN: CPT | Mod: ,,, | Performed by: NURSE PRACTITIONER

## 2023-12-21 RX ORDER — PANTOPRAZOLE SODIUM 20 MG/1
20 TABLET, DELAYED RELEASE ORAL EVERY MORNING
COMMUNITY
Start: 2023-12-11

## 2023-12-21 RX ORDER — ACETAMINOPHEN 500 MG
500 TABLET ORAL EVERY 6 HOURS PRN
COMMUNITY
End: 2024-03-21

## 2023-12-21 RX ORDER — TIZANIDINE 4 MG/1
4 TABLET ORAL 2 TIMES DAILY
COMMUNITY
Start: 2023-12-10

## 2023-12-21 NOTE — PATIENT INSTRUCTIONS
Ok to start weaning brace as tolerated.    Ok to begin increasing your lifting restriction by 5lbs a week as tolerated    Rely on Tylenol and muscle relaxers for pain, reserve Norco for severe pain    Continue Gapabentin as prescribed    Continue PT    Ok to return to work, be careful with bending, twisting, stooping and prolonged sitting activities

## 2024-02-06 ENCOUNTER — TELEPHONE (OUTPATIENT)
Dept: NEUROSURGERY | Facility: CLINIC | Age: 59
End: 2024-02-06
Payer: MEDICARE

## 2024-02-06 NOTE — TELEPHONE ENCOUNTER
The patient has called the office multiple times in regards to his physical therapy. He is wanting the order to be faxed to [facility]. We should have given him the order after his visit with Ashlee on 12/21/23. I did try to call the patient to advise of this. He did not answer so I left a detailed message for him.

## 2024-02-07 NOTE — TELEPHONE ENCOUNTER
The patient called the office again and left a message with Luh to inquire about his PT order. I spoke with him and he stated he would like to start doing PT where he did it in the past. He is requested to switch from [facility] to [facility]. I told him I will fax the order so he can begin PT at the new place.

## 2024-03-19 ENCOUNTER — TELEPHONE (OUTPATIENT)
Dept: NEUROSURGERY | Facility: CLINIC | Age: 59
End: 2024-03-19
Payer: MEDICARE

## 2024-03-19 ENCOUNTER — HOSPITAL ENCOUNTER (OUTPATIENT)
Dept: RADIOLOGY | Facility: HOSPITAL | Age: 59
Discharge: HOME | End: 2024-03-19
Attending: NURSE PRACTITIONER
Payer: MEDICARE

## 2024-03-19 DIAGNOSIS — Z98.1 STATUS POST LUMBAR SPINAL FUSION: ICD-10-CM

## 2024-03-19 PROCEDURE — 72110 X-RAY EXAM L-2 SPINE 4/>VWS: CPT | Mod: TC

## 2024-03-19 NOTE — TELEPHONE ENCOUNTER
The patient did not have his XR completed that was scheduled for yesterday. He will need this before his appointment with Dr. Tyler on 3/21/24. I tried to call him and his wife in regards to this. They did not answer so I LMOM.

## 2024-03-19 NOTE — TELEPHONE ENCOUNTER
The patients wife returned my call. I was able to speak with her regarding the XR that is needed. She did apologize as she was not aware that he had an appointment on 3/18. I r/s the XR at [facility] for 3/19/24 at 1:00. She was compliant w date and time.

## 2024-03-19 NOTE — TELEPHONE ENCOUNTER
The patients wife, Liliane did return my call but I was away from my desk at the time. I tried to call her back. She did not answer so I LMOM.

## 2024-03-21 ENCOUNTER — OFFICE VISIT (OUTPATIENT)
Dept: NEUROSURGERY | Facility: CLINIC | Age: 59
End: 2024-03-21
Payer: MEDICARE

## 2024-03-21 VITALS
BODY MASS INDEX: 32.48 KG/M2 | HEART RATE: 82 BPM | HEIGHT: 71 IN | WEIGHT: 232 LBS | SYSTOLIC BLOOD PRESSURE: 169 MMHG | DIASTOLIC BLOOD PRESSURE: 87 MMHG | RESPIRATION RATE: 16 BRPM

## 2024-03-21 DIAGNOSIS — Z98.1 STATUS POST LUMBAR SPINAL FUSION: Primary | ICD-10-CM

## 2024-03-21 DIAGNOSIS — M54.41 CHRONIC BILATERAL LOW BACK PAIN WITH RIGHT-SIDED SCIATICA: ICD-10-CM

## 2024-03-21 DIAGNOSIS — G89.29 CHRONIC BILATERAL LOW BACK PAIN WITH RIGHT-SIDED SCIATICA: ICD-10-CM

## 2024-03-21 PROCEDURE — 99213 OFFICE O/P EST LOW 20 MIN: CPT | Mod: ,,, | Performed by: NEUROLOGICAL SURGERY

## 2024-03-21 RX ORDER — AMOXICILLIN AND CLAVULANATE POTASSIUM 875; 125 MG/1; MG/1
1 TABLET, FILM COATED ORAL 2 TIMES DAILY
COMMUNITY
Start: 2024-03-14

## 2024-03-21 NOTE — PATIENT INSTRUCTIONS
Mr. Murry is a 58 y.o. male who has a past medical history significant for hypertension, GERD, osteoarthritis, chronic low back pain on Norco, anxiety, and depression.  He presents as a follow-up patient in the neurosurgery clinic s/p L4-5 laminectomy and extension of fusion on the right from L3 to L5 and on the left L2 to L5 6 months ago on 09/20/2023.  Postoperatively, the patient is delighted to report an improved level of pain as well as functional activity.  Mr. Murry is also s/p a L2-3, L3-4 laminectomy and TLIF ([facility]) on 03/18/2020 by Dr. Hickman.  He has a normal motor and sensory neurological exam in his bilateral lower extremities.    I reviewed pertinent imaging studies with the patient.  Lumbar spine x-rays demonstrate minimal levoscoliosis at L2-3.  There is minimal retrolisthesis of L1 on L2.  Posterior hardware in place on the left from L2-L5 and on the right from L3-L5.  There are interbody grafts in place at L2-3 and L3-4.  There is no motion on flexion-extension views.        PLAN:    Encounter Diagnoses   Name Primary?    Status post lumbar spinal fusion Yes    Chronic bilateral low back pain with right-sided sciatica      Orders Placed This Encounter   Procedures    Ambulatory referral/consult to Physical/Occupational Therapy        1.  I reassured Mr. Murry that he is making excellent postoperative recovery after a major lumbar fusion redo surgery.  He has been very happy with his functional improvement.    2.  Mr. Murry is being referred to the [facility] for additional PT sessions twice a week.    3.  The patient is to continue following with his pain management specialist Dr. Funez as scheduled every 3 months for refilling his hydrocodone.     4.  He is encouraged to discontinue smoking cigars, as nicotine inhibits tissue healing.      5.  Mr. Murry is encouraged to follow up in the neurosurgery clinic with EDELMIRA Rosado on an as-needed basis for any concerning  changes in condition or symptoms.        This note will be sent to the patient's referring provider No ref. provider found and primary care provider Abdirahman Gaytan Sr., MD.

## 2024-03-21 NOTE — PROGRESS NOTES
[facility]   Neurosurgery      Meir Murry  MRN: [id number], CSN: [id number]      : 1965   Age: 58 y.o. male  Payor: MEDICARE / Plan: MEDICARE PART A & B / Product Type: Government /       Ref:  No referring provider defined for this encounter.    PCP: Abdirahman Gaytan Sr., MD    Visit Date: 3/21/2024     Patient Active Problem List   Diagnosis    Gastroesophageal reflux disease without esophagitis    Environmental allergies    Hyperlipidemia    Hypertension    Obesity    DDD (degenerative disc disease), lumbar    Lumbar stenosis with neurogenic claudication       SUBJECTIVE:      CC:   Chief Complaint   Patient presents with    postop improvement in low back and R LE pain       HPI:   Mr. Murry is a 58 y.o. male who has a past medical history significant for hypertension, GERD, osteoarthritis, chronic low back pain on Norco, anxiety, and depression.  He presents as a follow-up patient in the neurosurgery clinic s/p L4-5 laminectomy and extension of fusion on the right from L3 to L5 and on the left L2 to L5 6 months ago on 2023.  Postoperatively, the patient is delighted to report an improved level of pain as well as functional activity.  Mr. Murry is also s/p a L2-3, L3-4 laminectomy and TLIF ([facility]) on 2020 by Dr. Hickman.       The patient's last date of visit in the neurosurgery clinic was on 2023 with EDELMIRA Rosado.  Preoperatively, he had chronic low back pain radiating into his right lateral leg with a pain level of 10/10.  Postoperatively, the patient has had improvement in these symptoms by about 50% with complete resolution of the preoperative numbness that he had his right leg.  His pain level is now much more manageable at 6/10.  Overall, he is very happy about his progress and marked functional improvement.  The plan of care was for Mr. Murry to discontinue wearing his LSO brace, gradually advance his activity, continue physical therapy, and follow up with  his established pain management specialist Dr. Funez for his Norco prescriptions.  In addition, follow up lumbar spine x-rays were ordered and completed.    Mr. Murry is currently participating in PT with a frequency of twice a week at the [facility].  Exercises have been beneficial in strengthening his right leg.  The patient expresses interest in extending his outpatient PT program.  In the meantime, he continues to follow with Dr. Funez, who prescribes his Norco.  The patient continues to take Norco 10/325 twice daily, which is similar to his preoperative dose.    He does not have any leg numbness, weakness, or bowel/ bladder incontinence.  Mr. Murry has been fully ambulatory.  He is now able to walk 1 mile at a time in a park or mall, which the patient was not able to do preoperatively.  In addition, Mr. Murry been to do some lawn work, which he enjoys.  There is increased pain in the mornings and with cold weather.  The patient has partial reduction of pain with PT exercises.       Patient Active Problem List    Diagnosis Date Noted    Lumbar stenosis with neurogenic claudication 09/20/2023    Gastroesophageal reflux disease without esophagitis 01/26/2023    Environmental allergies 01/26/2023    Hyperlipidemia 01/26/2023    Hypertension 01/26/2023    Obesity 01/26/2023    DDD (degenerative disc disease), lumbar      Past Medical History:   Diagnosis Date    Anxiety disorder, unspecified     Aortic regurgitation     Back pain     DDD (degenerative disc disease), lumbar     Depression     Essential (primary) hypertension     GERD (gastroesophageal reflux disease)     Lumbar stenosis with neurogenic claudication     Male erectile dysfunction, unspecified     Mixed hyperlipidemia     Neuropathy     Obesity, unspecified     WOLFGANG (obstructive sleep apnea)     uses CPAP    Right leg numbness     Right leg weakness     Unspecified osteoarthritis, unspecified site      Past Surgical  History:   Procedure Laterality Date    BACK SURGERY      GALLBLADDER SURGERY  2010    POSTERIOR LUMBAR INTERBODY FUSION (PLIF) WITH COMPUTER-ASSISTED NAVIGATION N/A 9/20/2023    Procedure: FUSION, SPINE, LUMBAR, PLIF, USING COMPUTER-ASSISTED NAVIGATION;  Surgeon: Abril Tyler MD;  [facility];  Service: Neurosurgery;  Laterality: N/A;  L4-5 laminectomy with extension of L4-5 fusion from L2-3 and L3-4 instrumentation, o-arm  NTI  TIVA setup  [employer]tronic- Colin //  XX    SINUS SURGERY      TOTAL KNEE ARTHROPLASTY Bilateral        Current Outpatient Medications:     amoxicillin-clavulanate 875-125mg (AUGMENTIN) 875-125 mg per tablet, Take 1 tablet by mouth 2 (two) times daily., Disp: , Rfl:     cetirizine (ZYRTEC) 10 MG tablet, Take 10 mg by mouth once daily., Disp: , Rfl:     diclofenac sodium (VOLTAREN) 1 % Gel, 3 (three) times daily., Disp: , Rfl:     fluticasone propionate (FLONASE) 50 mcg/actuation nasal spray, 2 sprays by Each Nostril route once daily., Disp: , Rfl:     gabapentin (NEURONTIN) 600 MG tablet, Take 600 mg by mouth 3 (three) times daily., Disp: , Rfl:     HYDROcodone-acetaminophen (NORCO)  mg per tablet, Take 1 tablet by mouth every 6 (six) hours as needed for Pain., Disp: , Rfl:     ibuprofen (ADVIL,MOTRIN) 800 MG tablet, Take 800 mg by mouth 3 (three) times daily., Disp: , Rfl:     irbesartan (AVAPRO) 300 MG tablet, Take 300 mg by mouth once daily., Disp: , Rfl:     latanoprost 0.005 % ophthalmic solution, Place 1 drop into the left eye every evening., Disp: , Rfl:     montelukast (SINGULAIR) 10 mg tablet, Take 10 mg by mouth once daily., Disp: , Rfl:     mv-mn/FA/K1/resver/lutein/herb (ALIVE ENERGY 50 PLUS ORAL), Take by mouth., Disp: , Rfl:     pantoprazole (PROTONIX) 20 MG tablet, Take 20 mg by mouth every morning., Disp: , Rfl:     pravastatin (PRAVACHOL) 20 MG tablet, Take 20 mg by mouth Daily., Disp: , Rfl:     tiZANidine (ZANAFLEX) 4 MG tablet, Take 4 mg by mouth 2 (two)  "times daily., Disp: , Rfl:     Review of patient's allergies indicates:   Allergen Reactions    Morphine Itching     Other reaction(s): Not Indicated       Social History     Tobacco Use    Smoking status: Some Days     Types: Cigars     Passive exposure: Never    Smokeless tobacco: Never   Substance Use Topics    Alcohol use: Yes     Comment: occasionally     Occupation: 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   HENT:  Negative for congestion and sore throat.    Eyes:  Negative for blurred vision and double vision.   Respiratory:  Negative for cough and shortness of breath.    Cardiovascular:  Negative for chest pain and palpitations.   Gastrointestinal:  Negative for constipation, diarrhea, nausea and vomiting.   Musculoskeletal:  Positive for back pain, Negative for neck pain.   Neurological:  Negative for focal weakness, sensory change, and headaches.   Endo/Heme/Allergies:  Does not bruise/bleed easily.   Psychiatric/Behavioral:  Positive for depression and anxiety.      OBJECTIVE:     EXAMINATION:  BP (!) 169/87   Pulse 82   Resp 16   Ht 5' 11" (1.803 m)   Wt 105.2 kg (232 lb)   BMI 32.36 kg/m²   Body Habitus: Normal    Physical Exam:  Constitutional: The patient is well-developed and cooperative, sitting comfortably in a chair    Mental Status:   Oriented to person, place, and time  Normal speech     Motor:  Muscle bulk: normal in all extremities  Tone: normal in all extremities     Lower extremities:  Hip flexors: right 5/5; left 5/5  Knee extensors: right 5/5; left 5/5  Knee flexors: right 5/5; left 5/5  Foot dorsiflexors: right 5/5; left 5/5  Foot plantar flexors: right 5/5; left 5/5  Extensor hallucis longus: right 5/5; left 5/5     Sensation:  Normal to light " touch in b LE     Reflexes:  Sarkars sign: right negative; left negative  Babinski: right downgoing; left downgoing  Clonus: right negative; left negative     Musculoskeletal:     Gait: normal     Straight leg test: right negative; left negative     Upper back: No pain with palpation  Lower back: Minimal pain with palpation in right lumbosacral region, well healed midline and bilateral parasagittal scars      DIAGNOSTICS REVIEW OF IMAGING, LAB & OTHER STUDIES:  I have personally reviewed and evaluated the following reports as well as radiographic studies:    Lumbar spine x-rays, 03/19/2024- minimal levoscoliosis at L2-3.  There is minimal retrolisthesis of L1 on L2.  Posterior hardware in place on the left from L2-L5 and on the right from L3-L5.  There are interbody grafts in place at L2-3 and L3-4.  There is no motion on flexion-extension views.      ASSESSMENT:  Mr. Murry is a 58 y.o. male who has a past medical history significant for hypertension, GERD, osteoarthritis, chronic low back pain on Norco, anxiety, and depression.  He presents as a follow-up patient in the neurosurgery clinic s/p L4-5 laminectomy and extension of fusion on the right from L3 to L5 and on the left L2 to L5 6 months ago on 09/20/2023.  Postoperatively, the patient is delighted to report an improved level of pain as well as functional activity.  Mr. Murry is also s/p a L2-3, L3-4 laminectomy and TLIF ([facility]) on 03/18/2020 by Dr. Hickman.  He has a normal motor and sensory neurological exam in his bilateral lower extremities.    I reviewed pertinent imaging studies with the patient.  Lumbar spine x-rays demonstrate minimal levoscoliosis at L2-3.  There is minimal retrolisthesis of L1 on L2.  Posterior hardware in place on the left from L2-L5 and on the right from L3-L5.  There are interbody grafts in place at L2-3 and L3-4.  There is no motion on flexion-extension views.        PLAN:    Encounter Diagnoses   Name Primary?    Status  post lumbar spinal fusion Yes    Chronic bilateral low back pain with right-sided sciatica      Orders Placed This Encounter   Procedures    Ambulatory referral/consult to Physical/Occupational Therapy        1.  I reassured Mr. Murry that he is making excellent postoperative recovery after a major lumbar fusion redo surgery.  He has been very happy with his functional improvement.    2.  Mr. Murry is being referred to the [facility] for additional PT sessions twice a week.    3.  The patient is to continue following with his pain management specialist Dr. Funez as scheduled every 3 months for refilling his hydrocodone.     4.  He is encouraged to discontinue smoking cigars, as nicotine inhibits tissue healing.      5.  Mr. Murry is encouraged to follow up in the neurosurgery clinic with EDELMIRA Rosado on an as-needed basis for any concerning changes in condition or symptoms.        This note will be sent to the patient's referring provider No ref. provider found and primary care provider Abdirahman Gaytan Sr., MD.           Abril Tyler MD  Neurosurgeon

## 2024-11-11 ENCOUNTER — LAB VISIT (OUTPATIENT)
Dept: LAB | Facility: HOSPITAL | Age: 59
End: 2024-11-11
Attending: INTERNAL MEDICINE
Payer: MEDICARE

## 2024-11-11 DIAGNOSIS — E07.9 DISEASE OF THYROID GLAND: ICD-10-CM

## 2024-11-11 DIAGNOSIS — E55.9 VITAMIN D DEFICIENCY: ICD-10-CM

## 2024-11-11 DIAGNOSIS — Z00.00 WELL ADULT EXAM: Primary | ICD-10-CM

## 2024-11-11 DIAGNOSIS — Z12.5 PROSTATE CANCER SCREENING: ICD-10-CM

## 2024-11-11 DIAGNOSIS — E78.2 MIXED HYPERLIPIDEMIA: ICD-10-CM

## 2024-11-11 DIAGNOSIS — I10 HYPERTENSION, ESSENTIAL: ICD-10-CM

## 2024-11-11 DIAGNOSIS — Z11.3 SCREENING EXAMINATION FOR VENEREAL DISEASE: ICD-10-CM

## 2024-11-11 LAB
25(OH)D3+25(OH)D2 SERPL-MCNC: 23 NG/ML (ref 30–80)
ALBUMIN SERPL-MCNC: 3.9 G/DL (ref 3.4–4.8)
ALBUMIN/GLOB SERPL: 1.4 RATIO (ref 1.1–2)
ALP SERPL-CCNC: 73 UNIT/L (ref 40–150)
ALT SERPL-CCNC: 15 UNIT/L (ref 0–55)
ANION GAP SERPL CALC-SCNC: 8 MEQ/L
AST SERPL-CCNC: 18 UNIT/L (ref 5–34)
BILIRUB SERPL-MCNC: 0.6 MG/DL
BUN SERPL-MCNC: 11.4 MG/DL (ref 8.4–25.7)
CALCIUM SERPL-MCNC: 9 MG/DL (ref 8.8–10)
CHLORIDE SERPL-SCNC: 104 MMOL/L (ref 98–107)
CHOLEST SERPL-MCNC: 182 MG/DL
CHOLEST/HDLC SERPL: 5 {RATIO} (ref 0–5)
CO2 SERPL-SCNC: 28 MMOL/L (ref 23–31)
CREAT SERPL-MCNC: 0.72 MG/DL (ref 0.72–1.25)
CREAT/UREA NIT SERPL: 16
ERYTHROCYTE [DISTWIDTH] IN BLOOD BY AUTOMATED COUNT: 11.9 % (ref 11.5–17)
EST. AVERAGE GLUCOSE BLD GHB EST-MCNC: 91.1 MG/DL
GFR SERPLBLD CREATININE-BSD FMLA CKD-EPI: >60 ML/MIN/1.73/M2
GLOBULIN SER-MCNC: 2.8 GM/DL (ref 2.4–3.5)
GLUCOSE SERPL-MCNC: 78 MG/DL (ref 82–115)
HBA1C MFR BLD: 4.8 %
HCT VFR BLD AUTO: 45.9 % (ref 42–52)
HDLC SERPL-MCNC: 34 MG/DL (ref 35–60)
HGB BLD-MCNC: 15.4 G/DL (ref 14–18)
LDLC SERPL CALC-MCNC: 104 MG/DL (ref 50–140)
MCH RBC QN AUTO: 32.5 PG (ref 27–31)
MCHC RBC AUTO-ENTMCNC: 33.6 G/DL (ref 33–36)
MCV RBC AUTO: 96.8 FL (ref 80–94)
MICROALBUMIN UR-MCNC: 12.5 UG/ML
NRBC BLD AUTO-RTO: 0 %
PLATELET # BLD AUTO: 349 X10(3)/MCL (ref 130–400)
PMV BLD AUTO: 8.3 FL (ref 7.4–10.4)
POTASSIUM SERPL-SCNC: 4.4 MMOL/L (ref 3.5–5.1)
PROT SERPL-MCNC: 6.7 GM/DL (ref 5.8–7.6)
PSA SERPL-MCNC: 1.47 NG/ML
RBC # BLD AUTO: 4.74 X10(6)/MCL (ref 4.7–6.1)
RPR SER QL: NORMAL
SODIUM SERPL-SCNC: 140 MMOL/L (ref 136–145)
T PALLIDUM AB SER QL: REACTIVE
TRIGL SERPL-MCNC: 219 MG/DL (ref 34–140)
TSH SERPL-ACNC: 2.04 UIU/ML (ref 0.35–4.94)
VLDLC SERPL CALC-MCNC: 44 MG/DL
WBC # BLD AUTO: 7.98 X10(3)/MCL (ref 4.5–11.5)

## 2024-11-11 PROCEDURE — 87591 N.GONORRHOEAE DNA AMP PROB: CPT

## 2024-11-11 PROCEDURE — 86592 SYPHILIS TEST NON-TREP QUAL: CPT

## 2024-11-11 PROCEDURE — 84153 ASSAY OF PSA TOTAL: CPT

## 2024-11-11 PROCEDURE — 80053 COMPREHEN METABOLIC PANEL: CPT

## 2024-11-11 PROCEDURE — 86695 HERPES SIMPLEX TYPE 1 TEST: CPT

## 2024-11-11 PROCEDURE — 36415 COLL VENOUS BLD VENIPUNCTURE: CPT

## 2024-11-11 PROCEDURE — 82306 VITAMIN D 25 HYDROXY: CPT

## 2024-11-11 PROCEDURE — 85027 COMPLETE CBC AUTOMATED: CPT

## 2024-11-11 PROCEDURE — 87389 HIV-1 AG W/HIV-1&-2 AB AG IA: CPT

## 2024-11-11 PROCEDURE — 86780 TREPONEMA PALLIDUM: CPT

## 2024-11-11 PROCEDURE — 80061 LIPID PANEL: CPT

## 2024-11-11 PROCEDURE — 82043 UR ALBUMIN QUANTITATIVE: CPT

## 2024-11-11 PROCEDURE — 83036 HEMOGLOBIN GLYCOSYLATED A1C: CPT | Mod: DBM

## 2024-11-11 PROCEDURE — 84443 ASSAY THYROID STIM HORMONE: CPT

## 2024-11-12 LAB
C TRACH RRNA SPEC QL NAA+PROBE: NEGATIVE
HIV 1+2 AB+HIV1 P24 AG SERPL QL IA: NONREACTIVE
HSV1 IGG SERPL QL IA: POSITIVE
HSV2 IGG SERPL QL IA: NEGATIVE
N GONORRHOEA RRNA SPEC QL NAA+PROBE: NEGATIVE
SPECIMEN SOURCE: NORMAL
SPECIMEN SOURCE: NORMAL

## 2024-11-13 LAB — T PALLIDUM AB SER QL AGGL: POSITIVE

## 2024-11-22 ENCOUNTER — TELEPHONE (OUTPATIENT)
Dept: NEUROSURGERY | Facility: CLINIC | Age: 59
End: 2024-11-22
Payer: MEDICARE

## 2024-11-22 DIAGNOSIS — Z98.1 STATUS POST LUMBAR SPINAL FUSION: Primary | ICD-10-CM

## 2024-11-22 NOTE — TELEPHONE ENCOUNTER
Please schedule the patient next available on Dr. Tyler's schedule with updated lumbar x-rays prior to this visit.  I have entered the orders. Thanks

## 2024-11-25 NOTE — TELEPHONE ENCOUNTER
I spoke with the patient to get him scheduled per Ashlee's recommendations. He is scheduled to see Dr. Tyler for 12/12/24 at 9:00,  XR at [facility] for 12/10/24 at 9:00. He was compliant w dates and times.

## 2024-12-09 NOTE — PROGRESS NOTES
[facility]   Neurosurgery      Meir Murry  MRN: [id number], CSN: [id number]      : 1965   Age: 59 y.o. male  Payor: MEDICARE / Plan: MEDICARE PART A & B / Product Type: Government /       Ref:  No referring provider defined for this encounter.    PCP: Abdirahman Gaytan Sr., MD    Visit Date: 12/10/2024     Patient Active Problem List   Diagnosis    Gastroesophageal reflux disease without esophagitis    Environmental allergies    Hyperlipidemia    Hypertension    Obesity    DDD (degenerative disc disease), lumbar    Lumbar stenosis with neurogenic claudication       SUBJECTIVE:      CC:   Chief Complaint   Patient presents with    Lumbar radiculopathy      Pain radiating from lower back into left lower extremity for the last 2-3 months.        HPI:   Mr. Murry is a 58 y.o. male who has a past medical history significant for hypertension, GERD, osteoarthritis, chronic low back pain on Norco, anxiety, and depression.  He presents as a follow-up patient in the neurosurgery clinic s/p L4-5 laminectomy and extension of fusion on the right from L3 to L5 and on the left L2 to L5 on 2023.  Postoperatively, the patient is delighted to report an improved level of pain as well as functional activity.  Mr. Murry is also s/p a L2-3, L3-4 laminectomy and TLIF ([facility]) on 2020 by Dr. Hickman.      Patient contacted the clinic on 2024 requesting an appointment. He states that it feels like a nerve pain that radiates into his lower back down into his left leg. Describes the pain as an aching pain that has been going on for about a month prior. Rated the pain a 5/10. The pain does occasionally wake him from his sleep. He denies any new surgeries, therapies or tests. He has not been seen by any other provider for this current pain. He has been taking hydrocodone twice daily that is provided to him by his pain management provider Dr. Funez. He was last seen in the neurosurgery clinic on  "3/21/2024 with Dr. Tyler. Lumbar spine X-rays were ordered and the patient was added to scheduled to be seen in clinic.     Mr. Murry presents to the neurosurgery clinic today with complaints of low back pain that radiates into left lower extremity.  Pain originates in the lower back and radiates down the anterior aspect of the LLE to the dorsum of the left foot. Reports pain started about 2-3 months ago.  Denies any known trauma.  Denies recent falls.  He describes the pain as "nerve pain" that is an intermittent, throbbing ache.  At times this can be a sharp burn.  Denies paraesthesias. Rates his pain level a 6/10 today. Pain is increased with by extended periods of sitting.  Pain is at its worst in the morning when getting out of bed. As he moves throughout the day the pain improves.  He takes Norco twice daily as well as gabapentin 3 times daily for pain.  Reports moderate improvement with these medications. Denies associated weakness. Despite the pain he remains active daily.  Denies issues with balance.  Denies bowel and bladder dysfunction.    He is requesting a new shower chair today.  Can not tolerate standing in one place for long periods of time such as the shower.  His current tears 3 to 4 years old and falling apart.  He does not want to risk falling in the shower.  Requesting to be sent to physical therapy as he feels this benefited him in the past.  He is already followed by pain management with Dr. Funez who manages his pain medication. Inquired about possible injections.       Patient Active Problem List    Diagnosis Date Noted    Lumbar stenosis with neurogenic claudication 09/20/2023    Gastroesophageal reflux disease without esophagitis 01/26/2023    Environmental allergies 01/26/2023    Hyperlipidemia 01/26/2023    Hypertension 01/26/2023    Obesity 01/26/2023    DDD (degenerative disc disease), lumbar      Past Medical History:   Diagnosis Date    Anxiety disorder, unspecified     Aortic " regurgitation     Back pain     DDD (degenerative disc disease), lumbar     Depression     Essential (primary) hypertension     GERD (gastroesophageal reflux disease)     Lumbar stenosis with neurogenic claudication     Male erectile dysfunction, unspecified     Mixed hyperlipidemia     Neuropathy     Obesity, unspecified     WOLFGANG (obstructive sleep apnea)     uses CPAP    Right leg numbness     Right leg weakness     Unspecified osteoarthritis, unspecified site      Past Surgical History:   Procedure Laterality Date    BACK SURGERY      GALLBLADDER SURGERY  2010    POSTERIOR LUMBAR INTERBODY FUSION (PLIF) WITH COMPUTER-ASSISTED NAVIGATION N/A 9/20/2023    Procedure: FUSION, SPINE, LUMBAR, PLIF, USING COMPUTER-ASSISTED NAVIGATION;  Surgeon: Abril Tyler MD;  Location: [facility];  Service: Neurosurgery;  Laterality: N/A;  L4-5 laminectomy with extension of L4-5 fusion from L2-3 and L3-4 instrumentation, o-arm  NTI  TIVA setup  Medtronic- Colin //  XX    SINUS SURGERY      TOTAL KNEE ARTHROPLASTY Bilateral        Current Outpatient Medications:     amoxicillin-clavulanate 875-125mg (AUGMENTIN) 875-125 mg per tablet, Take 1 tablet by mouth 2 (two) times daily., Disp: , Rfl:     cetirizine (ZYRTEC) 10 MG tablet, Take 10 mg by mouth once daily., Disp: , Rfl:     diclofenac sodium (VOLTAREN) 1 % Gel, 3 (three) times daily., Disp: , Rfl:     fluticasone propionate (FLONASE) 50 mcg/actuation nasal spray, 2 sprays by Each Nostril route once daily., Disp: , Rfl:     gabapentin (NEURONTIN) 600 MG tablet, Take 600 mg by mouth 3 (three) times daily., Disp: , Rfl:     HYDROcodone-acetaminophen (NORCO)  mg per tablet, Take 1 tablet by mouth every 6 (six) hours as needed for Pain., Disp: , Rfl:     hydrocortisone (ANUSOL-HC) 25 mg suppository, 1 suppository., Disp: , Rfl:     ibuprofen (ADVIL,MOTRIN) 800 MG tablet, Take 800 mg by mouth 3 (three) times daily., Disp: , Rfl:     irbesartan (AVAPRO) 300 MG tablet, Take 300 mg  by mouth once daily., Disp: , Rfl:     irbesartan-hydrochlorothiazide (AVALIDE) 300-12.5 mg per tablet, 1 tab(s) orally once a day, Disp: , Rfl:     latanoprost 0.005 % ophthalmic solution, Place 1 drop into the left eye every evening., Disp: , Rfl:     montelukast (SINGULAIR) 10 mg tablet, Take 10 mg by mouth once daily., Disp: , Rfl:     mv-mn/FA/K1/resver/lutein/herb (ALIVE ENERGY 50 PLUS ORAL), Take by mouth., Disp: , Rfl:     omeprazole (PRILOSEC) 20 MG capsule, 1 cap(s) orally once a day, Disp: , Rfl:     pantoprazole (PROTONIX) 20 MG tablet, Take 20 mg by mouth every morning., Disp: , Rfl:     pravastatin (PRAVACHOL) 20 MG tablet, Take 20 mg by mouth Daily., Disp: , Rfl:     tadalafiL (CIALIS) 20 MG Tab, 1 tablet as needed Orally Once a day, Disp: , Rfl:     tiZANidine (ZANAFLEX) 4 MG tablet, Take 4 mg by mouth 2 (two) times daily., Disp: , Rfl:     Review of patient's allergies indicates:   Allergen Reactions    Morphine Itching     Other reaction(s): Not Indicated       Social History     Tobacco Use    Smoking status: Some Days     Types: Cigars     Passive exposure: Never    Smokeless tobacco: Never   Substance Use Topics    Alcohol use: Yes     Comment: occasionally     Occupation: 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   HENT:  Negative for congestion and sore throat.    Eyes:  Negative for blurred vision and double vision.   Respiratory:  Negative for cough and shortness of breath.    Cardiovascular:  Negative for chest pain and palpitations.   Gastrointestinal:  Negative for constipation, diarrhea, nausea and vomiting.   Musculoskeletal:  Positive for back pain, Negative for neck pain.   Neurological:  Negative for focal weakness, sensory change, and  "headaches.   Endo/Heme/Allergies:  Does not bruise/bleed easily.   Psychiatric/Behavioral:  Positive for depression and anxiety.      OBJECTIVE:     EXAMINATION:  /74 (BP Location: Right arm, Patient Position: Sitting)   Pulse 99   Resp 16   Ht 5' 11" (1.803 m)   Wt 111.2 kg (245 lb 3.2 oz)   BMI 34.20 kg/m²   Body Habitus: Normal    Physical Exam:  Constitutional: The patient is well-developed and cooperative, sitting comfortably in a chair    Mental Status:   Oriented to person, place, and time  Normal speech     Motor:  Muscle bulk: normal in all extremities  Tone: normal in all extremities    Upper extremities:   Deltoid: right 5/5; left 5/5  Biceps: right 5/5; left 5/5  Triceps: right 5/5; left 5/5  Wrist extensors: right 5/5; left 5/5  Wrist flexors: right 5/5; left 5/5  : right 5/5; left 5/5  Interosseous muscles: right 5/5; left 5/5     Lower extremities:  Hip flexors: right 5/5; left 5/5  Knee extensors: right 5/5; left 5/5  Knee flexors: right 5/5; left 5/5  Foot dorsiflexors: right 5/5; left 5/5  Foot plantar flexors: right 5/5; left 5/5  Extensor hallucis longus: right 5/5; left 5/5     Sensation:  Normal to light touch in b LE     Reflexes:  Sarkars sign: right negative; left negative  Babinski: right downgoing; left downgoing  Clonus: right negative; left negative     Musculoskeletal:     Gait: normal     Straight leg test: right negative; left negative     Upper back: No pain with palpation  Lower back: Minimal pain with palpation in left lumbosacral region, well healed midline and bilateral parasagittal scars      DIAGNOSTICS REVIEW OF IMAGING, LAB & OTHER STUDIES:  I have personally reviewed and evaluated the following reports as well as radiographic studies:    Lumbar spine x-rays completed 12/10/2024 are stable when compared to imaging completed 03/19/2024. Imaging reveals minimal levoscoliosis at L2-3.  There is minimal retrolisthesis of L1 on L2.  Posterior hardware in place on " the left from L2-L5 and on the right from L3-L5.  There are interbody grafts in place at L2-3 and L3-4.  There is no motion on flexion-extension views.      ASSESSMENT:  Mr. Murry is a 58 y.o. male who has a past medical history significant for hypertension, GERD, osteoarthritis, chronic low back pain on Norco, anxiety, and depression.  He presents as a follow-up patient in the neurosurgery clinic s/p L4-5 laminectomy and extension of fusion on the right from L3 to L5 and on the left L2 to L5  on 09/20/2023.  Postoperatively, the patient is delighted to report an improved level of pain as well as functional activity.  Mr. Murry is also s/p a L2-3, L3-4 laminectomy and TLIF ([facility]) on 03/18/2020 by Dr. Hickman.  He has a normal motor and sensory neurological exam in his bilateral lower extremities.    I reviewed pertinent imaging studies with the patient.  Lumbar spine x-rays completed 12/10/2024 are stable when compared to imaging completed 03/19/2024. Imaging reveals minimal levoscoliosis at L2-3.  There is minimal retrolisthesis of L1 on L2.  Posterior hardware in place on the left from L2-L5 and on the right from L3-L5.  There are interbody grafts in place at L2-3 and L3-4.  There is no motion on flexion-extension views.        PLAN:    Encounter Diagnoses   Name Primary?    Status post lumbar spinal fusion     Lumbar stenosis with neurogenic claudication Yes       Orders Placed This Encounter   Procedures    BATH/SHOWER CHAIR FOR HOME USE    Ambulatory referral/consult to Pain Clinic    Ambulatory referral/consult to Physical/Occupational Therapy        Mr. Murry was provided with referrals to pain management clinic of Dr. Lugo/Dr. Watson for possible injections. He was provided a referral to [facility] to begin a round of physical therapy as this has been beneficial for him in the past. He as provided an order for a shower chair to be picked up at patients convenience.  He was  sent home with back exercises and stretches for core conditioning and strengthening. He was encouraged to continue taking pain medications as needed/prescribed. He will continue to keep routine follow ups with his pain management provider Dr. Funez who manages his pain medication. Encouraged to discontinue smoking cigars as this inhibits bony and tissue healing. Patient verbalized understanding. He will follow up in the neurosurgery clinic in 3 months following physical therapy and possible injection.     E/M Level Based On Time:   5 minutes spent on reviewing chart, which includes interpreting lab results and diagnostic tests.   25 minutes spent in the room with the patient performing a history and physical exam, counseling or educating the patient/caregiver, prescribing medications, ordering labwork/diagnostic tests, or placing referrals.   5 minutes spent collaborating plan of care with physician.   15 minutes spent documenting all relevant clinical informationin the electronic health record.     Total Time Spent: 50 minutes     This note will be sent to the patient's referring provider No ref. provider found and primary care provider Abdirahman Gaytan Sr., MD.           Flora Verduzco, NIMOP-C  Neurosurgery

## 2024-12-10 ENCOUNTER — HOSPITAL ENCOUNTER (OUTPATIENT)
Dept: RADIOLOGY | Facility: HOSPITAL | Age: 59
Discharge: HOME | End: 2024-12-10
Attending: NURSE PRACTITIONER
Payer: MEDICARE

## 2024-12-10 ENCOUNTER — OFFICE VISIT (OUTPATIENT)
Dept: NEUROSURGERY | Facility: CLINIC | Age: 59
End: 2024-12-10
Payer: MEDICARE

## 2024-12-10 VITALS
HEART RATE: 99 BPM | DIASTOLIC BLOOD PRESSURE: 74 MMHG | BODY MASS INDEX: 34.33 KG/M2 | SYSTOLIC BLOOD PRESSURE: 112 MMHG | WEIGHT: 245.19 LBS | HEIGHT: 71 IN | RESPIRATION RATE: 16 BRPM

## 2024-12-10 DIAGNOSIS — Z98.1 STATUS POST LUMBAR SPINAL FUSION: ICD-10-CM

## 2024-12-10 DIAGNOSIS — M48.062 LUMBAR STENOSIS WITH NEUROGENIC CLAUDICATION: Primary | ICD-10-CM

## 2024-12-10 PROCEDURE — 72110 X-RAY EXAM L-2 SPINE 4/>VWS: CPT | Mod: TC

## 2024-12-10 PROCEDURE — 99215 OFFICE O/P EST HI 40 MIN: CPT | Mod: ,,,

## 2024-12-10 RX ORDER — SODIUM, POTASSIUM,MAG SULFATES 17.5-3.13G
SOLUTION, RECONSTITUTED, ORAL ORAL
COMMUNITY
Start: 2024-09-27 | End: 2024-12-10

## 2024-12-10 RX ORDER — METHYLPREDNISOLONE 4 MG/1
TABLET ORAL
COMMUNITY
Start: 2024-07-10 | End: 2024-12-10

## 2024-12-10 RX ORDER — HYDROCORTISONE ACETATE 25 MG/1
1 SUPPOSITORY RECTAL
COMMUNITY

## 2024-12-10 RX ORDER — IRBESARTAN AND HYDROCHLOROTHIAZIDE 300; 12.5 MG/1; MG/1
TABLET, FILM COATED ORAL
COMMUNITY

## 2024-12-10 RX ORDER — SOD SULF/POT CHLORIDE/MAG SULF 1.479 G
TABLET ORAL
COMMUNITY
Start: 2024-11-04 | End: 2024-12-10

## 2024-12-10 RX ORDER — TADALAFIL 20 MG/1
TABLET ORAL
COMMUNITY

## 2024-12-10 RX ORDER — HYDROCODONE BITARTRATE AND ACETAMINOPHEN 10; 300 MG/1; MG/1
1 TABLET ORAL EVERY 8 HOURS PRN
COMMUNITY
Start: 2024-11-26 | End: 2024-12-10

## 2024-12-10 RX ORDER — OMEPRAZOLE 20 MG/1
CAPSULE, DELAYED RELEASE ORAL
COMMUNITY

## 2024-12-11 NOTE — PATIENT INSTRUCTIONS
Referral for PT to [facility]  Referral for pain management to Dr. Lugo/Walter   Order for shower chair provided to patient.     Continue with routine follow ups with pain management provider Dr. Funez.   Continue to take pain medication as needed/prescribed.  Begin physical therapy.  Encouraged proper hydration and stretching for spasm and muscle health.  Follow up in clinic in 3 months following PT and possible injection.

## 2024-12-17 ENCOUNTER — TELEPHONE (OUTPATIENT)
Dept: NEUROSURGERY | Facility: CLINIC | Age: 59
End: 2024-12-17
Payer: MEDICARE

## 2024-12-17 NOTE — TELEPHONE ENCOUNTER
"The patient called to report that he received a bill regarding his 01/04/2024 AWV visit with Dr. Lopez. The letter from Medicare indicates that they will not cover the consultation because it says \"Initial\" and they have to change the word so that it can be covered.      " I spoke with the patient to see if he has heard from [facility] regarding an appointment. He stated he is scheduled to go tomorrow, 12/18/24 at 7:30 am. He also told me that he did drop the script off at [facility] for his shower chair and [facility] told him insurance will not cover it. He will go to [facility] and buy another chair.

## 2025-02-06 ENCOUNTER — OFFICE VISIT (OUTPATIENT)
Facility: CLINIC | Age: 60
End: 2025-02-06
Payer: MEDICARE

## 2025-02-06 VITALS
BODY MASS INDEX: 33.6 KG/M2 | SYSTOLIC BLOOD PRESSURE: 129 MMHG | HEART RATE: 68 BPM | DIASTOLIC BLOOD PRESSURE: 80 MMHG | HEIGHT: 71 IN | WEIGHT: 240 LBS

## 2025-02-06 DIAGNOSIS — M48.062 LUMBAR STENOSIS WITH NEUROGENIC CLAUDICATION: ICD-10-CM

## 2025-02-06 DIAGNOSIS — G89.29 CHRONIC BACK PAIN GREATER THAN 3 MONTHS DURATION: ICD-10-CM

## 2025-02-06 DIAGNOSIS — M51.362 DEGENERATION OF INTERVERTEBRAL DISC OF LUMBAR REGION WITH DISCOGENIC BACK PAIN AND LOWER EXTREMITY PAIN: Primary | ICD-10-CM

## 2025-02-06 DIAGNOSIS — M54.9 DORSALGIA, UNSPECIFIED: ICD-10-CM

## 2025-02-06 DIAGNOSIS — M54.9 CHRONIC BACK PAIN GREATER THAN 3 MONTHS DURATION: ICD-10-CM

## 2025-02-06 PROCEDURE — 99214 OFFICE O/P EST MOD 30 MIN: CPT | Mod: ,,, | Performed by: ANESTHESIOLOGY

## 2025-02-06 RX ORDER — GABAPENTIN 800 MG/1
800 TABLET ORAL 3 TIMES DAILY
Qty: 90 TABLET | Refills: 5 | Status: SHIPPED | OUTPATIENT
Start: 2025-02-06

## 2025-02-06 RX ORDER — EPINEPHRINE 0.3 MG/.3ML
INJECTION SUBCUTANEOUS
COMMUNITY
Start: 2024-12-24

## 2025-02-06 RX ORDER — LIDOCAINE 50 MG/G
OINTMENT TOPICAL 3 TIMES DAILY PRN
Qty: 50 G | Refills: 2 | Status: SHIPPED | OUTPATIENT
Start: 2025-02-06 | End: 2025-02-28 | Stop reason: SDUPTHER

## 2025-02-06 NOTE — PROGRESS NOTES
Kimmy Lugo MD        PATIENT NAME: Meir Murry  : 1965  DATE: 25  MRN: [id number]      Billing Provider: Kimmy Lugo MD  Level of Service: CO OFFICE/OUTPT VISIT, EST, LEVL IV, 30-39 MIN  Patient PCP Information       Provider PCP Type    Abdirahman Gaytan Sr, MD General            Reason for Visit / Chief Complaint: Back Pain (Lower back pain, MRI & Ct L spine in imaging   referred by JERSON Hector C/O pain level 4, Taking Norco PRN Gabapentin TID, 2 lower back sxs, no recent injuries pt going to PT 2x a week pt would like RF of Voltaren gel )       Update PCP  Update Chief Complaint         History of Present Illness / Problem Focused Workflow     Meir Murry presents to the clinic with Back Pain (Lower back pain, MRI & Ct L spine in imaging   referred by JERSON Hector C/O pain level 4, Taking Norco PRN Gabapentin TID, 2 lower back sxs, no recent injuries pt going to PT 2x a week pt would like RF of Voltaren gel )     This is a 59-year-old male who presents to clinic today for his initial consultation as a referral from Neurosurgery.  He complains of low back pain that radiates down the left lower extremity to his foot.  On occasion, he has pain radiating down the right leg too, but his left leg is much worse.  He can not sit in a chair too long and has to get out and walk around after he has been driving for a while because of the pain.  He has difficulty falling asleep, and the pain wakes him up from sleeping.  He underwent lumbar spine surgery with Dr. Tyler in .  He did postoperative physical therapy in his in physical therapy twice per week currently.  He is taking gabapentin 3 times per day and occasionally takes [place].      Back Pain  Associated symptoms include leg pain.     Review of Systems     Review of Systems   Respiratory:  Positive for shortness of breath (on exertion).    Musculoskeletal:  Positive for back pain and leg pain.   Psychiatric/Behavioral:  Positive for  sleep disturbance.    All other systems reviewed and are negative.       Medical / Social / Family History     Past Medical History:   Diagnosis Date    Anxiety disorder, unspecified     Aortic regurgitation     Back pain     DDD (degenerative disc disease), lumbar     Depression     Essential (primary) hypertension     GERD (gastroesophageal reflux disease)     Lumbar stenosis with neurogenic claudication     Male erectile dysfunction, unspecified     Mixed hyperlipidemia     Neuropathy     Obesity, unspecified     WOLFGANG (obstructive sleep apnea)     uses CPAP    Right leg numbness     Right leg weakness     Unspecified osteoarthritis, unspecified site        Past Surgical History:   Procedure Laterality Date    BACK SURGERY      GALLBLADDER SURGERY  2010    POSTERIOR LUMBAR INTERBODY FUSION (PLIF) WITH COMPUTER-ASSISTED NAVIGATION N/A 9/20/2023    Procedure: FUSION, SPINE, LUMBAR, PLIF, USING COMPUTER-ASSISTED NAVIGATION;  Surgeon: Abril Tyler MD;  [facility]: [facility];  Service: Neurosurgery;  Laterality: N/A;  L4-5 laminectomy with extension of L4-5 fusion from L2-3 and L3-4 instrumentation, o-arm  NTI  TIVA setup  [employer]tr[employer]- Colin //  XX    SINUS SURGERY      TOTAL KNEE ARTHROPLASTY Bilateral        Social History  Mr. Murry  reports that he has been smoking cigars. He has never been exposed to tobacco smoke. He has never used smokeless tobacco. He reports current alcohol use. He reports that he does not use drugs.    Family History  'camryn Murry family history includes Hypertension in his mother.    Medications and Allergies     Medications  Outpatient Medications Marked as Taking for the 2/6/25 encounter (Office Visit) with Kimmy Lugo MD   Medication Sig Dispense Refill    cetirizine (ZYRTEC) 10 MG tablet Take 10 mg by mouth once daily.      diclofenac sodium (VOLTAREN) 1 % Gel 3 (three) times daily.      EPINEPHrine (EPIPEN) 0.3 mg/0.3 mL AtIn Inject into the muscle.      fluticasone propionate  (FLONASE) 50 mcg/actuation nasal spray 2 sprays by Each Nostril route once daily.      HYDROcodone-acetaminophen (NORCO)  mg per tablet Take 1 tablet by mouth every 6 (six) hours as needed for Pain.      hydrocortisone (ANUSOL-HC) 25 mg suppository 1 suppository.      ibuprofen (ADVIL,MOTRIN) 800 MG tablet Take 800 mg by mouth 3 (three) times daily.      irbesartan (AVAPRO) 300 MG tablet Take 300 mg by mouth once daily.      irbesartan-hydrochlorothiazide (AVALIDE) 300-12.5 mg per tablet 1 tab(s) orally once a day      latanoprost 0.005 % ophthalmic solution Place 1 drop into the left eye every evening.      montelukast (SINGULAIR) 10 mg tablet Take 10 mg by mouth once daily.      mv-mn/FA/K1/resver/lutein/herb (ALIVE ENERGY 50 PLUS ORAL) Take by mouth.      omeprazole (PRILOSEC) 20 MG capsule 1 cap(s) orally once a day      pantoprazole (PROTONIX) 20 MG tablet Take 20 mg by mouth every morning.      pravastatin (PRAVACHOL) 20 MG tablet Take 20 mg by mouth Daily.      tadalafiL (CIALIS) 20 MG Tab 1 tablet as needed Orally Once a day      tiZANidine (ZANAFLEX) 4 MG tablet Take 4 mg by mouth 2 (two) times daily.      [DISCONTINUED] amoxicillin-clavulanate 875-125mg (AUGMENTIN) 875-125 mg per tablet Take 1 tablet by mouth 2 (two) times daily.      [DISCONTINUED] gabapentin (NEURONTIN) 600 MG tablet Take 600 mg by mouth 3 (three) times daily.         Allergies  Review of patient's allergies indicates:   Allergen Reactions    Morphine Itching     Other reaction(s): Not Indicated       Physical Examination     Vitals:    02/06/25 0927   BP: 129/80   Pulse: 68     Pain Disability Index (PDI): 33       Spine Musculoskeletal Exam    Gait    Gait is normal.    Inspection    Thoracolumbar        Prior incision: midline lumbar    Incision: clean, dry, intact and well-healed    Incisional drainage: none    Palpation    Thoracolumbar    Tenderness: present      Paraspinous: left    Right      Masses: none      Spasms:  none      Crepitus: none      Muscle tone: normal    Left      Masses: none      Spasms: none      Crepitus: none      Muscle tone: normal    Range of Motion    Thoracolumbar        Thoracolumbar range of motion additional comments: Limited range of motion in the lumbar spine due to pain.    Strength    Thoracolumbar    Thoracolumbar motor exam is normal.       General      Constitutional: appears stated age, well-developed and well-nourished    Scleral icterus: no    Labored breathing: no    Psychiatric: normal mood and affect and no acute distress    Neurological: alert and oriented x3    Skin: intact    Lymphadenopathy: none     CV: extremities warm, well-perfused  Resp: nonlabored breathing    Assessment and Plan (including Health Maintenance)      Problem List  Smart Sets  Document Outside HM   :    Plan:   Degeneration of intervertebral disc of lumbar region with discogenic back pain and lower extremity pain  -     LIDOcaine (XYLOCAINE) 5 % Oint ointment; Apply topically 3 (three) times daily as needed (pain).  Dispense: 50 g; Refill: 2  -     gabapentin (NEURONTIN) 800 MG tablet; Take 1 tablet (800 mg total) by mouth 3 (three) times daily.  Dispense: 90 tablet; Refill: 5    Lumbar stenosis with neurogenic claudication  -     Ambulatory referral/consult to Pain Clinic  -     LIDOcaine (XYLOCAINE) 5 % Oint ointment; Apply topically 3 (three) times daily as needed (pain).  Dispense: 50 g; Refill: 2  -     gabapentin (NEURONTIN) 800 MG tablet; Take 1 tablet (800 mg total) by mouth 3 (three) times daily.  Dispense: 90 tablet; Refill: 5    Chronic back pain greater than 3 months duration  -     LIDOcaine (XYLOCAINE) 5 % Oint ointment; Apply topically 3 (three) times daily as needed (pain).  Dispense: 50 g; Refill: 2  -     gabapentin (NEURONTIN) 800 MG tablet; Take 1 tablet (800 mg total) by mouth 3 (three) times daily.  Dispense: 90 tablet; Refill: 5    Dorsalgia, unspecified  -     MRI Lumbar Spine Without  Contrast; Future; Expected date: 02/06/2025  -     LIDOcaine (XYLOCAINE) 5 % Oint ointment; Apply topically 3 (three) times daily as needed (pain).  Dispense: 50 g; Refill: 2  -     gabapentin (NEURONTIN) 800 MG tablet; Take 1 tablet (800 mg total) by mouth 3 (three) times daily.  Dispense: 90 tablet; Refill: 5           Problem List Items Addressed This Visit          Neuro    DDD (degenerative disc disease), lumbar - Primary    Relevant Medications    LIDOcaine (XYLOCAINE) 5 % Oint ointment    gabapentin (NEURONTIN) 800 MG tablet    Lumbar stenosis with neurogenic claudication    Relevant Medications    LIDOcaine (XYLOCAINE) 5 % Oint ointment    gabapentin (NEURONTIN) 800 MG tablet       Orthopedic    Chronic back pain greater than 3 months duration    Relevant Medications    LIDOcaine (XYLOCAINE) 5 % Oint ointment    gabapentin (NEURONTIN) 800 MG tablet     Other Visit Diagnoses       Dorsalgia, unspecified        Relevant Medications    LIDOcaine (XYLOCAINE) 5 % Oint ointment    gabapentin (NEURONTIN) 800 MG tablet    Other Relevant Orders    MRI Lumbar Spine Without Contrast              Future Appointments   Date Time Provider Department Center   2/28/2025 10:15 AM Kimmy Lugo MD [facility] [place]    3/13/2025  1:00 PM Flora Verduzco, [facility] [place]        There are no Patient Instructions on file for this visit.  No follow-ups on file.     Signature:  Kimmy Lugo MD      Date of encounter: 2/6/25

## 2025-02-26 ENCOUNTER — TELEPHONE (OUTPATIENT)
Dept: NEUROSURGERY | Facility: CLINIC | Age: 60
End: 2025-02-26
Payer: MEDICARE

## 2025-02-26 NOTE — TELEPHONE ENCOUNTER
I tried to call the patient to let him know that his upcoming appointment with Flora scheduled for 313/25 will need to be rescheduled. He did not answer so I left a detailed message requesting a call back.

## 2025-02-28 ENCOUNTER — OFFICE VISIT (OUTPATIENT)
Facility: CLINIC | Age: 60
End: 2025-02-28
Payer: MEDICARE

## 2025-02-28 VITALS
SYSTOLIC BLOOD PRESSURE: 129 MMHG | HEIGHT: 71 IN | OXYGEN SATURATION: 94 % | HEART RATE: 95 BPM | BODY MASS INDEX: 33.61 KG/M2 | DIASTOLIC BLOOD PRESSURE: 81 MMHG | WEIGHT: 240.06 LBS

## 2025-02-28 DIAGNOSIS — M54.9 CHRONIC BACK PAIN GREATER THAN 3 MONTHS DURATION: ICD-10-CM

## 2025-02-28 DIAGNOSIS — M54.9 DORSALGIA, UNSPECIFIED: ICD-10-CM

## 2025-02-28 DIAGNOSIS — M48.062 LUMBAR STENOSIS WITH NEUROGENIC CLAUDICATION: ICD-10-CM

## 2025-02-28 DIAGNOSIS — M51.362 DEGENERATION OF INTERVERTEBRAL DISC OF LUMBAR REGION WITH DISCOGENIC BACK PAIN AND LOWER EXTREMITY PAIN: Primary | ICD-10-CM

## 2025-02-28 DIAGNOSIS — G89.29 CHRONIC BACK PAIN GREATER THAN 3 MONTHS DURATION: ICD-10-CM

## 2025-02-28 RX ORDER — GABAPENTIN 600 MG/1
600 TABLET ORAL 3 TIMES DAILY
COMMUNITY
Start: 2025-02-19 | End: 2025-02-28

## 2025-02-28 RX ORDER — LIDOCAINE 50 MG/G
OINTMENT TOPICAL 3 TIMES DAILY PRN
Qty: 120 G | Refills: 2 | Status: SHIPPED | OUTPATIENT
Start: 2025-02-28

## 2025-02-28 NOTE — PROGRESS NOTES
Kimmy Lugo MD        PATIENT NAME: Meir Murry  : 1965  DATE: 25  MRN: [id number]      Billing Provider: Kimmy Lugo MD  Level of Service:   Patient PCP Information       Provider PCP Type    Abdirahman Gaytan Sr, MD General            Reason for Visit / Chief Complaint: Low-back Pain (Here today for follow up MRI from [facility]  /Patient states he is still having some lower back pain /Having trouble sleeping at night /Pain level today is 7/10/Patient has been doing therapy 2 days a week )       Update PCP  Update Chief Complaint         History of Present Illness / Problem Focused Workflow     Meir Murry presents to the clinic with Low-back Pain (Here today for follow up MRI from [facility]  /Patient states he is still having some lower back pain /Having trouble sleeping at night /Pain level today is 7/10/Patient has been doing therapy 2 days a week )     This is a 59-year-old male who presents to clinic today for follow up of low back pain that radiates down the left lower extremity to his foot.  On occasion, he has pain radiating down the right leg too, but his left leg is much worse.  He can not sit in a chair too long and has to get out and walk around after he has been driving for a while because of the pain.  He has difficulty falling asleep, and the pain wakes him up from sleeping.  He underwent lumbar spine surgery with Dr. Tyler in .  He did postoperative physical therapy in his in physical therapy twice per week currently.  He is taking gabapentin 3 times per day and occasionally takes Norco.  At his initial appointment here, I ordered an MRI of the lumbar spine for further evaluation of his symptoms.  He is back today to discuss the results.  I had also increase the dose of his gabapentin, which he has noted has been helping more.      Back Pain  Associated symptoms include leg pain.   Low-back Pain  Associated symptoms include leg pain.     Review of Systems     Review of  Systems   Respiratory:  Positive for shortness of breath (on exertion).    Musculoskeletal:  Positive for back pain and leg pain.   Psychiatric/Behavioral:  Positive for sleep disturbance.    All other systems reviewed and are negative.       Medical / Social / Family History     Past Medical History:   Diagnosis Date    Anxiety disorder, unspecified     Aortic regurgitation     Back pain     DDD (degenerative disc disease), lumbar     Depression     Essential (primary) hypertension     GERD (gastroesophageal reflux disease)     Lumbar stenosis with neurogenic claudication     Male erectile dysfunction, unspecified     Mixed hyperlipidemia     Neuropathy     Obesity, unspecified     WOLFGANG (obstructive sleep apnea)     uses CPAP    Right leg numbness     Right leg weakness     Unspecified osteoarthritis, unspecified site        Past Surgical History:   Procedure Laterality Date    BACK SURGERY      GALLBLADDER SURGERY  2010    POSTERIOR LUMBAR INTERBODY FUSION (PLIF) WITH COMPUTER-ASSISTED NAVIGATION N/A 9/20/2023    Procedure: FUSION, SPINE, LUMBAR, PLIF, USING COMPUTER-ASSISTED NAVIGATION;  Surgeon: Abril Tyler MD;  Location: [facility];  Service: Neurosurgery;  Laterality: N/A;  L4-5 laminectomy with extension of L4-5 fusion from L2-3 and L3-4 instrumentation, o-arm  NTI  TIVA setup  [employer]- Colin //  XX    SINUS SURGERY      TOTAL KNEE ARTHROPLASTY Bilateral        Social History  Mr. Murry  reports that he has been smoking cigars. He has never been exposed to tobacco smoke. He has never used smokeless tobacco. He reports current alcohol use. He reports that he does not use drugs.    Family History  'camryn Murry family history includes Hypertension in his mother.    Medications and Allergies     Medications  Outpatient Medications Marked as Taking for the 2/28/25 encounter (Office Visit) with Kimmy Lugo MD   Medication Sig Dispense Refill    cetirizine (ZYRTEC) 10 MG tablet Take  10 mg by mouth once daily.      diclofenac sodium (VOLTAREN) 1 % Gel 3 (three) times daily.      EPINEPHrine (EPIPEN) 0.3 mg/0.3 mL AtIn Inject into the muscle.      fluticasone propionate (FLONASE) 50 mcg/actuation nasal spray 2 sprays by Each Nostril route once daily.      HYDROcodone-acetaminophen (NORCO)  mg per tablet Take 1 tablet by mouth every 6 (six) hours as needed for Pain.      hydrocortisone (ANUSOL-HC) 25 mg suppository 1 suppository.      ibuprofen (ADVIL,MOTRIN) 800 MG tablet Take 800 mg by mouth 3 (three) times daily.      irbesartan (AVAPRO) 300 MG tablet Take 300 mg by mouth once daily.      latanoprost 0.005 % ophthalmic solution Place 1 drop into the left eye every evening.      montelukast (SINGULAIR) 10 mg tablet Take 10 mg by mouth once daily.      mv-mn/FA/K1/resver/lutein/herb (ALIVE ENERGY 50 PLUS ORAL) Take by mouth.      omeprazole (PRILOSEC) 20 MG capsule 1 cap(s) orally once a day      pantoprazole (PROTONIX) 20 MG tablet Take 20 mg by mouth every morning.      pravastatin (PRAVACHOL) 20 MG tablet Take 20 mg by mouth Daily.      tadalafiL (CIALIS) 20 MG Tab 1 tablet as needed Orally Once a day      tiZANidine (ZANAFLEX) 4 MG tablet Take 4 mg by mouth 2 (two) times daily.      [DISCONTINUED] gabapentin (NEURONTIN) 600 MG tablet Take 600 mg by mouth 3 (three) times daily.      [DISCONTINUED] LIDOcaine (XYLOCAINE) 5 % Oint ointment Apply topically 3 (three) times daily as needed (pain). 50 g 2       Allergies  Review of patient's allergies indicates:   Allergen Reactions    Morphine Itching     Other reaction(s): Not Indicated       Physical Examination     Vitals:    02/28/25 1017   BP: 129/81   Pulse: 95     Pain Disability Index (PDI): 37       Spine Musculoskeletal Exam    Gait    Gait is normal.    Inspection    Thoracolumbar        Prior incision: midline lumbar    Incision: clean, dry, intact and well-healed    Incisional drainage: none    Palpation     Thoracolumbar    Tenderness: present      Paraspinous: left    Right      Masses: none      Spasms: none      Crepitus: none      Muscle tone: normal    Left      Masses: none      Spasms: none      Crepitus: none      Muscle tone: normal    Range of Motion    Thoracolumbar        Thoracolumbar range of motion additional comments: Limited range of motion in the lumbar spine due to pain.    Strength    Thoracolumbar    Thoracolumbar motor exam is normal.       General      Constitutional: appears stated age, well-developed and well-nourished    Scleral icterus: no    Labored breathing: no    Psychiatric: normal mood and affect and no acute distress    Neurological: alert and oriented x3    Skin: intact    Lymphadenopathy: none     CV: extremities warm, well-perfused  Resp: nonlabored breathing    Assessment and Plan (including Health Maintenance)      Problem List  Smart Sets  Document Outside HM   :    Plan:   Degeneration of intervertebral disc of lumbar region with discogenic back pain and lower extremity pain  -     LIDOcaine (XYLOCAINE) 5 % Oint ointment; Apply topically 3 (three) times daily as needed (pain).  Dispense: 120 g; Refill: 2    Lumbar stenosis with neurogenic claudication  -     LIDOcaine (XYLOCAINE) 5 % Oint ointment; Apply topically 3 (three) times daily as needed (pain).  Dispense: 120 g; Refill: 2    Chronic back pain greater than 3 months duration  -     LIDOcaine (XYLOCAINE) 5 % Oint ointment; Apply topically 3 (three) times daily as needed (pain).  Dispense: 120 g; Refill: 2    Dorsalgia, unspecified  -     LIDOcaine (XYLOCAINE) 5 % Oint ointment; Apply topically 3 (three) times daily as needed (pain).  Dispense: 120 g; Refill: 2       He is being scheduled for left L4 and L5 transforaminal epidural steroid injections today to help with feeding chronic back pain radiating down the left lower extremity, consistent with findings of degenerative disc disease and nerve impingement seen on his  MRI.  I am also refilling his lidocaine ointment.  He voices understanding and is in agreement with the plan.    Problem List Items Addressed This Visit          Neuro    DDD (degenerative disc disease), lumbar - Primary    Relevant Medications    LIDOcaine (XYLOCAINE) 5 % Oint ointment    Lumbar stenosis with neurogenic claudication    Relevant Medications    LIDOcaine (XYLOCAINE) 5 % Oint ointment       Orthopedic    Chronic back pain greater than 3 months duration    Relevant Medications    LIDOcaine (XYLOCAINE) 5 % Oint ointment     Other Visit Diagnoses         Dorsalgia, unspecified        Relevant Medications    LIDOcaine (XYLOCAINE) 5 % Oint ointment              Future Appointments   Date Time Provider Department Center   3/13/2025  1:00 PM Flora Verduzco FNP [facility]        There are no Patient Instructions on file for this visit.  No follow-ups on file.     Signature:  Kimmy Lugo MD      Date of encounter: 2/28/25

## 2025-02-28 NOTE — H&P (VIEW-ONLY)
Kimmy Lugo MD        PATIENT NAME: Meir Murry  : 1965  DATE: 25  MRN: [id number]      Billing Provider: Kimmy Lugo MD  Level of Service:   Patient PCP Information       Provider PCP Type    Abdirahman Gaytan Sr, MD General            Reason for Visit / Chief Complaint: Low-back Pain (Here today for follow up MRI from [facility]  /Patient states he is still having some lower back pain /Having trouble sleeping at night /Pain level today is 7/10/Patient has been doing therapy 2 days a week )       Update PCP  Update Chief Complaint         History of Present Illness / Problem Focused Workflow     Meir Murry presents to the clinic with Low-back Pain (Here today for follow up MRI from [facility]  /Patient states he is still having some lower back pain /Having trouble sleeping at night /Pain level today is 7/10/Patient has been doing therapy 2 days a week )     This is a 59-year-old male who presents to clinic today for follow up of low back pain that radiates down the left lower extremity to his foot.  On occasion, he has pain radiating down the right leg too, but his left leg is much worse.  He can not sit in a chair too long and has to get out and walk around after he has been driving for a while because of the pain.  He has difficulty falling asleep, and the pain wakes him up from sleeping.  He underwent lumbar spine surgery with Dr. Tyler in .  He did postoperative physical therapy in his in physical therapy twice per week currently.  He is taking gabapentin 3 times per day and occasionally takes Norco.  At his initial appointment here, I ordered an MRI of the lumbar spine for further evaluation of his symptoms.  He is back today to discuss the results.  I had also increase the dose of his gabapentin, which he has noted has been helping more.      Back Pain  Associated symptoms include leg pain.   Low-back Pain  Associated symptoms include leg pain.     Review of Systems     Review of  Systems   Respiratory:  Positive for shortness of breath (on exertion).    Musculoskeletal:  Positive for back pain and leg pain.   Psychiatric/Behavioral:  Positive for sleep disturbance.    All other systems reviewed and are negative.       Medical / Social / Family History     Past Medical History:   Diagnosis Date    Anxiety disorder, unspecified     Aortic regurgitation     Back pain     DDD (degenerative disc disease), lumbar     Depression     Essential (primary) hypertension     GERD (gastroesophageal reflux disease)     Lumbar stenosis with neurogenic claudication     Male erectile dysfunction, unspecified     Mixed hyperlipidemia     Neuropathy     Obesity, unspecified     WOLFGANG (obstructive sleep apnea)     uses CPAP    Right leg numbness     Right leg weakness     Unspecified osteoarthritis, unspecified site        Past Surgical History:   Procedure Laterality Date    BACK SURGERY      GALLBLADDER SURGERY  2010    POSTERIOR LUMBAR INTERBODY FUSION (PLIF) WITH COMPUTER-ASSISTED NAVIGATION N/A 9/20/2023    Procedure: FUSION, SPINE, LUMBAR, PLIF, USING COMPUTER-ASSISTED NAVIGATION;  Surgeon: Abril Tyler MD;  Location: [facility];  Service: Neurosurgery;  Laterality: N/A;  L4-5 laminectomy with extension of L4-5 fusion from L2-3 and L3-4 instrumentation, o-arm  NTI  TIVA setup  [employer]- Colin //  XX    SINUS SURGERY      TOTAL KNEE ARTHROPLASTY Bilateral        Social History  Mr. Murry  reports that he has been smoking cigars. He has never been exposed to tobacco smoke. He has never used smokeless tobacco. He reports current alcohol use. He reports that he does not use drugs.    Family History  'camryn Murry family history includes Hypertension in his mother.    Medications and Allergies     Medications  Outpatient Medications Marked as Taking for the 2/28/25 encounter (Office Visit) with Kimmy Lugo MD   Medication Sig Dispense Refill    cetirizine (ZYRTEC) 10 MG tablet Take  10 mg by mouth once daily.      diclofenac sodium (VOLTAREN) 1 % Gel 3 (three) times daily.      EPINEPHrine (EPIPEN) 0.3 mg/0.3 mL AtIn Inject into the muscle.      fluticasone propionate (FLONASE) 50 mcg/actuation nasal spray 2 sprays by Each Nostril route once daily.      HYDROcodone-acetaminophen (NORCO)  mg per tablet Take 1 tablet by mouth every 6 (six) hours as needed for Pain.      hydrocortisone (ANUSOL-HC) 25 mg suppository 1 suppository.      ibuprofen (ADVIL,MOTRIN) 800 MG tablet Take 800 mg by mouth 3 (three) times daily.      irbesartan (AVAPRO) 300 MG tablet Take 300 mg by mouth once daily.      latanoprost 0.005 % ophthalmic solution Place 1 drop into the left eye every evening.      montelukast (SINGULAIR) 10 mg tablet Take 10 mg by mouth once daily.      mv-mn/FA/K1/resver/lutein/herb (ALIVE ENERGY 50 PLUS ORAL) Take by mouth.      omeprazole (PRILOSEC) 20 MG capsule 1 cap(s) orally once a day      pantoprazole (PROTONIX) 20 MG tablet Take 20 mg by mouth every morning.      pravastatin (PRAVACHOL) 20 MG tablet Take 20 mg by mouth Daily.      tadalafiL (CIALIS) 20 MG Tab 1 tablet as needed Orally Once a day      tiZANidine (ZANAFLEX) 4 MG tablet Take 4 mg by mouth 2 (two) times daily.      [DISCONTINUED] gabapentin (NEURONTIN) 600 MG tablet Take 600 mg by mouth 3 (three) times daily.      [DISCONTINUED] LIDOcaine (XYLOCAINE) 5 % Oint ointment Apply topically 3 (three) times daily as needed (pain). 50 g 2       Allergies  Review of patient's allergies indicates:   Allergen Reactions    Morphine Itching     Other reaction(s): Not Indicated       Physical Examination     Vitals:    02/28/25 1017   BP: 129/81   Pulse: 95     Pain Disability Index (PDI): 37       Spine Musculoskeletal Exam    Gait    Gait is normal.    Inspection    Thoracolumbar        Prior incision: midline lumbar    Incision: clean, dry, intact and well-healed    Incisional drainage: none    Palpation     Thoracolumbar    Tenderness: present      Paraspinous: left    Right      Masses: none      Spasms: none      Crepitus: none      Muscle tone: normal    Left      Masses: none      Spasms: none      Crepitus: none      Muscle tone: normal    Range of Motion    Thoracolumbar        Thoracolumbar range of motion additional comments: Limited range of motion in the lumbar spine due to pain.    Strength    Thoracolumbar    Thoracolumbar motor exam is normal.       General      Constitutional: appears stated age, well-developed and well-nourished    Scleral icterus: no    Labored breathing: no    Psychiatric: normal mood and affect and no acute distress    Neurological: alert and oriented x3    Skin: intact    Lymphadenopathy: none     CV: extremities warm, well-perfused  Resp: nonlabored breathing    Assessment and Plan (including Health Maintenance)      Problem List  Smart Sets  Document Outside HM   :    Plan:   Degeneration of intervertebral disc of lumbar region with discogenic back pain and lower extremity pain  -     LIDOcaine (XYLOCAINE) 5 % Oint ointment; Apply topically 3 (three) times daily as needed (pain).  Dispense: 120 g; Refill: 2    Lumbar stenosis with neurogenic claudication  -     LIDOcaine (XYLOCAINE) 5 % Oint ointment; Apply topically 3 (three) times daily as needed (pain).  Dispense: 120 g; Refill: 2    Chronic back pain greater than 3 months duration  -     LIDOcaine (XYLOCAINE) 5 % Oint ointment; Apply topically 3 (three) times daily as needed (pain).  Dispense: 120 g; Refill: 2    Dorsalgia, unspecified  -     LIDOcaine (XYLOCAINE) 5 % Oint ointment; Apply topically 3 (three) times daily as needed (pain).  Dispense: 120 g; Refill: 2       He is being scheduled for left L4 and L5 transforaminal epidural steroid injections today to help with feeding chronic back pain radiating down the left lower extremity, consistent with findings of degenerative disc disease and nerve impingement seen on his  MRI.  I am also refilling his lidocaine ointment.  He voices understanding and is in agreement with the plan.    Problem List Items Addressed This Visit          Neuro    DDD (degenerative disc disease), lumbar - Primary    Relevant Medications    LIDOcaine (XYLOCAINE) 5 % Oint ointment    Lumbar stenosis with neurogenic claudication    Relevant Medications    LIDOcaine (XYLOCAINE) 5 % Oint ointment       Orthopedic    Chronic back pain greater than 3 months duration    Relevant Medications    LIDOcaine (XYLOCAINE) 5 % Oint ointment     Other Visit Diagnoses         Dorsalgia, unspecified        Relevant Medications    LIDOcaine (XYLOCAINE) 5 % Oint ointment              Future Appointments   Date Time Provider Department Center   3/13/2025  1:00 PM Flora Verduzco FNP [facility]        There are no Patient Instructions on file for this visit.  No follow-ups on file.     Signature:  Kimmy Lugo MD      Date of encounter: 2/28/25

## 2025-03-13 NOTE — PROGRESS NOTES
[facility]   Neurosurgery      Meir Murry  MRN: [id number], CSN: [id number]      : 1965   Age: 59 y.o. male  Payor: MEDICARE / Plan: MEDICARE PART A & B / Product Type: Government /       Ref:  No referring provider defined for this encounter.    PCP: Abdirahman Gaytan Sr., MD    Visit Date: 3/13/2025     Patient Active Problem List   Diagnosis    Gastroesophageal reflux disease without esophagitis    Environmental allergies    Hyperlipidemia    Hypertension    Obesity    DDD (degenerative disc disease), lumbar    Lumbar stenosis with neurogenic claudication    Chronic back pain greater than 3 months duration       SUBJECTIVE:      CC:   No chief complaint on file.      HPI:   Mr. Murry is a 58 y.o. male who has a past medical history significant for hypertension, GERD, osteoarthritis, chronic low back pain on Norco, anxiety, and depression.  He presents as a follow-up patient in the neurosurgery clinic s/p L4-5 laminectomy and extension of fusion on the right from L3 to L5 and on the left L2 to L5 on 2023.  Postoperatively, the patient is delighted to report an improved level of pain as well as functional activity.  Mr. Murry is also s/p a L2-3, L3-4 laminectomy and TLIF ([facility]) on 2020 by Dr. Hickman.      Patient contacted the clinic on 2024 requesting an appointment. He states that it feels like a nerve pain that radiates into his lower back down into his left leg. Describes the pain as an aching pain that has been going on for about a month prior. Rated the pain a 5/10. The pain does occasionally wake him from his sleep. He denies any new surgeries, therapies or tests. He has not been seen by any other provider for this current pain. He has been taking hydrocodone twice daily that is provided to him by his pain management provider Dr. Funez. He was last seen in the neurosurgery clinic on 3/21/2024 with Dr. Tyler. Lumbar spine X-rays were ordered and the patient  "was added to scheduled to be seen in clinic.     Mr. Murry presents to the neurosurgery clinic today with complaints of low back pain that radiates into left lower extremity.  Pain originates in the lower back and radiates down the anterior aspect of the LLE to the dorsum of the left foot. Reports pain started about 3-5 months ago.  Denies any known trauma.  Denies recent falls.  He describes the pain as "nerve pain" that is an intermittent, throbbing ache.  At times this can be a sharp burn.  Denies paraesthesias. Rates his pain level a 6/10 today. Pain is increased with by extended periods of sitting.  Pain is at its worst in the morning when getting out of bed. As he moves throughout the day the pain improves.  He takes Norco twice daily as well as gabapentin 3 times daily for pain as prescribed by Dr. Funez.  Reports moderate improvement with these medications. Denies associated weakness. Despite the pain he remains active daily.  Denies issues with balance.  Denies bowel and bladder dysfunction.    Since his last visit to the neurosurgery clinic, he has established care with Dr. Lugo and is scheduled for L4 and L5 transforaminal VALERIO on 3/26/2025. He has also been participating in physical therapy 2-3 times weekly and reports that this is beneficial to him providing moderate pain relief.          Patient Active Problem List    Diagnosis Date Noted    Chronic back pain greater than 3 months duration 02/06/2025    Lumbar stenosis with neurogenic claudication 09/20/2023    Gastroesophageal reflux disease without esophagitis 01/26/2023    Environmental allergies 01/26/2023    Hyperlipidemia 01/26/2023    Hypertension 01/26/2023    Obesity 01/26/2023    DDD (degenerative disc disease), lumbar      Past Medical History:   Diagnosis Date    Anxiety disorder, unspecified     Aortic regurgitation     Back pain     DDD (degenerative disc disease), lumbar     Depression     Essential (primary) hypertension     GERD " (gastroesophageal reflux disease)     Lumbar stenosis with neurogenic claudication     Male erectile dysfunction, unspecified     Mixed hyperlipidemia     Neuropathy     Obesity, unspecified     WOLFGANG (obstructive sleep apnea)     uses CPAP    Right leg numbness     Right leg weakness     Unspecified osteoarthritis, unspecified site      Past Surgical History:   Procedure Laterality Date    BACK SURGERY      GALLBLADDER SURGERY  2010    POSTERIOR LUMBAR INTERBODY FUSION (PLIF) WITH COMPUTER-ASSISTED NAVIGATION N/A 9/20/2023    Procedure: FUSION, SPINE, LUMBAR, PLIF, USING COMPUTER-ASSISTED NAVIGATION;  Surgeon: Abril Tyler MD;  [facility]: [facility];  Service: Neurosurgery;  Laterality: N/A;  L4-5 laminectomy with extension of L4-5 fusion from L2-3 and L3-4 instrumentation, o-arm  NTI  TIVA setup  [employer]- Colin //  XX    SINUS SURGERY      TOTAL KNEE ARTHROPLASTY Bilateral        Current Outpatient Medications:     cetirizine (ZYRTEC) 10 MG tablet, Take 10 mg by mouth once daily., Disp: , Rfl:     diclofenac sodium (VOLTAREN) 1 % Gel, 3 (three) times daily., Disp: , Rfl:     EPINEPHrine (EPIPEN) 0.3 mg/0.3 mL AtIn, Inject into the muscle., Disp: , Rfl:     fluticasone propionate (FLONASE) 50 mcg/actuation nasal spray, 2 sprays by Each Nostril route once daily., Disp: , Rfl:     gabapentin (NEURONTIN) 800 MG tablet, Take 1 tablet (800 mg total) by mouth 3 (three) times daily., Disp: 90 tablet, Rfl: 5    HYDROcodone-acetaminophen (NORCO)  mg per tablet, Take 1 tablet by mouth every 6 (six) hours as needed for Pain., Disp: , Rfl:     hydrocortisone (ANUSOL-HC) 25 mg suppository, 1 suppository., Disp: , Rfl:     ibuprofen (ADVIL,MOTRIN) 800 MG tablet, Take 800 mg by mouth 3 (three) times daily., Disp: , Rfl:     irbesartan (AVAPRO) 300 MG tablet, Take 300 mg by mouth once daily., Disp: , Rfl:     irbesartan-hydrochlorothiazide (AVALIDE) 300-12.5 mg per tablet, 1 tab(s) orally once a day, Disp: , Rfl:      latanoprost 0.005 % ophthalmic solution, Place 1 drop into the left eye every evening., Disp: , Rfl:     LIDOcaine (XYLOCAINE) 5 % Oint ointment, Apply topically 3 (three) times daily as needed (pain)., Disp: 120 g, Rfl: 2    montelukast (SINGULAIR) 10 mg tablet, Take 10 mg by mouth once daily., Disp: , Rfl:     mv-mn/FA/K1/resver/lutein/herb (ALIVE ENERGY 50 PLUS ORAL), Take by mouth., Disp: , Rfl:     omeprazole (PRILOSEC) 20 MG capsule, 1 cap(s) orally once a day, Disp: , Rfl:     pantoprazole (PROTONIX) 20 MG tablet, Take 20 mg by mouth every morning., Disp: , Rfl:     pravastatin (PRAVACHOL) 20 MG tablet, Take 20 mg by mouth Daily., Disp: , Rfl:     tadalafiL (CIALIS) 20 MG Tab, 1 tablet as needed Orally Once a day, Disp: , Rfl:     tiZANidine (ZANAFLEX) 4 MG tablet, Take 4 mg by mouth 2 (two) times daily., Disp: , Rfl:     Review of patient's allergies indicates:   Allergen Reactions    Morphine Itching     Other reaction(s): Not Indicated       Social History     Tobacco Use    Smoking status: Some Days     Types: Cigars     Passive exposure: Never    Smokeless tobacco: Never   Substance Use Topics    Alcohol use: Yes     Comment: occasionally     Occupation: 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   HENT:  Negative for congestion and sore throat.    Eyes:  Negative for blurred vision and double vision.   Respiratory:  Negative for cough and shortness of breath.    Cardiovascular:  Negative for chest pain and palpitations.   Gastrointestinal:  Negative for constipation, diarrhea, nausea and vomiting.   Musculoskeletal:  Positive for back pain, Negative for neck pain.   Neurological:  Negative for focal weakness, sensory change, and headaches.    Endo/Heme/Allergies:  Does not bruise/bleed easily.   Psychiatric/Behavioral:  Positive for depression and anxiety.      OBJECTIVE:     EXAMINATION:  There were no vitals taken for this visit.  Body Habitus: Normal    Physical Exam:  Constitutional: The patient is well-developed and cooperative, sitting comfortably in a chair    Mental Status:   Oriented to person, place, and time  Normal speech     Motor:  Muscle bulk: normal in all extremities  Tone: normal in all extremities    Upper extremities:   Deltoid: right 5/5; left 5/5  Biceps: right 5/5; left 5/5  Triceps: right 5/5; left 5/5  Wrist extensors: right 5/5; left 5/5  Wrist flexors: right 5/5; left 5/5  : right 5/5; left 5/5  Interosseous muscles: right 5/5; left 5/5     Lower extremities:  Hip flexors: right 5/5; left 5/5  Knee extensors: right 5/5; left 5/5  Knee flexors: right 5/5; left 5/5  Foot dorsiflexors: right 5/5; left 5/5  Foot plantar flexors: right 5/5; left 5/5  Extensor hallucis longus: right 5/5; left 5/5     Sensation:  Normal to light touch in b LE     Reflexes:  Sarkars sign: right negative; left negative  Babinski: right downgoing; left downgoing  Clonus: right negative; left negative     Musculoskeletal:     Gait: normal     Straight leg test: right negative; left negative     Upper back: No pain with palpation  Lower back: Minimal pain with palpation in left lumbosacral region, well healed midline and bilateral parasagittal scars      DIAGNOSTICS REVIEW OF IMAGING, LAB & OTHER STUDIES:  I have personally reviewed and evaluated the following reports as well as radiographic studies:    Lumbar spine x-rays completed 12/10/2024 are stable when compared to imaging completed 03/19/2024. Imaging reveals minimal levoscoliosis at L2-3.  There is minimal retrolisthesis of L1 on L2.  Posterior hardware in place on the left from L2-L5 and on the right from L3-L5.  There are interbody grafts in place at L2-3 and L3-4.  There is no motion on  flexion-extension views.      ASSESSMENT:  Mr. Murry is a 58 y.o. male who has a past medical history significant for hypertension, GERD, osteoarthritis, chronic low back pain on Norco, anxiety, and depression.  He presents as a follow-up patient in the neurosurgery clinic s/p L4-5 laminectomy and extension of fusion on the right from L3 to L5 and on the left L2 to L5  on 09/20/2023.  Postoperatively, the patient is delighted to report an improved level of pain as well as functional activity.  Mr. Murry is also s/p a L2-3, L3-4 laminectomy and TLIF ([facility]) on 03/18/2020 by Dr. Hickman.  He has a normal motor and sensory neurological exam in his bilateral lower extremities.    I reviewed pertinent imaging studies with the patient.  Lumbar spine x-rays completed 12/10/2024 are stable when compared to imaging completed 03/19/2024. Imaging reveals minimal levoscoliosis at L2-3.  There is minimal retrolisthesis of L1 on L2.  Posterior hardware in place on the left from L2-L5 and on the right from L3-L5.  There are interbody grafts in place at L2-3 and L3-4.  There is no motion on flexion-extension views.        PLAN:    Encounter Diagnoses   Name Primary?    Status post lumbar spinal fusion Yes    Lumbar stenosis with neurogenic claudication         Mr. Murry is encouraged to continue with routine follow ups with Dr. Lugo and the pain management clinic. He remains she continue with physical therapy.  Insurance would not pay for his shower chair, patient went out and bought his own.  He was again provided with back exercises and stretches for core conditioning and strengthening. He was encouraged to continue taking pain medications as needed/prescribed. He will continue to keep routine follow ups with his pain management provider Dr. Funez who manages his pain medication. Encouraged to discontinue smoking cigars as this inhibits bony and tissue healing. Patient verbalized understanding. He will follow up in the  neurosurgery clinic in 3 months with repeat lumbar x-rays for surveillance purposes of prior hardware placed.        E/M Level Based On Time:   5 minutes spent on reviewing chart, which includes interpreting lab results and diagnostic tests.   15 minutes spent in the room with the patient performing a history and physical exam, counseling or educating the patient/caregiver, prescribing medications, ordering labwork/diagnostic tests, or placing referrals.   0 minutes spent collaborating plan of care with physician.   10 minutes spent documenting all relevant clinical informationin the electronic health record.     Total Time Spent: 30 minutes     This note will be sent to the patient's referring provider No ref. provider found and primary care provider Abdirahman Gaytan Sr., MD.         Flora Verduzco, NIMOP-C  Neurosurgery  [facility]

## 2025-03-25 ENCOUNTER — OFFICE VISIT (OUTPATIENT)
Dept: NEUROSURGERY | Facility: CLINIC | Age: 60
End: 2025-03-25
Payer: MEDICARE

## 2025-03-25 VITALS
BODY MASS INDEX: 33.97 KG/M2 | WEIGHT: 242.63 LBS | HEART RATE: 92 BPM | SYSTOLIC BLOOD PRESSURE: 145 MMHG | HEIGHT: 71 IN | DIASTOLIC BLOOD PRESSURE: 90 MMHG | RESPIRATION RATE: 16 BRPM

## 2025-03-25 DIAGNOSIS — M48.062 LUMBAR STENOSIS WITH NEUROGENIC CLAUDICATION: ICD-10-CM

## 2025-03-25 DIAGNOSIS — Z98.1 STATUS POST LUMBAR SPINAL FUSION: Primary | ICD-10-CM

## 2025-03-25 PROCEDURE — 99214 OFFICE O/P EST MOD 30 MIN: CPT | Mod: ,,,

## 2025-03-25 RX ORDER — HYDROCODONE BITARTRATE AND ACETAMINOPHEN 10; 300 MG/1; MG/1
1 TABLET ORAL EVERY 8 HOURS PRN
COMMUNITY
Start: 2025-02-25

## 2025-03-26 ENCOUNTER — HOSPITAL ENCOUNTER (OUTPATIENT)
Facility: HOSPITAL | Age: 60
Discharge: HOME | End: 2025-03-26
Attending: ANESTHESIOLOGY | Admitting: ANESTHESIOLOGY
Payer: MEDICARE

## 2025-03-26 VITALS
OXYGEN SATURATION: 80 % | RESPIRATION RATE: 16 BRPM | DIASTOLIC BLOOD PRESSURE: 76 MMHG | WEIGHT: 237 LBS | HEIGHT: 71 IN | TEMPERATURE: 98 F | SYSTOLIC BLOOD PRESSURE: 122 MMHG | HEART RATE: 106 BPM | BODY MASS INDEX: 33.18 KG/M2

## 2025-03-26 DIAGNOSIS — M54.9 CHRONIC BACK PAIN GREATER THAN 3 MONTHS DURATION: Primary | ICD-10-CM

## 2025-03-26 DIAGNOSIS — G89.29 CHRONIC BACK PAIN GREATER THAN 3 MONTHS DURATION: Primary | ICD-10-CM

## 2025-03-26 DIAGNOSIS — M48.062 LUMBAR STENOSIS WITH NEUROGENIC CLAUDICATION: ICD-10-CM

## 2025-03-26 PROCEDURE — 64484 NJX AA&/STRD TFRM EPI L/S EA: CPT | Mod: LT | Performed by: ANESTHESIOLOGY

## 2025-03-26 PROCEDURE — 64483 NJX AA&/STRD TFRM EPI L/S 1: CPT | Mod: LT,,, | Performed by: ANESTHESIOLOGY

## 2025-03-26 PROCEDURE — 64484 NJX AA&/STRD TFRM EPI L/S EA: CPT | Mod: LT,,, | Performed by: ANESTHESIOLOGY

## 2025-03-26 PROCEDURE — 25000003 PHARM REV CODE 250: Performed by: ANESTHESIOLOGY

## 2025-03-26 PROCEDURE — 64483 NJX AA&/STRD TFRM EPI L/S 1: CPT | Mod: LT | Performed by: ANESTHESIOLOGY

## 2025-03-26 PROCEDURE — 63600175 PHARM REV CODE 636 W HCPCS: Performed by: ANESTHESIOLOGY

## 2025-03-26 RX ORDER — LIDOCAINE HYDROCHLORIDE 10 MG/ML
INJECTION, SOLUTION EPIDURAL; INFILTRATION; INTRACAUDAL; PERINEURAL
Status: DISCONTINUED
Start: 2025-03-26 | End: 2025-03-26 | Stop reason: HOSPADM

## 2025-03-26 RX ORDER — DEXAMETHASONE SODIUM PHOSPHATE 10 MG/ML
INJECTION, SOLUTION INTRA-ARTICULAR; INTRALESIONAL; INTRAMUSCULAR; INTRAVENOUS; SOFT TISSUE
Status: DISCONTINUED | OUTPATIENT
Start: 2025-03-26 | End: 2025-03-26 | Stop reason: HOSPADM

## 2025-03-26 RX ORDER — BUPIVACAINE HYDROCHLORIDE 2.5 MG/ML
INJECTION, SOLUTION EPIDURAL; INFILTRATION; INTRACAUDAL; PERINEURAL
Status: DISCONTINUED
Start: 2025-03-26 | End: 2025-03-26 | Stop reason: HOSPADM

## 2025-03-26 RX ORDER — BUPIVACAINE HYDROCHLORIDE 2.5 MG/ML
INJECTION, SOLUTION EPIDURAL; INFILTRATION; INTRACAUDAL; PERINEURAL
Status: DISCONTINUED | OUTPATIENT
Start: 2025-03-26 | End: 2025-03-26 | Stop reason: HOSPADM

## 2025-03-26 RX ORDER — DIAZEPAM 5 MG/1
10 TABLET ORAL
Status: DISCONTINUED | OUTPATIENT
Start: 2025-03-26 | End: 2025-03-26 | Stop reason: HOSPADM

## 2025-03-26 RX ORDER — DEXAMETHASONE SODIUM PHOSPHATE 10 MG/ML
INJECTION, SOLUTION INTRA-ARTICULAR; INTRALESIONAL; INTRAMUSCULAR; INTRAVENOUS; SOFT TISSUE
Status: DISCONTINUED
Start: 2025-03-26 | End: 2025-03-26 | Stop reason: HOSPADM

## 2025-03-26 RX ORDER — LIDOCAINE HYDROCHLORIDE 10 MG/ML
INJECTION, SOLUTION EPIDURAL; INFILTRATION; INTRACAUDAL; PERINEURAL
Status: DISCONTINUED | OUTPATIENT
Start: 2025-03-26 | End: 2025-03-26 | Stop reason: HOSPADM

## 2025-03-26 RX ADMIN — DIAZEPAM 10 MG: 5 TABLET ORAL at 10:03

## 2025-03-26 NOTE — OP NOTE
Procedure:    Left L4  transforaminal epidural steroid injection    Left L5  transforaminal epidural steroid injection    Pre-Procedure Diagnoses:  Chronic back pain greater than 3 months  Lumbar degenerative disc disease  Lumbar radiculopathy  Lumbar disc displacement    Post-Procedure Diagnoses:  Chronic back pain greater than 3 months  Lumbar degenerative disc disease  Lumbar radiculopathy  Lumbar disc displacement    Anesthesia:  Local    Estimated Blood Loss:  < 2 ML    Consent:  The procedure, risks, benefits, and alternatives were discussed with the patient.  The patient voiced understanding and fully informed written consent was obtained.    Description of the Procedure:  The patient was taken to the operating room and placed in the prone position. The skin overlying the lumbar spine was prepped with Chloraprep and draped in the usual sterile fashion.  An oblique fluoroscopic view was obtained on the left side at L4, with the superior articular process of the inferior vertebral body aligned with the pedicle.  Skin anesthesia was achieved using 2 mL of lidocaine 1%.  A 22-gauge 5 -inch Quinke spinal needle was inserted and advanced under intermittent fluoroscopic views into the epidural space. Proper needle position was confirmed under AP, oblique, and lateral fluoroscopic views.  Negative aspiration for blood or CSF was confirmed. 1 mL of contrast was injected, which revealed spread into the epidural space.  Then a combination of 5 mg of dexamethasone with 1 mL of 0.25% bupivacaine was easily injected.   There was no pain on injection. The needle was removed intact and bleeding was nil.  The same procedure was repeated in identical fashion on the left side at L5 .  Sterile bandages were applied. The patient was taken to the recovery room for further observation in stable condition. The patient was then discharged home with no complications.

## 2025-03-26 NOTE — DISCHARGE SUMMARY
[facility] Surgical - Periop Services  Discharge Note  Short Stay    Procedure(s) (LRB):  INJECTION, STEROID, EPIDURAL, TRANSFORAMINAL APPROACH / TFESI Lt L4 & L5 (Left)      OUTCOME: Patient tolerated treatment/procedure well without complication and is now ready for discharge.    DISPOSITION: Home or Self Care    FINAL DIAGNOSIS:  <principal problem not specified>    FOLLOWUP: In clinic    DISCHARGE INSTRUCTIONS:  No discharge procedures on file.     TIME SPENT ON DISCHARGE: 5 minutes

## 2025-04-10 ENCOUNTER — OFFICE VISIT (OUTPATIENT)
Facility: CLINIC | Age: 60
End: 2025-04-10
Payer: MEDICARE

## 2025-04-10 VITALS
HEIGHT: 71 IN | HEART RATE: 100 BPM | WEIGHT: 237 LBS | SYSTOLIC BLOOD PRESSURE: 119 MMHG | BODY MASS INDEX: 33.18 KG/M2 | DIASTOLIC BLOOD PRESSURE: 76 MMHG

## 2025-04-10 DIAGNOSIS — G89.29 CHRONIC BACK PAIN GREATER THAN 3 MONTHS DURATION: ICD-10-CM

## 2025-04-10 DIAGNOSIS — M48.062 LUMBAR STENOSIS WITH NEUROGENIC CLAUDICATION: ICD-10-CM

## 2025-04-10 DIAGNOSIS — M54.9 CHRONIC BACK PAIN GREATER THAN 3 MONTHS DURATION: ICD-10-CM

## 2025-04-10 DIAGNOSIS — M54.9 DORSALGIA, UNSPECIFIED: ICD-10-CM

## 2025-04-10 DIAGNOSIS — M51.362 DEGENERATION OF INTERVERTEBRAL DISC OF LUMBAR REGION WITH DISCOGENIC BACK PAIN AND LOWER EXTREMITY PAIN: Primary | ICD-10-CM

## 2025-04-10 RX ORDER — DICLOFENAC SODIUM 100 MG/1
100 TABLET, EXTENDED RELEASE ORAL DAILY
Qty: 30 TABLET | Refills: 5 | Status: SHIPPED | OUTPATIENT
Start: 2025-04-10

## 2025-04-10 RX ORDER — GABAPENTIN 800 MG/1
800 TABLET ORAL 3 TIMES DAILY
Qty: 90 TABLET | Refills: 5 | Status: SHIPPED | OUTPATIENT
Start: 2025-04-10

## 2025-04-10 NOTE — PROGRESS NOTES
Kimmy Lugo MD        PATIENT NAME: Meir Murry  : 1965  DATE: 4/10/25  MRN: [id number]      Billing Provider: Kimmy Lugo MD  Level of Service:   Patient PCP Information       Provider PCP Type    Abdirahman Gaytan Sr, MD General            Reason for Visit / Chief Complaint: Back Pain (Post-op TFESI Left L4 & L5 3/26/25 pt states he received relief form injection, states pain level has decreased slightly and is able to sleep better at night, pain level 6/10//Pt is requesting refill of gabapentin today. )       Update PCP  Update Chief Complaint         History of Present Illness / Problem Focused Workflow     Meir Murry presents to the clinic with Back Pain (Post-op TFESI Left L4 & L5 3/26/25 pt states he received relief form injection, states pain level has decreased slightly and is able to sleep better at night, pain level 10//Pt is requesting refill of gabapentin today. )     This is a 59-year-old male who presents to clinic today for follow up of low back pain that radiates down the left lower extremity to his foot.  On occasion, he has pain radiating down the right leg too, but his left leg is much worse.  He can not sit in a chair too long and has to get out and walk around after he has been driving for a while because of the pain.  He has difficulty falling asleep, and the pain wakes him up from sleeping.  He underwent lumbar spine surgery with Dr. Tyler in .  He did postoperative physical therapy and is in physical therapy twice per week currently.  He is taking gabapentin 3 times per day and occasionally takes [place].  He underwent left L4 and L5 transforaminal epidural steroid injections a couple of weeks ago.  He reports today that he has been sleeping better and can sit longer since the injection.  He is continuing to walk for exercise.        Back Pain  Associated symptoms include leg pain.   Low-back Pain  Associated symptoms include leg pain.     Review of Systems     Review  of Systems   Respiratory:  Positive for shortness of breath (on exertion).    Musculoskeletal:  Positive for back pain and leg pain.   Psychiatric/Behavioral:  Positive for sleep disturbance.    All other systems reviewed and are negative.       Medical / Social / Family History     Past Medical History:   Diagnosis Date    Anxiety disorder, unspecified     Aortic regurgitation     Back pain     DDD (degenerative disc disease), lumbar     Depression     Essential (primary) hypertension     GERD (gastroesophageal reflux disease)     Lumbar stenosis with neurogenic claudication     Male erectile dysfunction, unspecified     Mixed hyperlipidemia     Neuropathy     Obesity, unspecified     WOLFGANG (obstructive sleep apnea)     uses CPAP    Right leg numbness     Right leg weakness     Unspecified osteoarthritis, unspecified site        Past Surgical History:   Procedure Laterality Date    BACK SURGERY      GALLBLADDER SURGERY  2010    POSTERIOR LUMBAR INTERBODY FUSION (PLIF) WITH COMPUTER-ASSISTED NAVIGATION N/A 9/20/2023    Procedure: FUSION, SPINE, LUMBAR, PLIF, USING COMPUTER-ASSISTED NAVIGATION;  Surgeon: Abril Tyler MD;  Location: [facility];  Service: Neurosurgery;  Laterality: N/A;  L4-5 laminectomy with extension of L4-5 fusion from L2-3 and L3-4 instrumentation, o-arm  NTI  TIVA setup  [employer]- Colin //  XX    SINUS SURGERY      TOTAL KNEE ARTHROPLASTY Bilateral     TRANSFORAMINAL EPIDURAL INJECTION OF STEROID Left 3/26/2025    Procedure: INJECTION, STEROID, EPIDURAL, TRANSFORAMINAL APPROACH / TFESI Lt L4 & L5;  Surgeon: Kimmy Lugo MD;  Location: [facility];  Service: Pain Management;  Laterality: Left;  TFESI Lt L4 & L5       Social History  Mr. Murry  reports that he has been smoking cigars. He has never been exposed to tobacco smoke. He has never used smokeless tobacco. He reports current alcohol use. He reports that he does not use drugs.    Family History  Mr.'s Murry family history includes  Hypertension in his mother.    Medications and Allergies     Medications  Outpatient Medications Marked as Taking for the 4/10/25 encounter (Office Visit) with Kimmy Lugo MD   Medication Sig Dispense Refill    cetirizine (ZYRTEC) 10 MG tablet Take 10 mg by mouth once daily.      diclofenac sodium (VOLTAREN) 1 % Gel 3 (three) times daily.      EPINEPHrine (EPIPEN) 0.3 mg/0.3 mL AtIn Inject into the muscle.      fluticasone propionate (FLONASE) 50 mcg/actuation nasal spray 2 sprays by Each Nostril route once daily.      HYDROcodone-acetaminophen  mg Tab Take 1 tablet by mouth every 8 (eight) hours as needed.      hydrocortisone (ANUSOL-HC) 25 mg suppository 1 suppository.      irbesartan-hydrochlorothiazide (AVALIDE) 300-12.5 mg per tablet 1 tab(s) orally once a day      latanoprost 0.005 % ophthalmic solution Place 1 drop into the left eye every evening.      LIDOcaine (XYLOCAINE) 5 % Oint ointment Apply topically 3 (three) times daily as needed (pain). 120 g 2    montelukast (SINGULAIR) 10 mg tablet Take 10 mg by mouth once daily.      mv-mn/FA/K1/resver/lutein/herb (ALIVE ENERGY 50 PLUS ORAL) Take by mouth.      omeprazole (PRILOSEC) 20 MG capsule 1 cap(s) orally once a day      pantoprazole (PROTONIX) 20 MG tablet Take 20 mg by mouth every morning.      pravastatin (PRAVACHOL) 20 MG tablet Take 20 mg by mouth Daily.      tadalafiL (CIALIS) 20 MG Tab 1 tablet as needed Orally Once a day      tiZANidine (ZANAFLEX) 4 MG tablet Take 4 mg by mouth 2 (two) times daily.      [DISCONTINUED] gabapentin (NEURONTIN) 800 MG tablet Take 1 tablet (800 mg total) by mouth 3 (three) times daily. 90 tablet 5       Allergies  Review of patient's allergies indicates:   Allergen Reactions    Morphine Itching     Other reaction(s): Not Indicated       Physical Examination     Vitals:    04/10/25 0802   BP: 119/76   Pulse: 100     Pain Disability Index (PDI): 49       Spine Musculoskeletal Exam    Gait    Gait is  normal.    Inspection    Thoracolumbar        Prior incision: midline lumbar    Incision: clean, dry, intact and well-healed    Incisional drainage: none    Palpation    Thoracolumbar    Tenderness: present      Paraspinous: left    Right      Masses: none      Spasms: none      Crepitus: none      Muscle tone: normal    Left      Masses: none      Spasms: none      Crepitus: none      Muscle tone: normal    Range of Motion    Thoracolumbar        Thoracolumbar range of motion additional comments: Limited range of motion in the lumbar spine due to pain.    Strength    Thoracolumbar    Thoracolumbar motor exam is normal.       General      Constitutional: appears stated age, well-developed and well-nourished    Scleral icterus: no    Labored breathing: no    Psychiatric: normal mood and affect and no acute distress    Neurological: alert and oriented x3    Skin: intact    Lymphadenopathy: none     CV: extremities warm, well-perfused  Resp: nonlabored breathing    Assessment and Plan (including Health Maintenance)      Problem List  Smart Sets  Document Outside HM   :    Plan:   Degeneration of intervertebral disc of lumbar region with discogenic back pain and lower extremity pain  -     gabapentin (NEURONTIN) 800 MG tablet; Take 1 tablet (800 mg total) by mouth 3 (three) times daily.  Dispense: 90 tablet; Refill: 5  -     diclofenac sodium 100 mg 24 hr tablet; Take 100 mg by mouth once daily.  Dispense: 30 tablet; Refill: 5    Lumbar stenosis with neurogenic claudication  -     gabapentin (NEURONTIN) 800 MG tablet; Take 1 tablet (800 mg total) by mouth 3 (three) times daily.  Dispense: 90 tablet; Refill: 5  -     diclofenac sodium 100 mg 24 hr tablet; Take 100 mg by mouth once daily.  Dispense: 30 tablet; Refill: 5    Chronic back pain greater than 3 months duration  -     gabapentin (NEURONTIN) 800 MG tablet; Take 1 tablet (800 mg total) by mouth 3 (three) times daily.  Dispense: 90 tablet; Refill: 5  -      diclofenac sodium 100 mg 24 hr tablet; Take 100 mg by mouth once daily.  Dispense: 30 tablet; Refill: 5    Dorsalgia, unspecified  -     gabapentin (NEURONTIN) 800 MG tablet; Take 1 tablet (800 mg total) by mouth 3 (three) times daily.  Dispense: 90 tablet; Refill: 5  -     diclofenac sodium 100 mg 24 hr tablet; Take 100 mg by mouth once daily.  Dispense: 30 tablet; Refill: 5       He is doing better since undergoing left L4 and L5 transforaminal epidural steroid injections a couple weeks ago.  I am starting him on extended-release diclofenac for the inflammatory component of his pain.  He will follow up again in 6 months or sooner if needed.    Problem List Items Addressed This Visit          Neuro    DDD (degenerative disc disease), lumbar - Primary    Relevant Medications    gabapentin (NEURONTIN) 800 MG tablet    diclofenac sodium 100 mg 24 hr tablet    Lumbar stenosis with neurogenic claudication    Relevant Medications    gabapentin (NEURONTIN) 800 MG tablet    diclofenac sodium 100 mg 24 hr tablet       Orthopedic    Chronic back pain greater than 3 months duration    Relevant Medications    gabapentin (NEURONTIN) 800 MG tablet    diclofenac sodium 100 mg 24 hr tablet     Other Visit Diagnoses         Dorsalgia, unspecified        Relevant Medications    gabapentin (NEURONTIN) 800 MG tablet    diclofenac sodium 100 mg 24 hr tablet              Future Appointments   Date Time Provider Department Center   10/14/2025  8:00 AM Kimmy Lugo MD [facility]          There are no Patient Instructions on file for this visit.  Follow up in about 6 months (around 10/10/2025).     Signature:  Kimmy Lugo MD      Date of encounter: 4/10/25

## 2025-05-30 DIAGNOSIS — A53.9 SYPHILIS: Primary | ICD-10-CM

## 2025-08-23 ENCOUNTER — PATIENT MESSAGE (OUTPATIENT)
Dept: RESEARCH | Facility: HOSPITAL | Age: 60
End: 2025-08-23
Payer: MEDICARE

## (undated) DEVICE — NDL FLTR 5MCRN BLNT TIP 18GX1

## (undated) DEVICE — ELECTRODE BLADE E-Z CLEAN 4IN

## (undated) DEVICE — Device

## (undated) DEVICE — BUR BONE CUT MICRO TPS 3X3.8MM

## (undated) DEVICE — SPHERE NDI PASSIVE

## (undated) DEVICE — SYR 3ML LL 18GA 1.5IN

## (undated) DEVICE — NDL HYPO REG 25G X 1 1/2

## (undated) DEVICE — DRAPE MEDIUM SHEET 40X70IN

## (undated) DEVICE — DRAPE C-ARMOR EQUIPMENT COVER

## (undated) DEVICE — TRAY CATH FOL SIL URIMTR 16FR

## (undated) DEVICE — SOL NACL IRR 1000ML BTL

## (undated) DEVICE — GLOVE PROTEXIS PI SYN SURG 6.5

## (undated) DEVICE — KIT POS JACKSON TABLE NO HDRST

## (undated) DEVICE — NDL SYR 10ML 18X1.5 LL BLUNT

## (undated) DEVICE — SUT VICRYL 2 0 CT 2

## (undated) DEVICE — SUT VICRYL PLUS 0 CT-1 18IN

## (undated) DEVICE — SPONGE LAP XRAY STERILE 18X18

## (undated) DEVICE — BLADE EZ CLEAN 2 1/2

## (undated) DEVICE — ADAPTER DUAL NSL LUER M-M 7FT

## (undated) DEVICE — SPONGE PATTY NEURO SGL 0.5X6

## (undated) DEVICE — ELECTRODE PATIENT RETURN DISP

## (undated) DEVICE — DRAPE SURG W/TWL 17 5/8X23

## (undated) DEVICE — DRAPE TOP 53X102IN

## (undated) DEVICE — SPONGE SURGIFOAM 100 8.5X12X10

## (undated) DEVICE — KIT SURGIFLO HEMOSTATIC MATRIX

## (undated) DEVICE — CONTRAST ISOVUE M 200 20ML VIL

## (undated) DEVICE — NDL HYPO 22GX1 1/2 SYR 10ML LL

## (undated) DEVICE — CHLORAPREP 10.5 ML APPLICATOR

## (undated) DEVICE — COVER HD BACK TABLE 6FT

## (undated) DEVICE — KIT SURGICAL TURNOVER

## (undated) DEVICE — DISH PETRI MED 3.5IN

## (undated) DEVICE — SET SMARTSITE EXT SMALLBORE NF

## (undated) DEVICE — BENZOIN TINCTURE 0.66ML

## (undated) DEVICE — COVER STERILE-Z BACK TABLE XL

## (undated) DEVICE — TRAY SKIN SCRUB WET PREMIUM

## (undated) DEVICE — DRESSING XEROFORM NONADH 1X8IN

## (undated) DEVICE — GAUZE SPONGE 4X4 12PLY

## (undated) DEVICE — PAD DERMAPROX XL 22X14X.5IN

## (undated) DEVICE — GOWN X-LG STERILE BACK

## (undated) DEVICE — DRAPE ORTH SPLIT 77X108IN

## (undated) DEVICE — STAPLER SKIN PROXIMATE WIDE

## (undated) DEVICE — TAPE CLOTH SOFT MEDIPORE 4IN

## (undated) DEVICE — GLOVE PROTEXIS PI SYN SURG 7

## (undated) DEVICE — DRAPE C-ARM COVER EZ 36X28IN

## (undated) DEVICE — TUBE SUCTION MEDI-VAC STERILE

## (undated) DEVICE — MARKER WRITESITE SKIN CHLRAPRP

## (undated) DEVICE — PROBE BALL TIP 2.3MM

## (undated) DEVICE — GLOVE SIGNATURE MICRO LTX 6.5

## (undated) DEVICE — DRAPE UTILITY W/ TAPE 20X30IN

## (undated) DEVICE — PILLOW HEAD REST

## (undated) DEVICE — KIT DRAIN WOUND RND SPRNG RESV